# Patient Record
Sex: FEMALE | Race: BLACK OR AFRICAN AMERICAN | NOT HISPANIC OR LATINO
[De-identification: names, ages, dates, MRNs, and addresses within clinical notes are randomized per-mention and may not be internally consistent; named-entity substitution may affect disease eponyms.]

---

## 2017-01-20 ENCOUNTER — APPOINTMENT (OUTPATIENT)
Dept: UROLOGY | Facility: CLINIC | Age: 62
End: 2017-01-20

## 2017-01-23 ENCOUNTER — APPOINTMENT (OUTPATIENT)
Dept: UROLOGY | Facility: CLINIC | Age: 62
End: 2017-01-23

## 2017-01-23 ENCOUNTER — APPOINTMENT (OUTPATIENT)
Dept: ULTRASOUND IMAGING | Facility: IMAGING CENTER | Age: 62
End: 2017-01-23

## 2017-02-15 ENCOUNTER — APPOINTMENT (OUTPATIENT)
Dept: BARIATRICS | Facility: CLINIC | Age: 62
End: 2017-02-15

## 2017-02-15 VITALS
WEIGHT: 293 LBS | HEIGHT: 64 IN | BODY MASS INDEX: 50.02 KG/M2 | SYSTOLIC BLOOD PRESSURE: 130 MMHG | DIASTOLIC BLOOD PRESSURE: 92 MMHG

## 2017-03-06 ENCOUNTER — OUTPATIENT (OUTPATIENT)
Dept: OUTPATIENT SERVICES | Facility: HOSPITAL | Age: 62
LOS: 1 days | End: 2017-03-06
Payer: COMMERCIAL

## 2017-03-06 VITALS
RESPIRATION RATE: 18 BRPM | HEIGHT: 61.5 IN | WEIGHT: 293 LBS | HEART RATE: 73 BPM | SYSTOLIC BLOOD PRESSURE: 167 MMHG | OXYGEN SATURATION: 97 % | DIASTOLIC BLOOD PRESSURE: 90 MMHG | TEMPERATURE: 98 F

## 2017-03-06 DIAGNOSIS — Z98.89 OTHER SPECIFIED POSTPROCEDURAL STATES: Chronic | ICD-10-CM

## 2017-03-06 DIAGNOSIS — Z01.818 ENCOUNTER FOR OTHER PREPROCEDURAL EXAMINATION: ICD-10-CM

## 2017-03-06 DIAGNOSIS — E11.9 TYPE 2 DIABETES MELLITUS WITHOUT COMPLICATIONS: ICD-10-CM

## 2017-03-06 DIAGNOSIS — Z98.84 BARIATRIC SURGERY STATUS: ICD-10-CM

## 2017-03-06 DIAGNOSIS — E66.01 MORBID (SEVERE) OBESITY DUE TO EXCESS CALORIES: ICD-10-CM

## 2017-03-06 DIAGNOSIS — Z87.442 PERSONAL HISTORY OF URINARY CALCULI: Chronic | ICD-10-CM

## 2017-03-06 DIAGNOSIS — Z90.710 ACQUIRED ABSENCE OF BOTH CERVIX AND UTERUS: Chronic | ICD-10-CM

## 2017-03-06 LAB
ALBUMIN SERPL ELPH-MCNC: 4 G/DL — SIGNIFICANT CHANGE UP (ref 3.3–5)
ALP SERPL-CCNC: 100 U/L — SIGNIFICANT CHANGE UP (ref 30–120)
ALT FLD-CCNC: 24 U/L DA — SIGNIFICANT CHANGE UP (ref 10–60)
ANION GAP SERPL CALC-SCNC: 10 MMOL/L — SIGNIFICANT CHANGE UP (ref 5–17)
AST SERPL-CCNC: 23 U/L — SIGNIFICANT CHANGE UP (ref 10–40)
BILIRUB SERPL-MCNC: 0.4 MG/DL — SIGNIFICANT CHANGE UP (ref 0.2–1.2)
BLD GP AB SCN SERPL QL: SIGNIFICANT CHANGE UP
BUN SERPL-MCNC: 14 MG/DL — SIGNIFICANT CHANGE UP (ref 7–23)
CALCIUM SERPL-MCNC: 9.8 MG/DL — SIGNIFICANT CHANGE UP (ref 8.4–10.5)
CHLORIDE SERPL-SCNC: 105 MMOL/L — SIGNIFICANT CHANGE UP (ref 96–108)
CO2 SERPL-SCNC: 28 MMOL/L — SIGNIFICANT CHANGE UP (ref 22–31)
CREAT SERPL-MCNC: 0.9 MG/DL — SIGNIFICANT CHANGE UP (ref 0.5–1.3)
GLUCOSE SERPL-MCNC: 100 MG/DL — HIGH (ref 70–99)
HCT VFR BLD CALC: 41.4 % — SIGNIFICANT CHANGE UP (ref 34.5–45)
HGB BLD-MCNC: 12.8 G/DL — SIGNIFICANT CHANGE UP (ref 11.5–15.5)
MCHC RBC-ENTMCNC: 26.5 PG — LOW (ref 27–34)
MCHC RBC-ENTMCNC: 31 GM/DL — LOW (ref 32–36)
MCV RBC AUTO: 85.6 FL — SIGNIFICANT CHANGE UP (ref 80–100)
PLATELET # BLD AUTO: 334 K/UL — SIGNIFICANT CHANGE UP (ref 150–400)
POTASSIUM SERPL-MCNC: 4.3 MMOL/L — SIGNIFICANT CHANGE UP (ref 3.5–5.3)
POTASSIUM SERPL-SCNC: 4.3 MMOL/L — SIGNIFICANT CHANGE UP (ref 3.5–5.3)
PROT SERPL-MCNC: 8.3 G/DL — SIGNIFICANT CHANGE UP (ref 6–8.3)
RBC # BLD: 4.84 M/UL — SIGNIFICANT CHANGE UP (ref 3.8–5.2)
RBC # FLD: 15.4 % — HIGH (ref 10.3–14.5)
SODIUM SERPL-SCNC: 143 MMOL/L — SIGNIFICANT CHANGE UP (ref 135–145)
WBC # BLD: 9.3 K/UL — SIGNIFICANT CHANGE UP (ref 3.8–10.5)
WBC # FLD AUTO: 9.3 K/UL — SIGNIFICANT CHANGE UP (ref 3.8–10.5)

## 2017-03-06 PROCEDURE — 93010 ELECTROCARDIOGRAM REPORT: CPT

## 2017-03-06 PROCEDURE — 86850 RBC ANTIBODY SCREEN: CPT

## 2017-03-06 PROCEDURE — 83036 HEMOGLOBIN GLYCOSYLATED A1C: CPT

## 2017-03-06 PROCEDURE — 36415 COLL VENOUS BLD VENIPUNCTURE: CPT

## 2017-03-06 PROCEDURE — 85027 COMPLETE CBC AUTOMATED: CPT

## 2017-03-06 PROCEDURE — 86901 BLOOD TYPING SEROLOGIC RH(D): CPT

## 2017-03-06 PROCEDURE — 80053 COMPREHEN METABOLIC PANEL: CPT

## 2017-03-06 PROCEDURE — G0463: CPT

## 2017-03-06 PROCEDURE — 86900 BLOOD TYPING SEROLOGIC ABO: CPT

## 2017-03-06 PROCEDURE — 93005 ELECTROCARDIOGRAM TRACING: CPT

## 2017-03-06 NOTE — H&P PST ADULT - FAMILY HISTORY
Father  Still living? No  Family history of diabetes mellitus, Age at diagnosis: Age Unknown     Mother  Still living? No  Family history of heart disease, Age at diagnosis: Age Unknown

## 2017-03-06 NOTE — H&P PST ADULT - HISTORY OF PRESENT ILLNESS
61 y/o morbidly obese female with h/o gout, HTN, cholecystitis, diverticulitis, renal colic, s/p lap band- 2006, cholecystectomy 2011, s/p Percutaneous stone extraction - 2011, overactive bladder, lap band in past, presents to PST for pre surgical evaluation and scheduled for  laparoscopic removal of lap band and sleeve gastrectomy

## 2017-03-06 NOTE — H&P PST ADULT - PSH
History of Cholecystectomy- 2011/April    History of kidney stones  s/p percutaneous stone extractiion 2011  Lap-Band Surgery - 2006    S/P D&C (status post dilation and curettage)  2010 and 2014  S/P hysterectomy  2014

## 2017-03-06 NOTE — H&P PST ADULT - NSANTHOSAYNRD_GEN_A_CORE
No. WHIT screening performed.  STOP BANG Legend: 0-2 = LOW Risk; 3-4 = INTERMEDIATE Risk; 5-8 = HIGH Risk

## 2017-03-06 NOTE — H&P PST ADULT - PMH
Cholecystitis    Diverticulitis    Ex-Cigarette Smoker    GERD (Gastroesophageal Reflux Disease)    Gout    H/O renal calculi    HTN - Hypertension    Morbid Obesity    Osteoarthritis    Overactive Bladder    Pancreatitis Chronic    Renal Colic    Sleep Apnea- mild  per pt. no treatment  Type 2 diabetes mellitus

## 2017-03-07 LAB — HBA1C BLD-MCNC: 5.9 % — HIGH (ref 4–5.6)

## 2017-03-16 ENCOUNTER — APPOINTMENT (OUTPATIENT)
Dept: BARIATRICS | Facility: CLINIC | Age: 62
End: 2017-03-16

## 2017-03-16 VITALS
BODY MASS INDEX: 50.02 KG/M2 | SYSTOLIC BLOOD PRESSURE: 171 MMHG | HEIGHT: 64 IN | WEIGHT: 293 LBS | DIASTOLIC BLOOD PRESSURE: 94 MMHG

## 2017-03-16 RX ORDER — CEFDINIR 300 MG/1
300 CAPSULE ORAL
Qty: 20 | Refills: 0 | Status: DISCONTINUED | COMMUNITY
Start: 2017-01-10 | End: 2017-03-16

## 2017-03-16 RX ORDER — AZELASTINE HYDROCHLORIDE 137 UG/1
0.1 SPRAY, METERED NASAL
Qty: 30 | Refills: 0 | Status: DISCONTINUED | COMMUNITY
Start: 2017-01-04 | End: 2017-03-16

## 2017-03-22 ENCOUNTER — RESULT REVIEW (OUTPATIENT)
Age: 62
End: 2017-03-22

## 2017-03-23 ENCOUNTER — OUTPATIENT (OUTPATIENT)
Dept: OUTPATIENT SERVICES | Facility: HOSPITAL | Age: 62
LOS: 1 days | End: 2017-03-23
Payer: COMMERCIAL

## 2017-03-23 ENCOUNTER — TRANSCRIPTION ENCOUNTER (OUTPATIENT)
Age: 62
End: 2017-03-23

## 2017-03-23 ENCOUNTER — APPOINTMENT (OUTPATIENT)
Dept: BARIATRICS | Facility: HOSPITAL | Age: 62
End: 2017-03-23

## 2017-03-23 DIAGNOSIS — Z98.89 OTHER SPECIFIED POSTPROCEDURAL STATES: Chronic | ICD-10-CM

## 2017-03-23 DIAGNOSIS — Z90.710 ACQUIRED ABSENCE OF BOTH CERVIX AND UTERUS: Chronic | ICD-10-CM

## 2017-03-23 DIAGNOSIS — Z87.442 PERSONAL HISTORY OF URINARY CALCULI: Chronic | ICD-10-CM

## 2017-03-23 PROCEDURE — 88300 SURGICAL PATH GROSS: CPT | Mod: 26,59

## 2017-03-23 PROCEDURE — 88302 TISSUE EXAM BY PATHOLOGIST: CPT | Mod: 26

## 2017-03-23 PROCEDURE — 43774 LAP RMVL GASTR ADJ ALL PARTS: CPT | Mod: AS

## 2017-03-27 DIAGNOSIS — E66.01 MORBID (SEVERE) OBESITY DUE TO EXCESS CALORIES: ICD-10-CM

## 2017-03-27 DIAGNOSIS — E11.9 TYPE 2 DIABETES MELLITUS WITHOUT COMPLICATIONS: ICD-10-CM

## 2017-03-29 ENCOUNTER — CHART COPY (OUTPATIENT)
Age: 62
End: 2017-03-29

## 2017-03-29 PROCEDURE — 88300 SURGICAL PATH GROSS: CPT

## 2017-03-29 PROCEDURE — 43235 EGD DIAGNOSTIC BRUSH WASH: CPT | Mod: XP

## 2017-03-29 PROCEDURE — 88302 TISSUE EXAM BY PATHOLOGIST: CPT

## 2017-03-29 PROCEDURE — 43774 LAP RMVL GASTR ADJ ALL PARTS: CPT

## 2017-03-30 ENCOUNTER — APPOINTMENT (OUTPATIENT)
Dept: BARIATRICS | Facility: CLINIC | Age: 62
End: 2017-03-30

## 2017-03-30 VITALS
HEIGHT: 64 IN | DIASTOLIC BLOOD PRESSURE: 68 MMHG | BODY MASS INDEX: 50.02 KG/M2 | SYSTOLIC BLOOD PRESSURE: 122 MMHG | WEIGHT: 293 LBS

## 2017-03-30 DIAGNOSIS — Z98.84 BARIATRIC SURGERY STATUS: ICD-10-CM

## 2017-04-26 ENCOUNTER — APPOINTMENT (OUTPATIENT)
Dept: BARIATRICS | Facility: CLINIC | Age: 62
End: 2017-04-26

## 2017-04-26 VITALS
BODY MASS INDEX: 50.02 KG/M2 | WEIGHT: 293 LBS | SYSTOLIC BLOOD PRESSURE: 128 MMHG | HEIGHT: 64 IN | DIASTOLIC BLOOD PRESSURE: 86 MMHG

## 2017-04-26 RX ORDER — PREDNISONE 20 MG/1
20 TABLET ORAL
Qty: 10 | Refills: 0 | Status: COMPLETED | COMMUNITY
Start: 2017-04-06

## 2017-04-26 RX ORDER — INDOMETHACIN 25 MG/1
25 CAPSULE ORAL
Qty: 30 | Refills: 0 | Status: COMPLETED | COMMUNITY
Start: 2017-04-10

## 2017-07-12 ENCOUNTER — APPOINTMENT (OUTPATIENT)
Dept: BARIATRICS | Facility: CLINIC | Age: 62
End: 2017-07-12

## 2017-07-12 VITALS
SYSTOLIC BLOOD PRESSURE: 154 MMHG | HEIGHT: 64 IN | DIASTOLIC BLOOD PRESSURE: 97 MMHG | BODY MASS INDEX: 50.02 KG/M2 | WEIGHT: 293 LBS | HEART RATE: 69 BPM

## 2017-07-12 RX ORDER — RAMIPRIL 2.5 MG/1
2.5 CAPSULE ORAL
Qty: 90 | Refills: 0 | Status: COMPLETED | COMMUNITY
Start: 2017-02-14 | End: 2017-07-12

## 2017-07-13 ENCOUNTER — APPOINTMENT (OUTPATIENT)
Dept: UROLOGY | Facility: CLINIC | Age: 62
End: 2017-07-13

## 2017-07-13 RX ORDER — IBUPROFEN 800 MG/1
800 TABLET, FILM COATED ORAL
Qty: 30 | Refills: 0 | Status: DISCONTINUED | COMMUNITY
Start: 2016-10-25 | End: 2017-07-13

## 2017-07-13 RX ORDER — OMEGA-3/DHA/EPA/FISH OIL 910-1400MG
CAPSULE ORAL
Refills: 0 | Status: ACTIVE | COMMUNITY

## 2017-07-13 RX ORDER — BENZONATATE 200 MG/1
200 CAPSULE ORAL
Qty: 20 | Refills: 0 | Status: COMPLETED | COMMUNITY
Start: 2017-06-20 | End: 2017-07-13

## 2017-07-13 RX ORDER — CLARITHROMYCIN 500 MG/1
500 TABLET, FILM COATED ORAL
Qty: 20 | Refills: 0 | Status: COMPLETED | COMMUNITY
Start: 2017-06-13 | End: 2017-07-13

## 2017-07-13 RX ORDER — AMOXICILLIN 875 MG/1
875 TABLET, FILM COATED ORAL
Qty: 20 | Refills: 0 | Status: COMPLETED | COMMUNITY
Start: 2017-03-13 | End: 2017-07-13

## 2017-07-13 RX ORDER — PROMETHAZINE HYDROCHLORIDE AND DEXTROMETHORPHAN HYDROBROMIDE ORAL SOLUTION 15; 6.25 MG/5ML; MG/5ML
6.25-15 SOLUTION ORAL
Qty: 200 | Refills: 0 | Status: COMPLETED | COMMUNITY
Start: 2017-01-10 | End: 2017-07-13

## 2017-07-13 RX ORDER — BACILLUS COAGULANS/INULIN 1B-250 MG
CAPSULE ORAL
Refills: 0 | Status: ACTIVE | COMMUNITY

## 2017-07-13 RX ORDER — RAMIPRIL 2.5 MG/1
2.5 CAPSULE ORAL
Refills: 0 | Status: DISCONTINUED | COMMUNITY
End: 2017-07-13

## 2017-07-13 RX ORDER — FLUTICASONE PROPIONATE 50 UG/1
50 SPRAY, METERED NASAL
Qty: 16 | Refills: 0 | Status: COMPLETED | COMMUNITY
Start: 2017-03-13 | End: 2017-07-13

## 2017-07-28 ENCOUNTER — APPOINTMENT (OUTPATIENT)
Dept: UROLOGY | Facility: CLINIC | Age: 62
End: 2017-07-28
Payer: MEDICARE

## 2017-07-28 ENCOUNTER — OUTPATIENT (OUTPATIENT)
Dept: OUTPATIENT SERVICES | Facility: HOSPITAL | Age: 62
LOS: 1 days | End: 2017-07-28
Payer: COMMERCIAL

## 2017-07-28 DIAGNOSIS — Z98.89 OTHER SPECIFIED POSTPROCEDURAL STATES: Chronic | ICD-10-CM

## 2017-07-28 DIAGNOSIS — Z90.710 ACQUIRED ABSENCE OF BOTH CERVIX AND UTERUS: Chronic | ICD-10-CM

## 2017-07-28 DIAGNOSIS — R35.0 FREQUENCY OF MICTURITION: ICD-10-CM

## 2017-07-28 DIAGNOSIS — Z87.442 PERSONAL HISTORY OF URINARY CALCULI: Chronic | ICD-10-CM

## 2017-07-28 PROCEDURE — 76775 US EXAM ABDO BACK WALL LIM: CPT | Mod: 26

## 2017-07-28 PROCEDURE — 76775 US EXAM ABDO BACK WALL LIM: CPT

## 2017-07-28 PROCEDURE — 99213 OFFICE O/P EST LOW 20 MIN: CPT | Mod: 25

## 2017-08-04 DIAGNOSIS — N20.0 CALCULUS OF KIDNEY: ICD-10-CM

## 2017-09-06 ENCOUNTER — APPOINTMENT (OUTPATIENT)
Dept: BARIATRICS | Facility: CLINIC | Age: 62
End: 2017-09-06
Payer: MEDICARE

## 2017-09-06 VITALS
BODY MASS INDEX: 49.17 KG/M2 | WEIGHT: 288 LBS | SYSTOLIC BLOOD PRESSURE: 139 MMHG | HEART RATE: 69 BPM | DIASTOLIC BLOOD PRESSURE: 87 MMHG | HEIGHT: 64 IN

## 2017-09-06 PROCEDURE — 99214 OFFICE O/P EST MOD 30 MIN: CPT

## 2017-09-13 ENCOUNTER — OUTPATIENT (OUTPATIENT)
Dept: OUTPATIENT SERVICES | Facility: HOSPITAL | Age: 62
LOS: 1 days | End: 2017-09-13
Payer: COMMERCIAL

## 2017-09-13 VITALS
WEIGHT: 285.06 LBS | TEMPERATURE: 99 F | DIASTOLIC BLOOD PRESSURE: 84 MMHG | HEART RATE: 66 BPM | OXYGEN SATURATION: 97 % | SYSTOLIC BLOOD PRESSURE: 127 MMHG | RESPIRATION RATE: 16 BRPM | HEIGHT: 62 IN

## 2017-09-13 DIAGNOSIS — Z01.818 ENCOUNTER FOR OTHER PREPROCEDURAL EXAMINATION: ICD-10-CM

## 2017-09-13 DIAGNOSIS — N32.81 OVERACTIVE BLADDER: ICD-10-CM

## 2017-09-13 DIAGNOSIS — Z87.442 PERSONAL HISTORY OF URINARY CALCULI: Chronic | ICD-10-CM

## 2017-09-13 DIAGNOSIS — E11.9 TYPE 2 DIABETES MELLITUS WITHOUT COMPLICATIONS: ICD-10-CM

## 2017-09-13 DIAGNOSIS — I10 ESSENTIAL (PRIMARY) HYPERTENSION: ICD-10-CM

## 2017-09-13 DIAGNOSIS — Z98.89 OTHER SPECIFIED POSTPROCEDURAL STATES: Chronic | ICD-10-CM

## 2017-09-13 DIAGNOSIS — N32.81 OVERACTIVE BLADDER: Chronic | ICD-10-CM

## 2017-09-13 DIAGNOSIS — Z90.710 ACQUIRED ABSENCE OF BOTH CERVIX AND UTERUS: Chronic | ICD-10-CM

## 2017-09-13 LAB
ANION GAP SERPL CALC-SCNC: 15 MMOL/L — SIGNIFICANT CHANGE UP (ref 5–17)
BUN SERPL-MCNC: 16 MG/DL — SIGNIFICANT CHANGE UP (ref 7–23)
CALCIUM SERPL-MCNC: 10 MG/DL — SIGNIFICANT CHANGE UP (ref 8.4–10.5)
CHLORIDE SERPL-SCNC: 105 MMOL/L — SIGNIFICANT CHANGE UP (ref 96–108)
CO2 SERPL-SCNC: 21 MMOL/L — LOW (ref 22–31)
CREAT SERPL-MCNC: 0.89 MG/DL — SIGNIFICANT CHANGE UP (ref 0.5–1.3)
GLUCOSE SERPL-MCNC: 89 MG/DL — SIGNIFICANT CHANGE UP (ref 70–99)
HBA1C BLD-MCNC: 5.8 % — HIGH (ref 4–5.6)
HCT VFR BLD CALC: 41.1 % — SIGNIFICANT CHANGE UP (ref 34.5–45)
HGB BLD-MCNC: 13.2 G/DL — SIGNIFICANT CHANGE UP (ref 11.5–15.5)
MCHC RBC-ENTMCNC: 28.1 PG — SIGNIFICANT CHANGE UP (ref 27–34)
MCHC RBC-ENTMCNC: 32.1 GM/DL — SIGNIFICANT CHANGE UP (ref 32–36)
MCV RBC AUTO: 87.6 FL — SIGNIFICANT CHANGE UP (ref 80–100)
PLATELET # BLD AUTO: 298 K/UL — SIGNIFICANT CHANGE UP (ref 150–400)
POTASSIUM SERPL-MCNC: 4.3 MMOL/L — SIGNIFICANT CHANGE UP (ref 3.5–5.3)
POTASSIUM SERPL-SCNC: 4.3 MMOL/L — SIGNIFICANT CHANGE UP (ref 3.5–5.3)
RBC # BLD: 4.69 M/UL — SIGNIFICANT CHANGE UP (ref 3.8–5.2)
RBC # FLD: 15.5 % — HIGH (ref 10.3–14.5)
SODIUM SERPL-SCNC: 141 MMOL/L — SIGNIFICANT CHANGE UP (ref 135–145)
WBC # BLD: 8.45 K/UL — SIGNIFICANT CHANGE UP (ref 3.8–10.5)
WBC # FLD AUTO: 8.45 K/UL — SIGNIFICANT CHANGE UP (ref 3.8–10.5)

## 2017-09-13 PROCEDURE — G0463: CPT

## 2017-09-13 PROCEDURE — 83036 HEMOGLOBIN GLYCOSYLATED A1C: CPT

## 2017-09-13 PROCEDURE — 85027 COMPLETE CBC AUTOMATED: CPT

## 2017-09-13 PROCEDURE — 87086 URINE CULTURE/COLONY COUNT: CPT

## 2017-09-13 PROCEDURE — 80048 BASIC METABOLIC PNL TOTAL CA: CPT

## 2017-09-13 RX ORDER — RAMIPRIL 5 MG
0 CAPSULE ORAL
Qty: 0 | Refills: 0 | COMMUNITY

## 2017-09-13 RX ORDER — OMEGA-3 ACID ETHYL ESTERS 1 G
0 CAPSULE ORAL
Qty: 0 | Refills: 0 | COMMUNITY

## 2017-09-13 NOTE — H&P PST ADULT - ANESTHESIA, PREVIOUS REACTION, PROFILE
rash patient doesn't know what type of anesthetic drug,h/o nausea in 2011 and last surgery no problem

## 2017-09-13 NOTE — H&P PST ADULT - PMH
Cholecystitis    Diverticulitis    Ex-Cigarette Smoker    GERD (Gastroesophageal Reflux Disease)    Gout    H/O renal calculi    HTN - Hypertension    Morbid Obesity    Osteoarthritis    Overactive Bladder    Pancreatitis Chronic    Renal Colic    Sleep Apnea- mild  per pt. no treatment Diverticulitis    Essential hypertension    Ex-Cigarette Smoker    GERD (Gastroesophageal Reflux Disease)    Gout    Irritable bowel syndrome, unspecified type    Morbid Obesity  BMI 52  Osteoarthritis    Overactive Bladder    Pancreatitis Chronic    Sleep Apnea- mild  per pt. no treatment  Type 2 diabetes mellitus

## 2017-09-13 NOTE — H&P PST ADULT - HISTORY OF PRESENT ILLNESS
63 y/o morbidly obese female with h/o gout, HTN, cholecystitis, diverticulitis, renal colic,DM2 s/p lap band- 2006& removal in 03/2017, cholecystectomy 2011, s/p Percutaneous stone extraction - 2011, overactive bladder s/p Interstim insertion in 04/2016. Pt c/o stabbing pain to left buttock x 6 months. Had f/ou urology consult- s/p X-ray- revealed lead migration- scheduled for Interstim revision on 09/20/2017

## 2017-09-13 NOTE — H&P PST ADULT - PSH
History of Cholecystectomy- 2011/April    History of kidney stones  s/p percutaneous stone extraction 2011  Lap-Band Surgery - 2006  Removal of Lap band- 03/2017  OAB (overactive bladder)  s/p Interstim insertion-04/2016  S/P D&C (status post dilation and curettage)  2010 and 2014  S/P hysterectomy  2014

## 2017-09-14 LAB
CULTURE RESULTS: SIGNIFICANT CHANGE UP
SPECIMEN SOURCE: SIGNIFICANT CHANGE UP

## 2017-09-27 ENCOUNTER — INPATIENT (INPATIENT)
Facility: HOSPITAL | Age: 62
LOS: 4 days | Discharge: HOME HEALTH | DRG: 392 | End: 2017-10-02
Attending: HOSPITALIST | Admitting: HOSPITALIST
Payer: COMMERCIAL

## 2017-09-27 VITALS
HEART RATE: 81 BPM | OXYGEN SATURATION: 100 % | SYSTOLIC BLOOD PRESSURE: 134 MMHG | TEMPERATURE: 98 F | HEIGHT: 70 IN | WEIGHT: 289.91 LBS | RESPIRATION RATE: 20 BRPM | DIASTOLIC BLOOD PRESSURE: 86 MMHG

## 2017-09-27 DIAGNOSIS — Z87.442 PERSONAL HISTORY OF URINARY CALCULI: Chronic | ICD-10-CM

## 2017-09-27 DIAGNOSIS — Z98.89 OTHER SPECIFIED POSTPROCEDURAL STATES: Chronic | ICD-10-CM

## 2017-09-27 DIAGNOSIS — K57.80 DIVERTICULITIS OF INTESTINE, PART UNSPECIFIED, WITH PERFORATION AND ABSCESS WITHOUT BLEEDING: ICD-10-CM

## 2017-09-27 DIAGNOSIS — Z90.710 ACQUIRED ABSENCE OF BOTH CERVIX AND UTERUS: Chronic | ICD-10-CM

## 2017-09-27 DIAGNOSIS — N32.81 OVERACTIVE BLADDER: Chronic | ICD-10-CM

## 2017-09-27 LAB
ALBUMIN SERPL ELPH-MCNC: 3.8 G/DL — SIGNIFICANT CHANGE UP (ref 3.3–5)
ALP SERPL-CCNC: 90 U/L — SIGNIFICANT CHANGE UP (ref 30–120)
ALT FLD-CCNC: 27 U/L DA — SIGNIFICANT CHANGE UP (ref 10–60)
AMYLASE P1 CFR SERPL: 76 U/L — SIGNIFICANT CHANGE UP (ref 25–125)
ANION GAP SERPL CALC-SCNC: 8 MMOL/L — SIGNIFICANT CHANGE UP (ref 5–17)
APPEARANCE UR: CLEAR — SIGNIFICANT CHANGE UP
AST SERPL-CCNC: 33 U/L — SIGNIFICANT CHANGE UP (ref 10–40)
BILIRUB SERPL-MCNC: 0.6 MG/DL — SIGNIFICANT CHANGE UP (ref 0.2–1.2)
BILIRUB UR-MCNC: NEGATIVE — SIGNIFICANT CHANGE UP
BUN SERPL-MCNC: 7 MG/DL — SIGNIFICANT CHANGE UP (ref 7–23)
CALCIUM SERPL-MCNC: 9.7 MG/DL — SIGNIFICANT CHANGE UP (ref 8.4–10.5)
CHLORIDE SERPL-SCNC: 104 MMOL/L — SIGNIFICANT CHANGE UP (ref 96–108)
CO2 SERPL-SCNC: 26 MMOL/L — SIGNIFICANT CHANGE UP (ref 22–31)
COLOR SPEC: YELLOW — SIGNIFICANT CHANGE UP
CREAT SERPL-MCNC: 0.83 MG/DL — SIGNIFICANT CHANGE UP (ref 0.5–1.3)
DIFF PNL FLD: NEGATIVE — SIGNIFICANT CHANGE UP
EOSINOPHIL NFR BLD AUTO: 2 % — SIGNIFICANT CHANGE UP (ref 0–6)
GLUCOSE SERPL-MCNC: 96 MG/DL — SIGNIFICANT CHANGE UP (ref 70–99)
GLUCOSE UR QL: NEGATIVE MG/DL — SIGNIFICANT CHANGE UP
HCT VFR BLD CALC: 47.1 % — HIGH (ref 34.5–45)
HGB BLD-MCNC: 14.8 G/DL — SIGNIFICANT CHANGE UP (ref 11.5–15.5)
KETONES UR-MCNC: NEGATIVE — SIGNIFICANT CHANGE UP
LEUKOCYTE ESTERASE UR-ACNC: NEGATIVE — SIGNIFICANT CHANGE UP
LIDOCAIN IGE QN: 121 U/L — SIGNIFICANT CHANGE UP (ref 73–393)
LYMPHOCYTES # BLD AUTO: 15 % — SIGNIFICANT CHANGE UP (ref 13–44)
MCHC RBC-ENTMCNC: 28.1 PG — SIGNIFICANT CHANGE UP (ref 27–34)
MCHC RBC-ENTMCNC: 31.4 GM/DL — LOW (ref 32–36)
MCV RBC AUTO: 89.7 FL — SIGNIFICANT CHANGE UP (ref 80–100)
MONOCYTES NFR BLD AUTO: 3 % — SIGNIFICANT CHANGE UP (ref 2–14)
NEUTROPHILS NFR BLD AUTO: 80 % — HIGH (ref 43–77)
NITRITE UR-MCNC: NEGATIVE — SIGNIFICANT CHANGE UP
PH UR: 6 — SIGNIFICANT CHANGE UP (ref 5–8)
PLATELET # BLD AUTO: 205 K/UL — SIGNIFICANT CHANGE UP (ref 150–400)
POTASSIUM SERPL-MCNC: 4.5 MMOL/L — SIGNIFICANT CHANGE UP (ref 3.5–5.3)
POTASSIUM SERPL-SCNC: 4.5 MMOL/L — SIGNIFICANT CHANGE UP (ref 3.5–5.3)
PROT SERPL-MCNC: 8.7 G/DL — HIGH (ref 6–8.3)
PROT UR-MCNC: NEGATIVE MG/DL — SIGNIFICANT CHANGE UP
RBC # BLD: 5.25 M/UL — HIGH (ref 3.8–5.2)
RBC # FLD: 14.8 % — HIGH (ref 10.3–14.5)
SODIUM SERPL-SCNC: 138 MMOL/L — SIGNIFICANT CHANGE UP (ref 135–145)
SP GR SPEC: 1.01 — SIGNIFICANT CHANGE UP (ref 1.01–1.02)
UROBILINOGEN FLD QL: NEGATIVE MG/DL — SIGNIFICANT CHANGE UP
WBC # BLD: 5 K/UL — SIGNIFICANT CHANGE UP (ref 3.8–10.5)
WBC # FLD AUTO: 5 K/UL — SIGNIFICANT CHANGE UP (ref 3.8–10.5)

## 2017-09-27 PROCEDURE — 99223 1ST HOSP IP/OBS HIGH 75: CPT | Mod: AI

## 2017-09-27 PROCEDURE — 74177 CT ABD & PELVIS W/CONTRAST: CPT | Mod: 26

## 2017-09-27 PROCEDURE — 99285 EMERGENCY DEPT VISIT HI MDM: CPT

## 2017-09-27 RX ORDER — SODIUM CHLORIDE 9 MG/ML
1000 INJECTION, SOLUTION INTRAVENOUS
Qty: 0 | Refills: 0 | Status: DISCONTINUED | OUTPATIENT
Start: 2017-09-27 | End: 2017-10-02

## 2017-09-27 RX ORDER — SODIUM CHLORIDE 9 MG/ML
1000 INJECTION INTRAMUSCULAR; INTRAVENOUS; SUBCUTANEOUS ONCE
Qty: 0 | Refills: 0 | Status: COMPLETED | OUTPATIENT
Start: 2017-09-27 | End: 2017-09-27

## 2017-09-27 RX ORDER — MORPHINE SULFATE 50 MG/1
2 CAPSULE, EXTENDED RELEASE ORAL ONCE
Qty: 0 | Refills: 0 | Status: DISCONTINUED | OUTPATIENT
Start: 2017-09-27 | End: 2017-09-27

## 2017-09-27 RX ORDER — ALLOPURINOL 300 MG
300 TABLET ORAL DAILY
Qty: 0 | Refills: 0 | Status: DISCONTINUED | OUTPATIENT
Start: 2017-09-27 | End: 2017-10-02

## 2017-09-27 RX ORDER — DEXTROSE 50 % IN WATER 50 %
1 SYRINGE (ML) INTRAVENOUS ONCE
Qty: 0 | Refills: 0 | Status: DISCONTINUED | OUTPATIENT
Start: 2017-09-27 | End: 2017-10-02

## 2017-09-27 RX ORDER — METRONIDAZOLE 500 MG
500 TABLET ORAL ONCE
Qty: 0 | Refills: 0 | Status: COMPLETED | OUTPATIENT
Start: 2017-09-27 | End: 2017-09-27

## 2017-09-27 RX ORDER — CIPROFLOXACIN LACTATE 400MG/40ML
400 VIAL (ML) INTRAVENOUS EVERY 12 HOURS
Qty: 0 | Refills: 0 | Status: DISCONTINUED | OUTPATIENT
Start: 2017-09-27 | End: 2017-10-01

## 2017-09-27 RX ORDER — SODIUM CHLORIDE 9 MG/ML
1000 INJECTION, SOLUTION INTRAVENOUS
Qty: 0 | Refills: 0 | Status: COMPLETED | OUTPATIENT
Start: 2017-09-27 | End: 2017-09-27

## 2017-09-27 RX ORDER — INSULIN LISPRO 100/ML
VIAL (ML) SUBCUTANEOUS
Qty: 0 | Refills: 0 | Status: DISCONTINUED | OUTPATIENT
Start: 2017-09-27 | End: 2017-09-29

## 2017-09-27 RX ORDER — SODIUM CHLORIDE 9 MG/ML
1000 INJECTION INTRAMUSCULAR; INTRAVENOUS; SUBCUTANEOUS
Qty: 0 | Refills: 0 | Status: DISCONTINUED | OUTPATIENT
Start: 2017-09-27 | End: 2017-09-28

## 2017-09-27 RX ORDER — COLCHICINE 0.6 MG
0.6 TABLET ORAL DAILY
Qty: 0 | Refills: 0 | Status: DISCONTINUED | OUTPATIENT
Start: 2017-09-27 | End: 2017-09-29

## 2017-09-27 RX ORDER — DEXTROSE 50 % IN WATER 50 %
25 SYRINGE (ML) INTRAVENOUS ONCE
Qty: 0 | Refills: 0 | Status: DISCONTINUED | OUTPATIENT
Start: 2017-09-27 | End: 2017-10-02

## 2017-09-27 RX ORDER — PANTOPRAZOLE SODIUM 20 MG/1
40 TABLET, DELAYED RELEASE ORAL
Qty: 0 | Refills: 0 | Status: DISCONTINUED | OUTPATIENT
Start: 2017-09-27 | End: 2017-10-02

## 2017-09-27 RX ORDER — ALBUTEROL 90 UG/1
2 AEROSOL, METERED ORAL EVERY 6 HOURS
Qty: 0 | Refills: 0 | Status: DISCONTINUED | OUTPATIENT
Start: 2017-09-27 | End: 2017-10-02

## 2017-09-27 RX ORDER — INFLUENZA VIRUS VACCINE 15; 15; 15; 15 UG/.5ML; UG/.5ML; UG/.5ML; UG/.5ML
0.5 SUSPENSION INTRAMUSCULAR ONCE
Qty: 0 | Refills: 0 | Status: COMPLETED | OUTPATIENT
Start: 2017-09-27 | End: 2017-09-27

## 2017-09-27 RX ORDER — METRONIDAZOLE 500 MG
500 TABLET ORAL EVERY 8 HOURS
Qty: 0 | Refills: 0 | Status: DISCONTINUED | OUTPATIENT
Start: 2017-09-27 | End: 2017-10-01

## 2017-09-27 RX ORDER — ATENOLOL 25 MG/1
100 TABLET ORAL DAILY
Qty: 0 | Refills: 0 | Status: DISCONTINUED | OUTPATIENT
Start: 2017-09-27 | End: 2017-10-02

## 2017-09-27 RX ORDER — CIPROFLOXACIN LACTATE 400MG/40ML
400 VIAL (ML) INTRAVENOUS ONCE
Qty: 0 | Refills: 0 | Status: COMPLETED | OUTPATIENT
Start: 2017-09-27 | End: 2017-09-27

## 2017-09-27 RX ORDER — GLUCAGON INJECTION, SOLUTION 0.5 MG/.1ML
1 INJECTION, SOLUTION SUBCUTANEOUS ONCE
Qty: 0 | Refills: 0 | Status: DISCONTINUED | OUTPATIENT
Start: 2017-09-27 | End: 2017-10-02

## 2017-09-27 RX ORDER — DEXTROSE 50 % IN WATER 50 %
12.5 SYRINGE (ML) INTRAVENOUS ONCE
Qty: 0 | Refills: 0 | Status: DISCONTINUED | OUTPATIENT
Start: 2017-09-27 | End: 2017-10-02

## 2017-09-27 RX ORDER — IOHEXOL 300 MG/ML
30 INJECTION, SOLUTION INTRAVENOUS ONCE
Qty: 0 | Refills: 0 | Status: COMPLETED | OUTPATIENT
Start: 2017-09-27 | End: 2017-09-27

## 2017-09-27 RX ORDER — GABAPENTIN 400 MG/1
300 CAPSULE ORAL
Qty: 0 | Refills: 0 | Status: DISCONTINUED | OUTPATIENT
Start: 2017-09-27 | End: 2017-10-02

## 2017-09-27 RX ORDER — AMLODIPINE BESYLATE 2.5 MG/1
5 TABLET ORAL DAILY
Qty: 0 | Refills: 0 | Status: DISCONTINUED | OUTPATIENT
Start: 2017-09-27 | End: 2017-10-02

## 2017-09-27 RX ORDER — ONDANSETRON 8 MG/1
4 TABLET, FILM COATED ORAL ONCE
Qty: 0 | Refills: 0 | Status: COMPLETED | OUTPATIENT
Start: 2017-09-27 | End: 2017-09-27

## 2017-09-27 RX ADMIN — Medication 100 MILLIGRAM(S): at 16:45

## 2017-09-27 RX ADMIN — Medication 200 MILLIGRAM(S): at 16:45

## 2017-09-27 RX ADMIN — SODIUM CHLORIDE 1000 MILLILITER(S): 9 INJECTION INTRAMUSCULAR; INTRAVENOUS; SUBCUTANEOUS at 16:44

## 2017-09-27 RX ADMIN — ONDANSETRON 4 MILLIGRAM(S): 8 TABLET, FILM COATED ORAL at 13:18

## 2017-09-27 RX ADMIN — MORPHINE SULFATE 2 MILLIGRAM(S): 50 CAPSULE, EXTENDED RELEASE ORAL at 21:07

## 2017-09-27 RX ADMIN — SODIUM CHLORIDE 50 MILLILITER(S): 9 INJECTION, SOLUTION INTRAVENOUS at 23:14

## 2017-09-27 RX ADMIN — MORPHINE SULFATE 2 MILLIGRAM(S): 50 CAPSULE, EXTENDED RELEASE ORAL at 21:41

## 2017-09-27 RX ADMIN — Medication 100 MILLIGRAM(S): at 22:14

## 2017-09-27 RX ADMIN — IOHEXOL 30 MILLILITER(S): 300 INJECTION, SOLUTION INTRAVENOUS at 13:16

## 2017-09-27 NOTE — ED ADULT NURSE NOTE - OBJECTIVE STATEMENT
62 year old female presents to ED with chief complaint of frequent episodes of diarrhea since Friday.  Pt states that whatever she eats or drinks it goes right through her and results in diarrhea.   Pt denies pain or discomfort.  She believes she is dehydrated . Pt appears in no acute distress . skin warm and dry  vital signs within normal limit

## 2017-09-27 NOTE — H&P ADULT - HISTORY OF PRESENT ILLNESS
62f morbid obesity, gout, GERD, diabetes with neuropathy, HTN p/w diarrhea for 5 days found to have sigmoid diverticulitis on CT abdomen done in ER.  Given cipro/flagyl/IVF in ER.  Does not feel well to go home and take oral antibiotics.  Some diffuse abdominal soreness on left side, no pain.  Unable to keep food in her GI system for long; 'goes right through'.  feels weak.  no fever or chills.  no prior h/o constipation or diverticulitis.  h/o gastric lap band in 2007.

## 2017-09-27 NOTE — H&P ADULT - ASSESSMENT
62 female with    1. sigmoid diverticulitis/diarrhea: cipro/flagyl/IVF. quantify diarrhea. Notified patient's GI Dr. Laws about admission. No recent antibiotic use reported.  full liquid diet.    2. type 2 diabetes with neuropathy: SSI coverage.    3. HTN: Blood pressure meds with hold parameters     4. morbid obesity/gastric lap band history: no h/o WHIT.    5. GERD: ppi    6. gout: home meds    7. lovenox for dvt ppx    8. dispo: home in 2-3 days.  plan of care d/w daughter at bedside.

## 2017-09-27 NOTE — ED PROVIDER NOTE - PROGRESS NOTE DETAILS
Occupational Therapy Daily Treatment      Visit Count: 13   Initial Evaluation Date: 3/27/2017  Referred by: MARYA ShanksC  Medical Diagnosis (from order): Z09 Follow-up examination following surgery   MD Follow Up:  5/23/17  Primary Insurance: [employer]  Secondary Insurance: N/A  Requirements:      [employer]/GROUP MEDICARE ADV [id number]  ID#: [id number]  Eff Date: 1/1/17 (calendar year policy)      Visit limit: None, based on medical necessity  Pre auth/cert required: Yes  - When its required: After 30th visit  - How to obtain: Auth Specialist to submit auth request online.      Copay: $25  Pays at 100%  OOP: $2400 - $316.37 met  Secondary Insurance? NONE     Date of Surgery: 2/18/17 and 3/14/17; surgery performed: ORIF L olecranon fracture, 30 degree tricep advancement; rehabilitation guidelines: yes  Diagnosis Precautions: PROM only, splint on at all times other than motion  Relevant co-morbidities and medications: none  Relevant Tests: Relevant diagnostic tests: plain film radiograph      Medical/surgical history, medications and relevant tests have been reviewed.    SUBJECTIVE   Pt states MD appointment went well, will be referred for EMG test.    Current Pain: 1/10  Continued tingling in hand in ulnar distribution, scar at elbow is sensitive; occasional sharp pain through the RF/SF into the volar wrist  Functional Change: none noted by pt    OBJECTIVE   Posture/Observation:  -pt has inability to move the left arm in normal patterns at this time  -has numbness and tingling in the left RF and SF and into the palm distal to wrist  -increased edema in the hand and FA  -shiny appearance to the skin and palm is sweaty  - rash-like appearance to volar hand  - warmer temperature of affected hand vs. Non-affected  -able to make an active loose fist non-painful; \"pulling a lot\" with passive motion  -no noted nail growth or hair growth changes     Range of  Motion (degrees): Digit Active Range of Motion  [] All motions within functional/normal limits except those noted.   [x] Only those motions that were assessed are noted.   [] Uninvolved motion within functional/normal limits.   Left Right Involved Left    Date Initial Initial  5/22/17    Index Finger       MCP  Ext/flex 0/66 0/92  0/76   PIP  Ext/Flex 0/58 0/94  0/90   DIP  Ext/Flex 0/32 0/70  0/64   DPC distance        Middle Finger       MCP  Ext/Flex 0/50 0/90  0/72   PIP Ext/Flex 0/52 0/100  0/88   DIP  Ext/Flex 0/22 0/70  0/54   DPC distance        Ring Finger       MCP  Ext/Flex 0/20 0/90  0/80   PIP  Ext/Flex 0/54 0/92  0/90   DIP  Ext/Flex 0/36 0/74  0/62   DPC distance        Small Finger       MCP  Ext/Flex 0/14 0/90  0/76   PIP  Ext/Flex 0/70 0/92  0/90   DIP Ext/Flex 0/50 0/72  0/74   DPC distance        Thumb       MCP Ext/Flex 0/56 0/64  0/56   IP Ext/Flex 0/42 0/88  0/58   Radial ABDuction 0/45 0/55  0/55   Palmar ABDuction 0/49 0/55  0/57   Tip Opposition full full  full   Opposition to   Base of 5th digit 4cm from full full  full   [standard testing positions unless otherwise noted; Ext=extension, Flex=flexion, SF=small finger, MCP=metacarpophalangeal joint, PIP=proximal interphalangeal joint, DIP=distal interphalangeal joints, DPC=distal palmar crease, IP=interphalangeal joint]  Comments: *denotes pain    Range of Motion (degrees): Active Range of Motion  [] All motions within functional/normal limits except those noted.   [x] Only those motions that were assessed are noted.   [] Uninvolved motion within functional/normal limits.   Norm Left Right Involved   Date  Initial Initial    Wrist Flexion 80°  0/20 0/65 0/60   Wrist Extension 70° 0/40 0/70 0/57   Radial Deviation 20° 0/20 0/20 0/20   Ulnar Deviation 45° 0/25 0/40 0/20   Forearm Supination 90° 0/40 0/90 0/70   Forearm Pronation 90° 0/90 0/90 0/90   [standard testing positions unless otherwise noted]  Comments: denotes pain     Elbow  Circumference: (cm)    Left  3/27/17 Right  3/27/17 Left  4/27/17 Left  5/22/17   Joint line  26.5 23.9 24.4 23.2   Comments:     Wrist/Hand Circumferential: (cm)   Left Right Left 5/22/17   Date: 4/27/17      Proximal palmar crease 19 18.5 18.4   Metacarpophalangeal Row 19.5 18.4 19.2   Wrist at Styloids 17 14.8 16.2     Outcome Measures:   Disabilities of the Arm, Shoulder and Hand (DASH): 90 (scored 0-100; a higher score indicates greater disability)     Range of Motion (degrees) Norm  Norm Left  Left Right   Elbow         Date  Initial Initial   Flexion 140-150  80°  72 WNL   Extension 0-10  70°  20 WNL   standard testing positions unless otherwise noted; Key: ranges are reported in active range of motion unless noted as AA=active assistive or P=passive range of motion, * denotes pain   Comments:        Treatment   Therapeutic Exercises:  AROM of elbow, fingers, wrist    Manual Therapy  Grade 2 joint mobs elbow    Modalities:  Patient has been made aware of potential contraindications and possible risks associated with the use of the following modalities and has agreed.  Moist Heat (97579):  Location: L elbow; Position: sitting; Duration: 10 minutes Temperature: 160° F   Modality treatment resulted in decreased pain and improved range of motion.  Patient reports no adverse reaction to treatment.    Current Home Program (not performed this date except as noted above):   PROM of elbow  Wrist and finger motion  Hand edema exercises  Fitted pt with elastomer for left elbow scar 5/17/17     ASSESSMENT   Pt tolerated session okay, pain with joint mobilizations but subsides shortly after. Pt was fitted with static progressive splint by [employer] representative. Pt educated on wear and care of splint.    Pain after treatment: 2/10.    Compliant with therapy visits and home exercise program: Yes  Progress toward discharge/long term goals: good progress    Patient would continue to benefit from skilled therapy to  increase strength/stability, increase range of motion, decrease pain, increase scar tissue mobility to address remaining functional limitation(s) in ADLs and IADLs.     Result of above outlined education: Verbalizes understanding and Demonstrates understanding        Goals:          To be obtained by end of this plan of care:  Continue progressing toward goals.  1. Patient independent with modified and progressed home exercise program. (MET)  2. Patient will decrease involved elbow pain to 1/10 to aid in normalization of upper extremity movements to aid activities of independent daily living, tolerating repetitive overhead/upper extremity activity. (Progressing)  3. Patient will increase involved elbow active range of motion to WFL ° to aid in normalization of upper extremity movements to aid activities of independent daily living. (Progressing)   4. Patient will increase involved elbow strength to within functional limits to aid in normalization of upper extremity movements to aid activities of independent daily living. (Progressing)  5. Patient will be able to sleep 6 hours without disruption from pain. (Progressing)  6. Patient will increase involved wrist active range of motion to 50° for flexion and extension to aid in completion of self care tasks. (MET)  7. DASH: Patient will complete form to reflect an improved score from initial score of 90 to less than or equal to 45 (scored 0-100; a higher score indicates greater disability) to indicate pt reported improvement in function/disability/impairment (minimal detectable change: 15 points).    PLAN   Modalities for pain and increase ROM; AA/PROM of wrist and FA and fingers; scar management  Kinesiotaping for hand/wrist/forearm edema  PROGRESS NOTE NEXT VISIT  THERAPY DAILY BILLING   Primary Insurance: [employer]  Secondary Insurance: N/A    Evaluation Procedures:  No evaluation codes were used on this date of service    Timed  Procedures:  Manual Therapy, 15 minutes  Therapeutic Exercise, 15 minutes    Untimed Procedures:  Hot/Cold Pack Therapy    Total Treatment Time: 40 minutes    Physician Signature on file.    spoke with Dr Madison, case discussed, will admit patient

## 2017-09-27 NOTE — ED ADULT NURSE REASSESSMENT NOTE - NS ED NURSE REASSESS COMMENT FT1
telephone report given to IVANIA Hightower 1E. pt reported some relief of pain from previous PRN dose of morphine. pt transported to 1E

## 2017-09-27 NOTE — H&P ADULT - NSHPLABSRESULTS_GEN_ALL_CORE
Hematocrit: 47.1 % <H> ( @ 12:48)  Hemoglobin: 14.8 g/dL ( @ 12:48)  RBC Count: 5.25 M/uL <H> ( @ 12:48)  Platelet Count - Automated: 205 K/uL ( @ 12:48)  WBC Count: 5.0 K/uL ( @ 12:48)          138  |  104  |  7   ----------------------------<  96  4.5   |  26  |  0.83    Ca    9.7      27 Sep 2017 12:48    TPro  8.7<H>  /  Alb  3.8  /  TBili  0.6  /  DBili  x   /  AST  33  /  ALT  27  /  AlkPhos  90                      Urinalysis Basic - ( 27 Sep 2017 14:27 )    Color: Yellow / Appearance: Clear / S.010 / pH: x  Gluc: x / Ketone: Negative  / Bili: Negative / Urobili: Negative mg/dL   Blood: x / Protein: Negative mg/dL / Nitrite: Negative   Leuk Esterase: Negative / RBC: x / WBC x   Sq Epi: x / Non Sq Epi: x / Bacteria: x        Bilirubin Total, Serum: 0.6 mg/dL ( @ 12:48)  Alanine Aminotransferase (ALT/SGPT): 27 U/L DA ( @ 12:48)  Albumin, Serum: 3.8 g/dL ( @ 12:48)  Alkaline Phosphatase, Serum: 90 U/L ( @ 12:48)  Aspartate Aminotransferase (AST/SGOT): 33 U/L ( @ 12:48)            CT abdomen: sigmoid diverticulitis

## 2017-09-27 NOTE — H&P ADULT - FAMILY HISTORY
Mother  Still living? Unknown  Family history of diabetes mellitus, Age at diagnosis: Age Unknown     Father  Still living? Unknown  Family history of heart disease, Age at diagnosis: Age Unknown

## 2017-09-27 NOTE — ED PROVIDER NOTE - OBJECTIVE STATEMENT
61 yo female presents with diarrhea,  left lower quadrant cramping, mild nausea x 5 days, denies fever.  denies vomiting, states has hx of diverticulitis.  no recent antibiotic use.  states she called her GI Dr Laws yesterday, he prescribed a medication for the diarrhea, but it did not help.  hx of ibs.  PMD Dr Sobeida Hernandez, Surgeon Dr Valdes  patient had her lap band removed many years ago

## 2017-09-27 NOTE — PROVIDER CONTACT NOTE (OTHER) - SITUATION
Patient had a blood glucose of 59. Orange Juice Given.  called. IV fluids changed. Rechecked 15 minutes later and it was 82. Doctor aware. Continue to monitor

## 2017-09-27 NOTE — H&P ADULT - NSHPPHYSICALEXAM_GEN_ALL_CORE
PHYSICAL EXAM:  GENERAL: No apparent distress, well-groomed, well-developed  HEAD:  Atraumatic, Normocephalic  EYES: conjunctiva and sclera clear  ENMT: Moist mucous membranes  NECK: Supple, No Jugular venous distension  NERVOUS SYSTEM:  Alert & Oriented X3, Good concentration; Bilateral lower extremity mobile, sensation to light touch intact  CHEST/LUNG: Clear to auscultation bilaterally; No rales, rhonchi, wheezing, or rubs  HEART: Regular rate and rhythm; No murmurs, rubs, or gallops  ABDOMEN: Soft, Nontender, Nondistended; Bowel sounds present, obese, no focal ttp  EXTREMITIES:  2+ Peripheral Pulses, No clubbing or cyanosis  LYMPH: No lymphadenopathy noted  SKIN: No rashes or lesions

## 2017-09-27 NOTE — ED ADULT NURSE NOTE - PMH
Diverticulitis    Essential hypertension    Ex-Cigarette Smoker    GERD (Gastroesophageal Reflux Disease)    Gout    Irritable bowel syndrome, unspecified type    Morbid Obesity  BMI 52  Osteoarthritis    Overactive Bladder    Pancreatitis Chronic    Sleep Apnea- mild  per pt. no treatment  Type 2 diabetes mellitus

## 2017-09-27 NOTE — ED PROVIDER NOTE - CARE PLAN
Principal Discharge DX:	Abdominal pain  Secondary Diagnosis:	Diarrhea, unspecified type Principal Discharge DX:	Diverticulitis  Secondary Diagnosis:	Diarrhea, unspecified type

## 2017-09-28 LAB
ALBUMIN SERPL ELPH-MCNC: 3.3 G/DL — SIGNIFICANT CHANGE UP (ref 3.3–5)
ALP SERPL-CCNC: 74 U/L — SIGNIFICANT CHANGE UP (ref 30–120)
ALT FLD-CCNC: 24 U/L DA — SIGNIFICANT CHANGE UP (ref 10–60)
ANION GAP SERPL CALC-SCNC: 8 MMOL/L — SIGNIFICANT CHANGE UP (ref 5–17)
AST SERPL-CCNC: 23 U/L — SIGNIFICANT CHANGE UP (ref 10–40)
BILIRUB SERPL-MCNC: 0.5 MG/DL — SIGNIFICANT CHANGE UP (ref 0.2–1.2)
BUN SERPL-MCNC: 5 MG/DL — LOW (ref 7–23)
C DIFF BY PCR RESULT: SIGNIFICANT CHANGE UP
C DIFF TOX GENS STL QL NAA+PROBE: SIGNIFICANT CHANGE UP
CALCIUM SERPL-MCNC: 9.2 MG/DL — SIGNIFICANT CHANGE UP (ref 8.4–10.5)
CHLORIDE SERPL-SCNC: 106 MMOL/L — SIGNIFICANT CHANGE UP (ref 96–108)
CO2 SERPL-SCNC: 26 MMOL/L — SIGNIFICANT CHANGE UP (ref 22–31)
CREAT SERPL-MCNC: 0.79 MG/DL — SIGNIFICANT CHANGE UP (ref 0.5–1.3)
CULTURE RESULTS: SIGNIFICANT CHANGE UP
GLUCOSE SERPL-MCNC: 104 MG/DL — HIGH (ref 70–99)
HCT VFR BLD CALC: 40.1 % — SIGNIFICANT CHANGE UP (ref 34.5–45)
HGB BLD-MCNC: 12.5 G/DL — SIGNIFICANT CHANGE UP (ref 11.5–15.5)
MCHC RBC-ENTMCNC: 28.5 PG — SIGNIFICANT CHANGE UP (ref 27–34)
MCHC RBC-ENTMCNC: 31.3 GM/DL — LOW (ref 32–36)
MCV RBC AUTO: 91 FL — SIGNIFICANT CHANGE UP (ref 80–100)
PLATELET # BLD AUTO: 257 K/UL — SIGNIFICANT CHANGE UP (ref 150–400)
POTASSIUM SERPL-MCNC: 3.5 MMOL/L — SIGNIFICANT CHANGE UP (ref 3.5–5.3)
POTASSIUM SERPL-SCNC: 3.5 MMOL/L — SIGNIFICANT CHANGE UP (ref 3.5–5.3)
PROT SERPL-MCNC: 7.4 G/DL — SIGNIFICANT CHANGE UP (ref 6–8.3)
RBC # BLD: 4.4 M/UL — SIGNIFICANT CHANGE UP (ref 3.8–5.2)
RBC # FLD: 15.8 % — HIGH (ref 10.3–14.5)
SODIUM SERPL-SCNC: 140 MMOL/L — SIGNIFICANT CHANGE UP (ref 135–145)
SPECIMEN SOURCE: SIGNIFICANT CHANGE UP
WBC # BLD: 7.7 K/UL — SIGNIFICANT CHANGE UP (ref 3.8–10.5)
WBC # FLD AUTO: 7.7 K/UL — SIGNIFICANT CHANGE UP (ref 3.8–10.5)

## 2017-09-28 PROCEDURE — 99233 SBSQ HOSP IP/OBS HIGH 50: CPT

## 2017-09-28 RX ORDER — SODIUM CHLORIDE 9 MG/ML
1000 INJECTION, SOLUTION INTRAVENOUS
Qty: 0 | Refills: 0 | Status: DISCONTINUED | OUTPATIENT
Start: 2017-09-28 | End: 2017-09-28

## 2017-09-28 RX ORDER — SODIUM CHLORIDE 9 MG/ML
1000 INJECTION, SOLUTION INTRAVENOUS
Qty: 0 | Refills: 0 | Status: DISCONTINUED | OUTPATIENT
Start: 2017-09-28 | End: 2017-10-01

## 2017-09-28 RX ORDER — DEXTROSE 50 % IN WATER 50 %
1 SYRINGE (ML) INTRAVENOUS ONCE
Qty: 0 | Refills: 0 | Status: DISCONTINUED | OUTPATIENT
Start: 2017-09-28 | End: 2017-10-02

## 2017-09-28 RX ORDER — ENOXAPARIN SODIUM 100 MG/ML
40 INJECTION SUBCUTANEOUS DAILY
Qty: 0 | Refills: 0 | Status: DISCONTINUED | OUTPATIENT
Start: 2017-09-28 | End: 2017-10-02

## 2017-09-28 RX ORDER — MORPHINE SULFATE 50 MG/1
4 CAPSULE, EXTENDED RELEASE ORAL EVERY 4 HOURS
Qty: 0 | Refills: 0 | Status: DISCONTINUED | OUTPATIENT
Start: 2017-09-28 | End: 2017-10-02

## 2017-09-28 RX ORDER — DEXTROSE 50 % IN WATER 50 %
1 SYRINGE (ML) INTRAVENOUS ONCE
Qty: 0 | Refills: 0 | Status: COMPLETED | OUTPATIENT
Start: 2017-09-28 | End: 2017-09-28

## 2017-09-28 RX ORDER — MORPHINE SULFATE 50 MG/1
2 CAPSULE, EXTENDED RELEASE ORAL EVERY 4 HOURS
Qty: 0 | Refills: 0 | Status: DISCONTINUED | OUTPATIENT
Start: 2017-09-28 | End: 2017-10-02

## 2017-09-28 RX ADMIN — PANTOPRAZOLE SODIUM 40 MILLIGRAM(S): 20 TABLET, DELAYED RELEASE ORAL at 07:58

## 2017-09-28 RX ADMIN — ATENOLOL 100 MILLIGRAM(S): 25 TABLET ORAL at 05:08

## 2017-09-28 RX ADMIN — Medication 200 MILLIGRAM(S): at 18:22

## 2017-09-28 RX ADMIN — Medication 0.6 MILLIGRAM(S): at 11:36

## 2017-09-28 RX ADMIN — SODIUM CHLORIDE 75 MILLILITER(S): 9 INJECTION, SOLUTION INTRAVENOUS at 21:42

## 2017-09-28 RX ADMIN — MORPHINE SULFATE 4 MILLIGRAM(S): 50 CAPSULE, EXTENDED RELEASE ORAL at 14:05

## 2017-09-28 RX ADMIN — Medication 1 DOSE(S): at 22:15

## 2017-09-28 RX ADMIN — Medication 100 MILLIGRAM(S): at 14:04

## 2017-09-28 RX ADMIN — GABAPENTIN 300 MILLIGRAM(S): 400 CAPSULE ORAL at 18:22

## 2017-09-28 RX ADMIN — SODIUM CHLORIDE 75 MILLILITER(S): 9 INJECTION, SOLUTION INTRAVENOUS at 14:04

## 2017-09-28 RX ADMIN — ENOXAPARIN SODIUM 40 MILLIGRAM(S): 100 INJECTION SUBCUTANEOUS at 14:04

## 2017-09-28 RX ADMIN — Medication 100 MILLIGRAM(S): at 21:02

## 2017-09-28 RX ADMIN — Medication 300 MILLIGRAM(S): at 11:36

## 2017-09-28 RX ADMIN — SODIUM CHLORIDE 75 MILLILITER(S): 9 INJECTION, SOLUTION INTRAVENOUS at 16:51

## 2017-09-28 RX ADMIN — Medication 200 MILLIGRAM(S): at 05:07

## 2017-09-28 RX ADMIN — MORPHINE SULFATE 4 MILLIGRAM(S): 50 CAPSULE, EXTENDED RELEASE ORAL at 14:34

## 2017-09-28 RX ADMIN — GABAPENTIN 300 MILLIGRAM(S): 400 CAPSULE ORAL at 05:08

## 2017-09-28 RX ADMIN — AMLODIPINE BESYLATE 5 MILLIGRAM(S): 2.5 TABLET ORAL at 05:08

## 2017-09-28 RX ADMIN — Medication 100 MILLIGRAM(S): at 06:01

## 2017-09-29 LAB
ALBUMIN SERPL ELPH-MCNC: 3.4 G/DL — SIGNIFICANT CHANGE UP (ref 3.3–5)
ALP SERPL-CCNC: 79 U/L — SIGNIFICANT CHANGE UP (ref 30–120)
ALT FLD-CCNC: 28 U/L DA — SIGNIFICANT CHANGE UP (ref 10–60)
ANION GAP SERPL CALC-SCNC: 12 MMOL/L — SIGNIFICANT CHANGE UP (ref 5–17)
AST SERPL-CCNC: 35 U/L — SIGNIFICANT CHANGE UP (ref 10–40)
BILIRUB SERPL-MCNC: 0.5 MG/DL — SIGNIFICANT CHANGE UP (ref 0.2–1.2)
BUN SERPL-MCNC: 4 MG/DL — LOW (ref 7–23)
CALCIUM SERPL-MCNC: 9.1 MG/DL — SIGNIFICANT CHANGE UP (ref 8.4–10.5)
CHLORIDE SERPL-SCNC: 106 MMOL/L — SIGNIFICANT CHANGE UP (ref 96–108)
CO2 SERPL-SCNC: 23 MMOL/L — SIGNIFICANT CHANGE UP (ref 22–31)
CREAT SERPL-MCNC: 0.73 MG/DL — SIGNIFICANT CHANGE UP (ref 0.5–1.3)
GLUCOSE SERPL-MCNC: 117 MG/DL — HIGH (ref 70–99)
HCT VFR BLD CALC: 41.2 % — SIGNIFICANT CHANGE UP (ref 34.5–45)
HGB BLD-MCNC: 13.1 G/DL — SIGNIFICANT CHANGE UP (ref 11.5–15.5)
MCHC RBC-ENTMCNC: 28.6 PG — SIGNIFICANT CHANGE UP (ref 27–34)
MCHC RBC-ENTMCNC: 31.9 GM/DL — LOW (ref 32–36)
MCV RBC AUTO: 89.6 FL — SIGNIFICANT CHANGE UP (ref 80–100)
PLATELET # BLD AUTO: 247 K/UL — SIGNIFICANT CHANGE UP (ref 150–400)
POTASSIUM SERPL-MCNC: 4.3 MMOL/L — SIGNIFICANT CHANGE UP (ref 3.5–5.3)
POTASSIUM SERPL-SCNC: 4.3 MMOL/L — SIGNIFICANT CHANGE UP (ref 3.5–5.3)
PROT SERPL-MCNC: 7.1 G/DL — SIGNIFICANT CHANGE UP (ref 6–8.3)
RBC # BLD: 4.6 M/UL — SIGNIFICANT CHANGE UP (ref 3.8–5.2)
RBC # FLD: 14.9 % — HIGH (ref 10.3–14.5)
SODIUM SERPL-SCNC: 141 MMOL/L — SIGNIFICANT CHANGE UP (ref 135–145)
WBC # BLD: 9.7 K/UL — SIGNIFICANT CHANGE UP (ref 3.8–10.5)
WBC # FLD AUTO: 9.7 K/UL — SIGNIFICANT CHANGE UP (ref 3.8–10.5)

## 2017-09-29 PROCEDURE — 99233 SBSQ HOSP IP/OBS HIGH 50: CPT

## 2017-09-29 RX ORDER — LACTOBACILLUS ACIDOPHILUS 100MM CELL
2 CAPSULE ORAL DAILY
Qty: 0 | Refills: 0 | Status: DISCONTINUED | OUTPATIENT
Start: 2017-09-29 | End: 2017-10-02

## 2017-09-29 RX ADMIN — Medication 100 MILLIGRAM(S): at 22:02

## 2017-09-29 RX ADMIN — Medication 2 TABLET(S): at 12:32

## 2017-09-29 RX ADMIN — ENOXAPARIN SODIUM 40 MILLIGRAM(S): 100 INJECTION SUBCUTANEOUS at 12:32

## 2017-09-29 RX ADMIN — Medication 200 MILLIGRAM(S): at 05:17

## 2017-09-29 RX ADMIN — AMLODIPINE BESYLATE 5 MILLIGRAM(S): 2.5 TABLET ORAL at 05:17

## 2017-09-29 RX ADMIN — Medication 100 MILLIGRAM(S): at 05:17

## 2017-09-29 RX ADMIN — PANTOPRAZOLE SODIUM 40 MILLIGRAM(S): 20 TABLET, DELAYED RELEASE ORAL at 05:17

## 2017-09-29 RX ADMIN — MORPHINE SULFATE 2 MILLIGRAM(S): 50 CAPSULE, EXTENDED RELEASE ORAL at 22:54

## 2017-09-29 RX ADMIN — GABAPENTIN 300 MILLIGRAM(S): 400 CAPSULE ORAL at 17:16

## 2017-09-29 RX ADMIN — Medication 200 MILLIGRAM(S): at 17:16

## 2017-09-29 RX ADMIN — ATENOLOL 100 MILLIGRAM(S): 25 TABLET ORAL at 05:17

## 2017-09-29 RX ADMIN — MORPHINE SULFATE 2 MILLIGRAM(S): 50 CAPSULE, EXTENDED RELEASE ORAL at 22:13

## 2017-09-29 RX ADMIN — Medication 300 MILLIGRAM(S): at 12:32

## 2017-09-29 RX ADMIN — GABAPENTIN 300 MILLIGRAM(S): 400 CAPSULE ORAL at 05:17

## 2017-09-29 RX ADMIN — Medication 100 MILLIGRAM(S): at 14:12

## 2017-09-29 RX ADMIN — Medication 0.6 MILLIGRAM(S): at 12:32

## 2017-09-29 NOTE — DIETITIAN INITIAL EVALUATION ADULT. - OTHER INFO
62F adm with diarrhea x5 days. States everything she consumes at present time is going right through her. Per discussion c MD, pt advanced to BRAT diet; was previously on Full liquids, lactose free and tolerating well c continued loose stool. Per ct of abd, diverticulosis noted. Discussed progression to low fiber diet, lactose free. Pt started on probiotic today, which pt states she was also taking pta. States she has hx of IBS; reports consuming salads xfew days prior to the onset of diarrhea. On 10d abx course. States she sees a GI MD and has f/u appt. Pt also noted c hx lap band (2007), but had it removed this past March; dumping syndrome unlikely at present time. Pt c hx DM, A1c 5.8%; hypoglycemia noted t/o adm, on D5 at present. Skin intact.

## 2017-09-30 DIAGNOSIS — E16.2 HYPOGLYCEMIA, UNSPECIFIED: ICD-10-CM

## 2017-09-30 LAB
ALBUMIN SERPL ELPH-MCNC: 3.5 G/DL — SIGNIFICANT CHANGE UP (ref 3.3–5)
ALP SERPL-CCNC: 80 U/L — SIGNIFICANT CHANGE UP (ref 30–120)
ALT FLD-CCNC: 29 U/L DA — SIGNIFICANT CHANGE UP (ref 10–60)
ANION GAP SERPL CALC-SCNC: 10 MMOL/L — SIGNIFICANT CHANGE UP (ref 5–17)
AST SERPL-CCNC: 29 U/L — SIGNIFICANT CHANGE UP (ref 10–40)
BILIRUB SERPL-MCNC: 0.4 MG/DL — SIGNIFICANT CHANGE UP (ref 0.2–1.2)
BUN SERPL-MCNC: 4 MG/DL — LOW (ref 7–23)
CALCIUM SERPL-MCNC: 9.5 MG/DL — SIGNIFICANT CHANGE UP (ref 8.4–10.5)
CHLORIDE SERPL-SCNC: 105 MMOL/L — SIGNIFICANT CHANGE UP (ref 96–108)
CO2 SERPL-SCNC: 25 MMOL/L — SIGNIFICANT CHANGE UP (ref 22–31)
CREAT SERPL-MCNC: 0.78 MG/DL — SIGNIFICANT CHANGE UP (ref 0.5–1.3)
CULTURE RESULTS: SIGNIFICANT CHANGE UP
GLUCOSE SERPL-MCNC: 112 MG/DL — HIGH (ref 70–99)
HCT VFR BLD CALC: 43 % — SIGNIFICANT CHANGE UP (ref 34.5–45)
HGB BLD-MCNC: 13.7 G/DL — SIGNIFICANT CHANGE UP (ref 11.5–15.5)
MCHC RBC-ENTMCNC: 28.8 PG — SIGNIFICANT CHANGE UP (ref 27–34)
MCHC RBC-ENTMCNC: 31.9 GM/DL — LOW (ref 32–36)
MCV RBC AUTO: 90.2 FL — SIGNIFICANT CHANGE UP (ref 80–100)
PLATELET # BLD AUTO: 260 K/UL — SIGNIFICANT CHANGE UP (ref 150–400)
POTASSIUM SERPL-MCNC: 3.5 MMOL/L — SIGNIFICANT CHANGE UP (ref 3.5–5.3)
POTASSIUM SERPL-SCNC: 3.5 MMOL/L — SIGNIFICANT CHANGE UP (ref 3.5–5.3)
PROT SERPL-MCNC: 7.9 G/DL — SIGNIFICANT CHANGE UP (ref 6–8.3)
RBC # BLD: 4.77 M/UL — SIGNIFICANT CHANGE UP (ref 3.8–5.2)
RBC # FLD: 15.2 % — HIGH (ref 10.3–14.5)
SODIUM SERPL-SCNC: 140 MMOL/L — SIGNIFICANT CHANGE UP (ref 135–145)
SPECIMEN SOURCE: SIGNIFICANT CHANGE UP
WBC # BLD: 8 K/UL — SIGNIFICANT CHANGE UP (ref 3.8–10.5)
WBC # FLD AUTO: 8 K/UL — SIGNIFICANT CHANGE UP (ref 3.8–10.5)

## 2017-09-30 PROCEDURE — 99232 SBSQ HOSP IP/OBS MODERATE 35: CPT

## 2017-09-30 RX ADMIN — GABAPENTIN 300 MILLIGRAM(S): 400 CAPSULE ORAL at 18:01

## 2017-09-30 RX ADMIN — AMLODIPINE BESYLATE 5 MILLIGRAM(S): 2.5 TABLET ORAL at 06:11

## 2017-09-30 RX ADMIN — SODIUM CHLORIDE 75 MILLILITER(S): 9 INJECTION, SOLUTION INTRAVENOUS at 21:19

## 2017-09-30 RX ADMIN — ENOXAPARIN SODIUM 40 MILLIGRAM(S): 100 INJECTION SUBCUTANEOUS at 11:46

## 2017-09-30 RX ADMIN — Medication 200 MILLIGRAM(S): at 06:09

## 2017-09-30 RX ADMIN — SODIUM CHLORIDE 75 MILLILITER(S): 9 INJECTION, SOLUTION INTRAVENOUS at 06:09

## 2017-09-30 RX ADMIN — Medication 200 MILLIGRAM(S): at 18:01

## 2017-09-30 RX ADMIN — Medication 300 MILLIGRAM(S): at 11:46

## 2017-09-30 RX ADMIN — GABAPENTIN 300 MILLIGRAM(S): 400 CAPSULE ORAL at 06:09

## 2017-09-30 RX ADMIN — Medication 100 MILLIGRAM(S): at 21:17

## 2017-09-30 RX ADMIN — Medication 100 MILLIGRAM(S): at 13:09

## 2017-09-30 RX ADMIN — Medication 100 MILLIGRAM(S): at 06:09

## 2017-09-30 RX ADMIN — Medication 2 TABLET(S): at 11:46

## 2017-09-30 RX ADMIN — PANTOPRAZOLE SODIUM 40 MILLIGRAM(S): 20 TABLET, DELAYED RELEASE ORAL at 06:09

## 2017-09-30 RX ADMIN — ATENOLOL 100 MILLIGRAM(S): 25 TABLET ORAL at 06:09

## 2017-09-30 NOTE — CONSULT NOTE ADULT - SUBJECTIVE AND OBJECTIVE BOX
62 year old female presents with non bloody diarrhea ct scan suggest thickened sigmoid wall ? diverticulitis vs colitis    rec   stool for culture, c.dif, wbc's guaiac  empiric cipro and flagyl  lactose free diet full liquids  d5 ns at 50 cc pr hr  probiotic
Patient is a 62y old  Female who presents with a chief complaint of diarrhea/abdominal pain (27 Sep 2017 21:58)      Reason For Consult:     HPI:  62f morbid obesity, gout, GERD, diabetes with neuropathy, HTN p/w diarrhea for 5 days found to have sigmoid diverticulitis on CT abdomen done in ER.  Given cipro/flagyl/IVF in ER.  Does not feel well to go home and take oral antibiotics.  Some diffuse abdominal soreness on left side, no pain.  Unable to keep food in her GI system for long; 'goes right through'.  feels weak.  no fever or chills.  no prior h/o constipation or diverticulitis.  h/o gastric lap band in 2007. (27 Sep 2017 17:45)      PAST MEDICAL & SURGICAL HISTORY:  Essential hypertension  Irritable bowel syndrome, unspecified type  Type 2 diabetes mellitus  GERD (Gastroesophageal Reflux Disease)  Osteoarthritis  Sleep Apnea- mild: per pt. no treatment  Overactive Bladder  Ex-Cigarette Smoker  Gout  Pancreatitis Chronic  Diverticulitis  Morbid Obesity: BMI 52  OAB (overactive bladder): s/p Interstim insertion-04/2016  S/P D&C (status post dilation and curettage): 2010 and 2014  S/P hysterectomy: 2014  History of kidney stones: s/p percutaneous stone extraction 2011  History of Cholecystectomy- 2011/April  Lap-Band Surgery - 2006: Removal of Lap band- 03/2017      FAMILY HISTORY:  Family history of heart disease (Father)  Family history of diabetes mellitus (Mother)        Social History:    MEDICATIONS  (STANDING):  allopurinol 300 milliGRAM(s) Oral daily  ATENolol  Tablet 100 milliGRAM(s) Oral daily  amLODIPine   Tablet 5 milliGRAM(s) Oral daily  pantoprazole    Tablet 40 milliGRAM(s) Oral before breakfast  ciprofloxacin   IVPB 400 milliGRAM(s) IV Intermittent every 12 hours  metroNIDAZOLE  IVPB 500 milliGRAM(s) IV Intermittent every 8 hours  dextrose 5%. 1000 milliLiter(s) (50 mL/Hr) IV Continuous <Continuous>  dextrose 50% Injectable 12.5 Gram(s) IV Push once  dextrose 50% Injectable 25 Gram(s) IV Push once  dextrose 50% Injectable 25 Gram(s) IV Push once  gabapentin 300 milliGRAM(s) Oral two times a day  enoxaparin Injectable 40 milliGRAM(s) SubCutaneous daily  dextrose 5% + sodium chloride 0.45%. 1000 milliLiter(s) (75 mL/Hr) IV Continuous <Continuous>  lactobacillus acidophilus 2 Tablet(s) Oral daily    MEDICATIONS  (PRN):  dextrose Gel 1 Dose(s) Oral once PRN Blood Glucose LESS THAN 70 milliGRAM(s)/deciliter  glucagon  Injectable 1 milliGRAM(s) IntraMuscular once PRN Glucose LESS THAN 70 milligrams/deciliter  ALBUTerol    90 MICROgram(s) HFA Inhaler 2 Puff(s) Inhalation every 6 hours PRN Wheezing  morphine  - Injectable 2 milliGRAM(s) IV Push every 4 hours PRN Moderate Pain (4 - 6)  morphine  - Injectable 4 milliGRAM(s) IV Push every 4 hours PRN Severe Pain (7 - 10)  dextrose Gel 1 Dose(s) Oral once PRN Blood Glucose LESS THAN 70 milliGRAM(s)/deciliter        T(C): 36.7 (09-30-17 @ 08:52), Max: 36.7 (09-29-17 @ 16:54)  HR: 67 (09-30-17 @ 08:52) (67 - 76)  BP: 120/69 (09-30-17 @ 08:52) (109/72 - 155/93)  RR: 18 (09-30-17 @ 08:52) (16 - 18)  SpO2: 95% (09-30-17 @ 08:52) (95% - 99%)  Wt(kg): --    PHYSICAL EXAM:  GENERAL: NAD, well-groomed, well-developed  HEAD:  Atraumatic, Normocephalic  NECK: Supple, No JVD, Normal thyroid  CHEST/LUNG: Clear to percussion bilaterally; No rales, rhonchi, wheezing, or rubs  HEART: Regular rate and rhythm; No murmurs, rubs, or gallops  ABDOMEN: Soft, Nontender, Nondistended; Bowel sounds present  EXTREMITIES:  2+ Peripheral Pulses, No clubbing, cyanosis, or edema  SKIN: No rashes or lesions    CAPILLARY BLOOD GLUCOSE  124 (30 Sep 2017 07:55)  96 (29 Sep 2017 21:18)  74 (29 Sep 2017 16:54)  96 (29 Sep 2017 11:43)                                13.7   8.0   )-----------( 260      ( 30 Sep 2017 06:36 )             43.0       CMP:  09-30 @ 06:36  SGPT 29  Albumin 3.5   Alk Phos 80   Anion Gap 10   SGOT 29   Total Bili 0.4   BUN 4   Calcium Total 9.5   CO2 25   Chloride 105   Creatinine 0.78   eGFR if AA 94   eGFR if non AA 81   Glucose 112   Potassium 3.5   Protein 7.9   Sodium 140      Thyroid Function Tests:      Diabetes Tests:       Radiology:

## 2017-10-01 LAB
ANION GAP SERPL CALC-SCNC: 10 MMOL/L — SIGNIFICANT CHANGE UP (ref 5–17)
BUN SERPL-MCNC: 10 MG/DL — SIGNIFICANT CHANGE UP (ref 7–23)
CALCIUM SERPL-MCNC: 9.1 MG/DL — SIGNIFICANT CHANGE UP (ref 8.4–10.5)
CHLORIDE SERPL-SCNC: 105 MMOL/L — SIGNIFICANT CHANGE UP (ref 96–108)
CO2 SERPL-SCNC: 25 MMOL/L — SIGNIFICANT CHANGE UP (ref 22–31)
CREAT SERPL-MCNC: 0.89 MG/DL — SIGNIFICANT CHANGE UP (ref 0.5–1.3)
GLUCOSE SERPL-MCNC: 103 MG/DL — HIGH (ref 70–99)
HCT VFR BLD CALC: 42 % — SIGNIFICANT CHANGE UP (ref 34.5–45)
HGB BLD-MCNC: 13 G/DL — SIGNIFICANT CHANGE UP (ref 11.5–15.5)
MCHC RBC-ENTMCNC: 28.1 PG — SIGNIFICANT CHANGE UP (ref 27–34)
MCHC RBC-ENTMCNC: 31.1 GM/DL — LOW (ref 32–36)
MCV RBC AUTO: 90.2 FL — SIGNIFICANT CHANGE UP (ref 80–100)
PLATELET # BLD AUTO: 266 K/UL — SIGNIFICANT CHANGE UP (ref 150–400)
POTASSIUM SERPL-MCNC: 3.8 MMOL/L — SIGNIFICANT CHANGE UP (ref 3.5–5.3)
POTASSIUM SERPL-SCNC: 3.8 MMOL/L — SIGNIFICANT CHANGE UP (ref 3.5–5.3)
RBC # BLD: 4.65 M/UL — SIGNIFICANT CHANGE UP (ref 3.8–5.2)
RBC # FLD: 15 % — HIGH (ref 10.3–14.5)
SODIUM SERPL-SCNC: 140 MMOL/L — SIGNIFICANT CHANGE UP (ref 135–145)
WBC # BLD: 8.4 K/UL — SIGNIFICANT CHANGE UP (ref 3.8–10.5)
WBC # FLD AUTO: 8.4 K/UL — SIGNIFICANT CHANGE UP (ref 3.8–10.5)

## 2017-10-01 PROCEDURE — 99232 SBSQ HOSP IP/OBS MODERATE 35: CPT

## 2017-10-01 RX ORDER — CIPROFLOXACIN LACTATE 400MG/40ML
500 VIAL (ML) INTRAVENOUS EVERY 12 HOURS
Qty: 0 | Refills: 0 | Status: DISCONTINUED | OUTPATIENT
Start: 2017-10-01 | End: 2017-10-02

## 2017-10-01 RX ORDER — METRONIDAZOLE 500 MG
500 TABLET ORAL EVERY 8 HOURS
Qty: 0 | Refills: 0 | Status: DISCONTINUED | OUTPATIENT
Start: 2017-10-01 | End: 2017-10-02

## 2017-10-01 RX ADMIN — Medication 500 MILLIGRAM(S): at 21:11

## 2017-10-01 RX ADMIN — Medication 2 TABLET(S): at 11:46

## 2017-10-01 RX ADMIN — PANTOPRAZOLE SODIUM 40 MILLIGRAM(S): 20 TABLET, DELAYED RELEASE ORAL at 05:33

## 2017-10-01 RX ADMIN — ENOXAPARIN SODIUM 40 MILLIGRAM(S): 100 INJECTION SUBCUTANEOUS at 11:46

## 2017-10-01 RX ADMIN — ATENOLOL 100 MILLIGRAM(S): 25 TABLET ORAL at 05:32

## 2017-10-01 RX ADMIN — Medication 500 MILLIGRAM(S): at 17:25

## 2017-10-01 RX ADMIN — Medication 100 MILLIGRAM(S): at 05:33

## 2017-10-01 RX ADMIN — Medication 200 MILLIGRAM(S): at 06:31

## 2017-10-01 RX ADMIN — AMLODIPINE BESYLATE 5 MILLIGRAM(S): 2.5 TABLET ORAL at 05:33

## 2017-10-01 RX ADMIN — Medication 300 MILLIGRAM(S): at 11:46

## 2017-10-01 RX ADMIN — GABAPENTIN 300 MILLIGRAM(S): 400 CAPSULE ORAL at 17:25

## 2017-10-01 RX ADMIN — GABAPENTIN 300 MILLIGRAM(S): 400 CAPSULE ORAL at 05:33

## 2017-10-01 RX ADMIN — Medication 500 MILLIGRAM(S): at 13:40

## 2017-10-02 ENCOUNTER — TRANSCRIPTION ENCOUNTER (OUTPATIENT)
Age: 62
End: 2017-10-02

## 2017-10-02 VITALS
RESPIRATION RATE: 18 BRPM | OXYGEN SATURATION: 97 % | DIASTOLIC BLOOD PRESSURE: 73 MMHG | HEART RATE: 78 BPM | SYSTOLIC BLOOD PRESSURE: 140 MMHG | TEMPERATURE: 98 F

## 2017-10-02 PROCEDURE — 99239 HOSP IP/OBS DSCHRG MGMT >30: CPT

## 2017-10-02 RX ORDER — L.ACIDOPH/B.ANIMALIS/B.LONGUM 15B CELL
2 CAPSULE ORAL
Qty: 0 | Refills: 0 | COMMUNITY

## 2017-10-02 RX ORDER — METRONIDAZOLE 500 MG
1 TABLET ORAL
Qty: 21 | Refills: 0 | OUTPATIENT
Start: 2017-10-02 | End: 2017-10-09

## 2017-10-02 RX ORDER — LACTOBACILLUS ACIDOPHILUS 100MM CELL
1 CAPSULE ORAL
Qty: 21 | Refills: 0 | OUTPATIENT
Start: 2017-10-02 | End: 2017-10-09

## 2017-10-02 RX ORDER — CHOLECALCIFEROL (VITAMIN D3) 125 MCG
2 CAPSULE ORAL
Qty: 0 | Refills: 0 | COMMUNITY

## 2017-10-02 RX ORDER — CIPROFLOXACIN LACTATE 400MG/40ML
1 VIAL (ML) INTRAVENOUS
Qty: 14 | Refills: 0 | OUTPATIENT
Start: 2017-10-02 | End: 2017-10-09

## 2017-10-02 RX ORDER — METFORMIN HYDROCHLORIDE 850 MG/1
1 TABLET ORAL
Qty: 0 | Refills: 0 | COMMUNITY

## 2017-10-02 RX ADMIN — Medication 300 MILLIGRAM(S): at 14:29

## 2017-10-02 RX ADMIN — GABAPENTIN 300 MILLIGRAM(S): 400 CAPSULE ORAL at 17:58

## 2017-10-02 RX ADMIN — PANTOPRAZOLE SODIUM 40 MILLIGRAM(S): 20 TABLET, DELAYED RELEASE ORAL at 06:39

## 2017-10-02 RX ADMIN — GABAPENTIN 300 MILLIGRAM(S): 400 CAPSULE ORAL at 06:39

## 2017-10-02 RX ADMIN — Medication 500 MILLIGRAM(S): at 06:39

## 2017-10-02 RX ADMIN — ENOXAPARIN SODIUM 40 MILLIGRAM(S): 100 INJECTION SUBCUTANEOUS at 12:53

## 2017-10-02 RX ADMIN — Medication 500 MILLIGRAM(S): at 17:58

## 2017-10-02 RX ADMIN — Medication 2 TABLET(S): at 12:49

## 2017-10-02 RX ADMIN — Medication 500 MILLIGRAM(S): at 14:37

## 2017-10-02 NOTE — DISCHARGE NOTE ADULT - CARE PROVIDER_API CALL
Sobeida Hernandez (DO), Family Practice  [address] [place] [address]  Phone: [phone]  Fax: [phone]    Marco Antonio Garza (MD), Gastroenterology  [address] [facility] [address]  [place] [address]  Phone: [phone]  Fax: [phone]    Perlman, Craig D (DO), Medicine  [address] [place] [address]  [address]  [place] [address]  Phone: [phone]  Fax: [phone]

## 2017-10-02 NOTE — PROGRESS NOTE ADULT - SUBJECTIVE AND OBJECTIVE BOX
Chief Complaint:        INTERVAL HPI/OVERNIGHT EVENTS: no significant events overnight reported  feeling better.   pt awake and alert.         MEDICATIONS  (STANDING):  allopurinol 300 milliGRAM(s) Oral daily  ATENolol  Tablet 100 milliGRAM(s) Oral daily  amLODIPine   Tablet 5 milliGRAM(s) Oral daily  pantoprazole    Tablet 40 milliGRAM(s) Oral before breakfast  dextrose 5%. 1000 milliLiter(s) (50 mL/Hr) IV Continuous <Continuous>  dextrose 50% Injectable 12.5 Gram(s) IV Push once  dextrose 50% Injectable 25 Gram(s) IV Push once  dextrose 50% Injectable 25 Gram(s) IV Push once  gabapentin 300 milliGRAM(s) Oral two times a day  enoxaparin Injectable 40 milliGRAM(s) SubCutaneous daily  lactobacillus acidophilus 2 Tablet(s) Oral daily  ciprofloxacin     Tablet 500 milliGRAM(s) Oral every 12 hours  metroNIDAZOLE    Tablet 500 milliGRAM(s) Oral every 8 hours    MEDICATIONS  (PRN):  dextrose Gel 1 Dose(s) Oral once PRN Blood Glucose LESS THAN 70 milliGRAM(s)/deciliter  glucagon  Injectable 1 milliGRAM(s) IntraMuscular once PRN Glucose LESS THAN 70 milligrams/deciliter  ALBUTerol    90 MICROgram(s) HFA Inhaler 2 Puff(s) Inhalation every 6 hours PRN Wheezing  morphine  - Injectable 2 milliGRAM(s) IV Push every 4 hours PRN Moderate Pain (4 - 6)  morphine  - Injectable 4 milliGRAM(s) IV Push every 4 hours PRN Severe Pain (7 - 10)  dextrose Gel 1 Dose(s) Oral once PRN Blood Glucose LESS THAN 70 milliGRAM(s)/deciliter            Vital Signs Last 24 Hrs  T(C): 36.9 (02 Oct 2017 09:01), Max: 36.9 (02 Oct 2017 09:01)  T(F): 98.4 (02 Oct 2017 09:01), Max: 98.4 (02 Oct 2017 09:01)  HR: 74 (02 Oct 2017 09:01) (69 - 76)  BP: 107/69 (02 Oct 2017 09:01) (103/72 - 126/77)  BP(mean): --  RR: 18 (02 Oct 2017 09:01) (16 - 18)  SpO2: 96% (02 Oct 2017 09:01) (96% - 99%)    Physical exam:    abd soft, non tender. bs  cv s1s2  chest air entry b/l  ext no pitting edema        LABS:                        13.0   8.4   )-----------( 266      ( 01 Oct 2017 07:42 )             42.0     10-01    140  |  105  |  10  ----------------------------<  103<H>  3.8   |  25  |  0.89    Ca    9.1      01 Oct 2017 07:42            RADIOLOGY & ADDITIONAL TESTS:
Feels better.  Tolerating po intake well.  denies any N/V/D, abdm pain, fever, or chills      Constitutional: No fever, fatigue or weight loss.  Skin: No rash.  Eyes: No recent vision problems or eye pain.  ENT: No congestion, ear pain, or sore throat.  Endocrine: No thyroid problems.  Cardiovascular: No chest pain or palpation.  Respiratory: No cough, shortness of breath, congestion, or wheezing.  Gastrointestinal: No abdominal pain, nausea, vomiting, or diarrhea.  Genitourinary: No dysuria.  Musculoskeletal: No joint swelling.  Neurologic: No headache.      MEDICATIONS  (STANDING):  allopurinol 300 milliGRAM(s) Oral daily  ATENolol  Tablet 100 milliGRAM(s) Oral daily  amLODIPine   Tablet 5 milliGRAM(s) Oral daily  pantoprazole    Tablet 40 milliGRAM(s) Oral before breakfast  ciprofloxacin   IVPB 400 milliGRAM(s) IV Intermittent every 12 hours  metroNIDAZOLE  IVPB 500 milliGRAM(s) IV Intermittent every 8 hours  dextrose 5%. 1000 milliLiter(s) (50 mL/Hr) IV Continuous <Continuous>  dextrose 50% Injectable 12.5 Gram(s) IV Push once  dextrose 50% Injectable 25 Gram(s) IV Push once  dextrose 50% Injectable 25 Gram(s) IV Push once  gabapentin 300 milliGRAM(s) Oral two times a day  enoxaparin Injectable 40 milliGRAM(s) SubCutaneous daily  dextrose 5% + sodium chloride 0.45%. 1000 milliLiter(s) (75 mL/Hr) IV Continuous <Continuous>  lactobacillus acidophilus 2 Tablet(s) Oral daily    MEDICATIONS  (PRN):  dextrose Gel 1 Dose(s) Oral once PRN Blood Glucose LESS THAN 70 milliGRAM(s)/deciliter  glucagon  Injectable 1 milliGRAM(s) IntraMuscular once PRN Glucose LESS THAN 70 milligrams/deciliter  ALBUTerol    90 MICROgram(s) HFA Inhaler 2 Puff(s) Inhalation every 6 hours PRN Wheezing  morphine  - Injectable 2 milliGRAM(s) IV Push every 4 hours PRN Moderate Pain (4 - 6)  morphine  - Injectable 4 milliGRAM(s) IV Push every 4 hours PRN Severe Pain (7 - 10)  dextrose Gel 1 Dose(s) Oral once PRN Blood Glucose LESS THAN 70 milliGRAM(s)/deciliter      Vital Signs Last 24 Hrs  T(C): 36.7 (30 Sep 2017 12:06), Max: 36.7 (29 Sep 2017 16:54)  T(F): 98 (30 Sep 2017 12:06), Max: 98.1 (29 Sep 2017 21:18)  HR: 66 (30 Sep 2017 12:06) (66 - 76)  BP: 111/68 (30 Sep 2017 12:06) (109/72 - 155/93)  BP(mean): --  RR: 17 (30 Sep 2017 12:06) (16 - 18)  SpO2: 97% (30 Sep 2017 12:06) (95% - 99%)      PHYSICAL EXAM-  GENERAL: NAD, well-groomed, well-developed  HEAD:  Atraumatic, Normocephalic  EYES: EOMI, PERRLA, conjunctiva and sclera clear  NECK: Supple, No JVD, Normal thyroid  NERVOUS SYSTEM:  Alert & Oriented X3, Motor Strength 5/5 B/L upper and lower extremities; DTRs 2+ intact and symmetric  CHEST/LUNG: Clear to percussion bilaterally; No rales, rhonchi, wheezing, or rubs  HEART: Regular rate and rhythm; No murmurs, rubs, or gallops  ABDOMEN: Soft, Nontender, Nondistended; Bowel sounds present  EXTREMITIES:  2+ Peripheral Pulses, No clubbing, cyanosis, or edema  SKIN: No rashes or lesions                              13.7   8.0   )-----------( 260      ( 30 Sep 2017 06:36 )             43.0     09-30    140  |  105  |  4<L>  ----------------------------<  112<H>  3.5   |  25  |  0.78    Ca    9.5      30 Sep 2017 06:36    TPro  7.9  /  Alb  3.5  /  TBili  0.4  /  DBili  x   /  AST  29  /  ALT  29  /  AlkPhos  80  09-30
Patient feels better.  Tolerating PO well.  afebrile.  Doing well.  Offers no other complaints      Constitutional: No fever, fatigue or weight loss.  Skin: No rash.  Eyes: No recent vision problems or eye pain.  ENT: No congestion, ear pain, or sore throat.  Endocrine: No thyroid problems.  Cardiovascular: No chest pain or palpation.  Respiratory: No cough, shortness of breath, congestion, or wheezing.  Gastrointestinal: No abdominal pain, nausea, vomiting, or diarrhea.  Genitourinary: No dysuria.  Musculoskeletal: No joint swelling.  Neurologic: No headache.      MEDICATIONS  (STANDING):  allopurinol 300 milliGRAM(s) Oral daily  ATENolol  Tablet 100 milliGRAM(s) Oral daily  amLODIPine   Tablet 5 milliGRAM(s) Oral daily  pantoprazole    Tablet 40 milliGRAM(s) Oral before breakfast  dextrose 5%. 1000 milliLiter(s) (50 mL/Hr) IV Continuous <Continuous>  dextrose 50% Injectable 12.5 Gram(s) IV Push once  dextrose 50% Injectable 25 Gram(s) IV Push once  dextrose 50% Injectable 25 Gram(s) IV Push once  gabapentin 300 milliGRAM(s) Oral two times a day  enoxaparin Injectable 40 milliGRAM(s) SubCutaneous daily  lactobacillus acidophilus 2 Tablet(s) Oral daily  ciprofloxacin     Tablet 500 milliGRAM(s) Oral every 12 hours  metroNIDAZOLE    Tablet 500 milliGRAM(s) Oral every 8 hours    MEDICATIONS  (PRN):  dextrose Gel 1 Dose(s) Oral once PRN Blood Glucose LESS THAN 70 milliGRAM(s)/deciliter  glucagon  Injectable 1 milliGRAM(s) IntraMuscular once PRN Glucose LESS THAN 70 milligrams/deciliter  ALBUTerol    90 MICROgram(s) HFA Inhaler 2 Puff(s) Inhalation every 6 hours PRN Wheezing  morphine  - Injectable 2 milliGRAM(s) IV Push every 4 hours PRN Moderate Pain (4 - 6)  morphine  - Injectable 4 milliGRAM(s) IV Push every 4 hours PRN Severe Pain (7 - 10)  dextrose Gel 1 Dose(s) Oral once PRN Blood Glucose LESS THAN 70 milliGRAM(s)/deciliter        Vital Signs Last 24 Hrs  T(C): 36.7 (02 Oct 2017 05:56), Max: 36.8 (01 Oct 2017 21:15)  T(F): 98 (02 Oct 2017 05:56), Max: 98.2 (01 Oct 2017 21:15)  HR: 75 (02 Oct 2017 05:56) (69 - 76)  BP: 105/68 (02 Oct 2017 05:56) (103/72 - 126/77)  BP(mean): --  RR: 18 (02 Oct 2017 00:36) (16 - 18)  SpO2: 98% (02 Oct 2017 00:36) (97% - 99%)      PHYSICAL EXAM-  GENERAL: NAD, well-groomed, well-developed  HEAD:  Atraumatic, Normocephalic  EYES: EOMI, PERRLA, conjunctiva and sclera clear  NECK: Supple, No JVD, Normal thyroid  NERVOUS SYSTEM:  Alert & Oriented X3, Motor Strength 5/5 B/L upper and lower extremities; DTRs 2+ intact and symmetric  CHEST/LUNG: Clear to percussion bilaterally; No rales, rhonchi, wheezing, or rubs  HEART: Regular rate and rhythm; No murmurs, rubs, or gallops  ABDOMEN: Soft, Nontender, Nondistended; Bowel sounds present  SKIN: No rashes or lesions                              13.0   8.4   )-----------( 266      ( 01 Oct 2017 07:42 )             42.0     10-01    140  |  105  |  10  ----------------------------<  103<H>  3.8   |  25  |  0.89    Ca    9.1      01 Oct 2017 07:42
CAPILLARY BLOOD GLUCOSE  114 (01 Oct 2017 21:35)  101 (01 Oct 2017 16:32)  84 (01 Oct 2017 11:57)  120 (01 Oct 2017 07:53)          Vital Signs Last 24 Hrs  T(C): 36.7 (02 Oct 2017 05:56), Max: 36.8 (01 Oct 2017 21:15)  T(F): 98 (02 Oct 2017 05:56), Max: 98.2 (01 Oct 2017 21:15)  HR: 75 (02 Oct 2017 05:56) (65 - 76)  BP: 105/68 (02 Oct 2017 05:56) (103/72 - 126/77)  BP(mean): --  RR: 18 (02 Oct 2017 00:36) (16 - 18)  SpO2: 98% (02 Oct 2017 00:36) (96% - 99%)    General: WN/WD NAD  Respiratory: CTA B/L  CV: RRR, S1S2, no murmurs, rubs or gallops  Abdominal: Soft, NT, ND +BS, Last BM  Extremities: No edema, + peripheral pulses     10-01    140  |  105  |  10  ----------------------------<  103<H>  3.8   |  25  |  0.89    Ca    9.1      01 Oct 2017 07:42        allopurinol 300 milliGRAM(s) Oral daily  dextrose Gel 1 Dose(s) Oral once PRN  dextrose 50% Injectable 12.5 Gram(s) IV Push once  dextrose 50% Injectable 25 Gram(s) IV Push once  dextrose 50% Injectable 25 Gram(s) IV Push once  glucagon  Injectable 1 milliGRAM(s) IntraMuscular once PRN  dextrose Gel 1 Dose(s) Oral once PRN
Chief Complaint:        INTERVAL HPI/OVERNIGHT EVENTS:  c/o urge to have bm after eating  stools soft          MEDICATIONS  (STANDING):  allopurinol 300 milliGRAM(s) Oral daily  ATENolol  Tablet 100 milliGRAM(s) Oral daily  amLODIPine   Tablet 5 milliGRAM(s) Oral daily  pantoprazole    Tablet 40 milliGRAM(s) Oral before breakfast  ciprofloxacin   IVPB 400 milliGRAM(s) IV Intermittent every 12 hours  metroNIDAZOLE  IVPB 500 milliGRAM(s) IV Intermittent every 8 hours  dextrose 5%. 1000 milliLiter(s) (50 mL/Hr) IV Continuous <Continuous>  dextrose 50% Injectable 12.5 Gram(s) IV Push once  dextrose 50% Injectable 25 Gram(s) IV Push once  dextrose 50% Injectable 25 Gram(s) IV Push once  gabapentin 300 milliGRAM(s) Oral two times a day  colchicine 0.6 milliGRAM(s) Oral daily  enoxaparin Injectable 40 milliGRAM(s) SubCutaneous daily  dextrose 5% + sodium chloride 0.45%. 1000 milliLiter(s) (75 mL/Hr) IV Continuous <Continuous>  lactobacillus acidophilus 2 Tablet(s) Oral daily    MEDICATIONS  (PRN):  dextrose Gel 1 Dose(s) Oral once PRN Blood Glucose LESS THAN 70 milliGRAM(s)/deciliter  glucagon  Injectable 1 milliGRAM(s) IntraMuscular once PRN Glucose LESS THAN 70 milligrams/deciliter  ALBUTerol    90 MICROgram(s) HFA Inhaler 2 Puff(s) Inhalation every 6 hours PRN Wheezing  morphine  - Injectable 2 milliGRAM(s) IV Push every 4 hours PRN Moderate Pain (4 - 6)  morphine  - Injectable 4 milliGRAM(s) IV Push every 4 hours PRN Severe Pain (7 - 10)  dextrose Gel 1 Dose(s) Oral once PRN Blood Glucose LESS THAN 70 milliGRAM(s)/deciliter            Vital Signs Last 24 Hrs  T(C): 36.4 (29 Sep 2017 09:02), Max: 36.8 (28 Sep 2017 14:14)  T(F): 97.5 (29 Sep 2017 09:02), Max: 98.2 (28 Sep 2017 14:14)  HR: 64 (29 Sep 2017 09:02) (64 - 74)  BP: 121/78 (29 Sep 2017 09:02) (110/71 - 144/80)  BP(mean): --  RR: 16 (29 Sep 2017 09:02) (16 - 18)  SpO2: 100% (29 Sep 2017 09:02) (96% - 100%)    Physical exam:  abd soft, obese, mildly tender l abdomen w/o rebound  cv s1s2  chest air entry          LABS:                        13.1   9.7   )-----------( 247      ( 29 Sep 2017 07:22 )             41.2     09-29    141  |  106  |  4<L>  ----------------------------<  117<H>  4.3   |  23  |  0.73    Ca    9.1      29 Sep 2017 07:22    TPro  7.1  /  Alb  3.4  /  TBili  0.5  /  DBili  x   /  AST  35  /  ALT  28  /  AlkPhos  79  09-29          RADIOLOGY & ADDITIONAL TESTS:
Chief Complaint:        INTERVAL HPI/OVERNIGHT EVENTS:feeling better   less diarrhea, less pain        MEDICATIONS  (STANDING):  allopurinol 300 milliGRAM(s) Oral daily  ATENolol  Tablet 100 milliGRAM(s) Oral daily  amLODIPine   Tablet 5 milliGRAM(s) Oral daily  pantoprazole    Tablet 40 milliGRAM(s) Oral before breakfast  ciprofloxacin   IVPB 400 milliGRAM(s) IV Intermittent every 12 hours  metroNIDAZOLE  IVPB 500 milliGRAM(s) IV Intermittent every 8 hours  dextrose 5%. 1000 milliLiter(s) (50 mL/Hr) IV Continuous <Continuous>  dextrose 50% Injectable 12.5 Gram(s) IV Push once  dextrose 50% Injectable 25 Gram(s) IV Push once  dextrose 50% Injectable 25 Gram(s) IV Push once  gabapentin 300 milliGRAM(s) Oral two times a day  enoxaparin Injectable 40 milliGRAM(s) SubCutaneous daily  dextrose 5% + sodium chloride 0.45%. 1000 milliLiter(s) (75 mL/Hr) IV Continuous <Continuous>  lactobacillus acidophilus 2 Tablet(s) Oral daily    MEDICATIONS  (PRN):  dextrose Gel 1 Dose(s) Oral once PRN Blood Glucose LESS THAN 70 milliGRAM(s)/deciliter  glucagon  Injectable 1 milliGRAM(s) IntraMuscular once PRN Glucose LESS THAN 70 milligrams/deciliter  ALBUTerol    90 MICROgram(s) HFA Inhaler 2 Puff(s) Inhalation every 6 hours PRN Wheezing  morphine  - Injectable 2 milliGRAM(s) IV Push every 4 hours PRN Moderate Pain (4 - 6)  morphine  - Injectable 4 milliGRAM(s) IV Push every 4 hours PRN Severe Pain (7 - 10)  dextrose Gel 1 Dose(s) Oral once PRN Blood Glucose LESS THAN 70 milliGRAM(s)/deciliter            Vital Signs Last 24 Hrs  T(C): 36.7 (30 Sep 2017 12:06), Max: 36.7 (29 Sep 2017 16:54)  T(F): 98 (30 Sep 2017 12:06), Max: 98.1 (29 Sep 2017 21:18)  HR: 66 (30 Sep 2017 12:06) (66 - 74)  BP: 111/68 (30 Sep 2017 12:06) (111/68 - 155/93)  BP(mean): --  RR: 17 (30 Sep 2017 12:06) (17 - 18)  SpO2: 97% (30 Sep 2017 12:06) (95% - 99%)    Physical exam:    abd soft, obese, non tender  cv s1s2  chest air entry        LABS:                        13.7   8.0   )-----------( 260      ( 30 Sep 2017 06:36 )             43.0     09-30    140  |  105  |  4<L>  ----------------------------<  112<H>  3.5   |  25  |  0.78    Ca    9.5      30 Sep 2017 06:36    TPro  7.9  /  Alb  3.5  /  TBili  0.4  /  DBili  x   /  AST  29  /  ALT  29  /  AlkPhos  80  09-30          RADIOLOGY & ADDITIONAL TESTS:
Feels better,  Tolerating po intake well.  BG better.  Offers no other complaints    Constitutional: No fever, fatigue or weight loss.  Skin: No rash.  Eyes: No recent vision problems or eye pain.  ENT: No congestion, ear pain, or sore throat.  Endocrine: No thyroid problems.  Cardiovascular: No chest pain or palpation.  Respiratory: No cough, shortness of breath, congestion, or wheezing.  Gastrointestinal: No abdominal pain, nausea, vomiting, or diarrhea.  Genitourinary: No dysuria.  Musculoskeletal: No joint swelling.  Neurologic: No headache.      MEDICATIONS  (STANDING):  allopurinol 300 milliGRAM(s) Oral daily  ATENolol  Tablet 100 milliGRAM(s) Oral daily  amLODIPine   Tablet 5 milliGRAM(s) Oral daily  pantoprazole    Tablet 40 milliGRAM(s) Oral before breakfast  dextrose 5%. 1000 milliLiter(s) (50 mL/Hr) IV Continuous <Continuous>  dextrose 50% Injectable 12.5 Gram(s) IV Push once  dextrose 50% Injectable 25 Gram(s) IV Push once  dextrose 50% Injectable 25 Gram(s) IV Push once  gabapentin 300 milliGRAM(s) Oral two times a day  enoxaparin Injectable 40 milliGRAM(s) SubCutaneous daily  lactobacillus acidophilus 2 Tablet(s) Oral daily  ciprofloxacin     Tablet 500 milliGRAM(s) Oral every 12 hours  metroNIDAZOLE    Tablet 500 milliGRAM(s) Oral every 8 hours    MEDICATIONS  (PRN):  dextrose Gel 1 Dose(s) Oral once PRN Blood Glucose LESS THAN 70 milliGRAM(s)/deciliter  glucagon  Injectable 1 milliGRAM(s) IntraMuscular once PRN Glucose LESS THAN 70 milligrams/deciliter  ALBUTerol    90 MICROgram(s) HFA Inhaler 2 Puff(s) Inhalation every 6 hours PRN Wheezing  morphine  - Injectable 2 milliGRAM(s) IV Push every 4 hours PRN Moderate Pain (4 - 6)  morphine  - Injectable 4 milliGRAM(s) IV Push every 4 hours PRN Severe Pain (7 - 10)  dextrose Gel 1 Dose(s) Oral once PRN Blood Glucose LESS THAN 70 milliGRAM(s)/deciliter      Vital Signs Last 24 Hrs  T(C): 36.4 (01 Oct 2017 09:30), Max: 37.2 (30 Sep 2017 21:13)  T(F): 97.6 (01 Oct 2017 09:30), Max: 98.9 (30 Sep 2017 21:13)  HR: 65 (01 Oct 2017 09:30) (65 - 90)  BP: 106/73 (01 Oct 2017 09:30) (106/73 - 127/88)  BP(mean): --  RR: 18 (01 Oct 2017 09:30) (16 - 18)  SpO2: 96% (01 Oct 2017 09:30) (96% - 100%)      PHYSICAL EXAM-  GENERAL: NAD, well-groomed, well-developed  HEAD:  Atraumatic, Normocephalic  EYES: EOMI, PERRLA, conjunctiva and sclera clear  NECK: Supple, No JVD, Normal thyroid  NERVOUS SYSTEM:  Alert & Oriented X3 moving all extremities  CHEST/LUNG: Clear to percussion bilaterally; No rales, rhonchi, wheezing, or rubs  HEART: Regular rate and rhythm; No murmurs, rubs, or gallops  ABDOMEN: Soft, Nontender, Nondistended; Bowel sounds present  EXTREMITIES:  2+ Peripheral Pulses, No clubbing, cyanosis, or edema  SKIN: No rashes or lesions                              13.0   8.4   )-----------( 266      ( 01 Oct 2017 07:42 )             42.0     10-01    140  |  105  |  10  ----------------------------<  103<H>  3.8   |  25  |  0.89    Ca    9.1      01 Oct 2017 07:42    TPro  7.9  /  Alb  3.5  /  TBili  0.4  /  DBili  x   /  AST  29  /  ALT  29  /  AlkPhos  80  09-30
Patient is a 62y old  Female who presents with a chief complaint of diarrhea/abdominal pain (27 Sep 2017 21:58)      INTERVAL History of Present Illness/OVERNIGHT EVENTS: diarrhea mildly improved.  reports that food passes right thru'  denies abdominal discomfort.  low glucose required D5 in her IVF.    MEDICATIONS  (STANDING):  allopurinol 300 milliGRAM(s) Oral daily  ATENolol  Tablet 100 milliGRAM(s) Oral daily  amLODIPine   Tablet 5 milliGRAM(s) Oral daily  pantoprazole    Tablet 40 milliGRAM(s) Oral before breakfast  ciprofloxacin   IVPB 400 milliGRAM(s) IV Intermittent every 12 hours  metroNIDAZOLE  IVPB 500 milliGRAM(s) IV Intermittent every 8 hours  dextrose 5%. 1000 milliLiter(s) (50 mL/Hr) IV Continuous <Continuous>  dextrose 50% Injectable 12.5 Gram(s) IV Push once  dextrose 50% Injectable 25 Gram(s) IV Push once  dextrose 50% Injectable 25 Gram(s) IV Push once  gabapentin 300 milliGRAM(s) Oral two times a day  colchicine 0.6 milliGRAM(s) Oral daily  enoxaparin Injectable 40 milliGRAM(s) SubCutaneous daily  dextrose 5% + sodium chloride 0.45%. 1000 milliLiter(s) (75 mL/Hr) IV Continuous <Continuous>    MEDICATIONS  (PRN):  dextrose Gel 1 Dose(s) Oral once PRN Blood Glucose LESS THAN 70 milliGRAM(s)/deciliter  glucagon  Injectable 1 milliGRAM(s) IntraMuscular once PRN Glucose LESS THAN 70 milligrams/deciliter  ALBUTerol    90 MICROgram(s) HFA Inhaler 2 Puff(s) Inhalation every 6 hours PRN Wheezing  morphine  - Injectable 2 milliGRAM(s) IV Push every 4 hours PRN Moderate Pain (4 - 6)  morphine  - Injectable 4 milliGRAM(s) IV Push every 4 hours PRN Severe Pain (7 - 10)  dextrose Gel 1 Dose(s) Oral once PRN Blood Glucose LESS THAN 70 milliGRAM(s)/deciliter      Allergies    Celebrex (Hives)  unknown anesthesia type- patient doesn&#x27;t know (Rash)    Intolerances        REVIEW OF SYSTEMS:  Negative unless otherwise specified above.    Vital Signs Last 24 Hrs  T(C): 36.4 (29 Sep 2017 05:42), Max: 36.8 (28 Sep 2017 14:14)  T(F): 97.6 (29 Sep 2017 05:42), Max: 98.2 (28 Sep 2017 14:14)  HR: 68 (29 Sep 2017 05:42) (68 - 74)  BP: 128/71 (29 Sep 2017 05:42) (110/71 - 144/80)  BP(mean): --  RR: 18 (29 Sep 2017 05:42) (18 - 18)  SpO2: 98% (29 Sep 2017 05:42) (96% - 98%)        PHYSICAL EXAM:  GENERAL: No apparent distress  HEAD:  Atraumatic, Normocephalic  EYES: conjunctiva and sclera clear  ENMT: Moist mucous membranes  NECK: Supple, No Jugular venous distension  CHEST/LUNG: Clear to auscultation bilaterally; No rales, rhonchi, wheezing, or rubs  HEART: Regular rate and rhythm; No murmurs, rubs, or gallops  ABDOMEN: Soft, Nontender, Nondistended; Bowel sounds present, obese  EXTREMITIES:  2+ Peripheral Pulses, No clubbing or cyanosis  LYMPH: No lymphadenopathy noted  SKIN: No rashes or lesions  NERVOUS SYSTEM:  Alert & Oriented X3, Good concentration; Bilateral Lower extremity mobile, sensation to light touch intact      LABS:                        13.1   9.7   )-----------( 247      ( 29 Sep 2017 07:22 )             41.2     29 Sep 2017 07:22    141    |  106    |  4      ----------------------------<  117    4.3     |  23     |  0.73     Ca    9.1        29 Sep 2017 07:22    TPro  7.1    /  Alb  3.4    /  TBili  0.5    /  DBili  x      /  AST  35     /  ALT  28     /  AlkPhos  79     29 Sep 2017 07:22      Urinalysis Basic - ( 27 Sep 2017 14:27 )    Color: Yellow / Appearance: Clear / S.010 / pH: x  Gluc: x / Ketone: Negative  / Bili: Negative / Urobili: Negative mg/dL   Blood: x / Protein: Negative mg/dL / Nitrite: Negative   Leuk Esterase: Negative / RBC: x / WBC x   Sq Epi: x / Non Sq Epi: x / Bacteria: x      CAPILLARY BLOOD GLUCOSE  101 (29 Sep 2017 06:29)  89 (29 Sep 2017 06:14)  100 (28 Sep 2017 22:15)  72 (28 Sep 2017 22:00)  85 (28 Sep 2017 21:43)  81 (28 Sep 2017 21:11)  69 (28 Sep 2017 21:09)  103 (28 Sep 2017 18:20)  82 (28 Sep 2017 18:00)  85 (28 Sep 2017 17:40)  90 (28 Sep 2017 17:20)  65 (28 Sep 2017 16:50)  82 (28 Sep 2017 12:03)          RADIOLOGY & ADDITIONAL TESTS:    Images reviewed personally    Consultant Notes Reviewed and Care Discussed with relevant Consultants/Other Providers.
Patient is a 62y old  Female who presents with a chief complaint of diarrhea/abdominal pain (27 Sep 2017 21:58)      INTERVAL History of Present Illness/OVERNIGHT EVENTS: still has diarrhea.  3 times in previous 2 hours.  ate full breakfast - full liquid.    MEDICATIONS  (STANDING):  allopurinol 300 milliGRAM(s) Oral daily  ATENolol  Tablet 100 milliGRAM(s) Oral daily  amLODIPine   Tablet 5 milliGRAM(s) Oral daily  pantoprazole    Tablet 40 milliGRAM(s) Oral before breakfast  ciprofloxacin   IVPB 400 milliGRAM(s) IV Intermittent every 12 hours  metroNIDAZOLE  IVPB 500 milliGRAM(s) IV Intermittent every 8 hours  sodium chloride 0.9%. 1000 milliLiter(s) (100 mL/Hr) IV Continuous <Continuous>  insulin lispro (HumaLOG) corrective regimen sliding scale   SubCutaneous three times a day before meals  dextrose 5%. 1000 milliLiter(s) (50 mL/Hr) IV Continuous <Continuous>  dextrose 50% Injectable 12.5 Gram(s) IV Push once  dextrose 50% Injectable 25 Gram(s) IV Push once  dextrose 50% Injectable 25 Gram(s) IV Push once  gabapentin 300 milliGRAM(s) Oral two times a day  colchicine 0.6 milliGRAM(s) Oral daily    MEDICATIONS  (PRN):  dextrose Gel 1 Dose(s) Oral once PRN Blood Glucose LESS THAN 70 milliGRAM(s)/deciliter  glucagon  Injectable 1 milliGRAM(s) IntraMuscular once PRN Glucose LESS THAN 70 milligrams/deciliter  ALBUTerol    90 MICROgram(s) HFA Inhaler 2 Puff(s) Inhalation every 6 hours PRN Wheezing      Allergies    Celebrex (Hives)  unknown anesthesia type- patient doesn&#x27;t know (Rash)    Intolerances        REVIEW OF SYSTEMS:  Negative unless otherwise specified above.    Vital Signs Last 24 Hrs  T(C): 36.8 (28 Sep 2017 09:00), Max: 37.2 (27 Sep 2017 13:07)  T(F): 98.3 (28 Sep 2017 09:00), Max: 99 (27 Sep 2017 13:07)  HR: 71 (28 Sep 2017 09:00) (71 - 94)  BP: 118/75 (28 Sep 2017 09:00) (103/64 - 143/82)  BP(mean): --  RR: 18 (28 Sep 2017 09:00) (16 - 18)  SpO2: 99% (28 Sep 2017 09:00) (94% - 100%)    Height (cm): 177.8 ( @ 11:42)  Weight (kg): 131.5 ( @ 11:42)  BMI (kg/m2): 41.6 ( @ 11:42)  BSA (m2): 2.44 ( @ 11:42)    PHYSICAL EXAM:  GENERAL: No apparent distress  HEAD:  Atraumatic, Normocephalic  EYES: conjunctiva and sclera clear  ENMT: Moist mucous membranes  NECK: Supple, No Jugular venous distension  CHEST/LUNG: Clear to auscultation bilaterally; No rales, rhonchi, wheezing, or rubs  HEART: Regular rate and rhythm; No murmurs, rubs, or gallops  ABDOMEN: Soft, Nontender, Nondistended; Bowel sounds present, obese  EXTREMITIES:  2+ Peripheral Pulses, No clubbing or cyanosis  LYMPH: No lymphadenopathy noted  SKIN: No rashes or lesions  NERVOUS SYSTEM:  Alert & Oriented X3, Good concentration; Bilateral Lower extremity mobile, sensation to light touch intact      LABS:                        12.5   7.7   )-----------( 257      ( 28 Sep 2017 07:21 )             40.1     28 Sep 2017 07:21    140    |  106    |  5      ----------------------------<  104    3.5     |  26     |  0.79     Ca    9.2        28 Sep 2017 07:21    TPro  7.4    /  Alb  3.3    /  TBili  0.5    /  DBili  x      /  AST  23     /  ALT  24     /  AlkPhos  74     28 Sep 2017 07:21      Urinalysis Basic - ( 27 Sep 2017 14:27 )    Color: Yellow / Appearance: Clear / S.010 / pH: x  Gluc: x / Ketone: Negative  / Bili: Negative / Urobili: Negative mg/dL   Blood: x / Protein: Negative mg/dL / Nitrite: Negative   Leuk Esterase: Negative / RBC: x / WBC x   Sq Epi: x / Non Sq Epi: x / Bacteria: x      CAPILLARY BLOOD GLUCOSE  110 (28 Sep 2017 05:41)  82 (27 Sep 2017 22:54)  59 (27 Sep 2017 22:08)          RADIOLOGY & ADDITIONAL TESTS:    Images reviewed personally    Consultant Notes Reviewed and Care Discussed with relevant Consultants/Other Providers.

## 2017-10-02 NOTE — PROGRESS NOTE ADULT - PROVIDER SPECIALTY LIST ADULT
Endocrinology
Gastroenterology
Gastroenterology
Hospitalist
Gastroenterology
Hospitalist
Hospitalist

## 2017-10-02 NOTE — DISCHARGE NOTE ADULT - HOME CARE AGENCY
[facility] - ([phone])  Nurse to visit the day after hospital discharge. Please contact the home care agency at the above phone number if you have not heard from them by 12 noon on the day after your hospital discharge.

## 2017-10-02 NOTE — DISCHARGE NOTE ADULT - HOSPITAL COURSE
62 female with    1. sigmoid diverticulitis/diarrhea:  Stool culture, and C-diff are negative.  Patient was started on IV cipro, and Flagyl and later was switched to po cipro/flagyl.  Diarrhea resolved.  outpatient follow up for possible colonoscopy  Tolerating diet well.  afebrile  .    2. type 2 diabetes with neuropathy: with hypoglycemia. Hold metformin.  Outpatient follow up with endocrinology    3. HTN: Blood pressure meds with hold parameters     4. morbid obesity/gastric lap band history: no h/o WHIT.    5. GERD: ppi    6. gout: home meds    Hospital course and discharge planning were discussed with patient in great details.  All questions were answered.  Seems to understand, and in agreement  Left a message for PMD 62 female with    1. sigmoid diverticulitis/diarrhea:  Stool culture, and C-diff are negative.  Patient was started on IV cipro, and Flagyl and later was switched to po cipro/flagyl.  Diarrhea resolved.  outpatient follow up for possible colonoscopy  Tolerating diet well.  afebrile  .    2. type 2 diabetes with neuropathy: with hypoglycemia. Hold metformin.  Outpatient follow up with endocrinology    3. HTN: Blood pressure meds with hold parameters     4. morbid obesity/gastric lap band history: no h/o WHIT.    5. GERD: ppi    6. gout: home meds    Hospital course and discharge planning were discussed with patient in great details.  All questions were answered.  Seems to understand, and in agreement  PMD notified

## 2017-10-02 NOTE — DISCHARGE NOTE ADULT - MEDICATION SUMMARY - MEDICATIONS TO TAKE
I will START or STAY ON the medications listed below when I get home from the hospital:    metroNIDAZOLE 500 mg oral tablet  -- 1 tab(s) by mouth every 8 hours  -- Indication: For Diverticulitis    Qsymia 11.25 mg-69 mg oral capsule, extended release  -- 1 cap(s) by mouth once a day (in the morning)  -- Indication: For obesity    gabapentin 300 mg oral capsule  --  by mouth 2 times a day  -- Indication: For neuropathy    atropine-diphenoxylate  -- Indication: For ibs    allopurinol 300 mg oral tablet  -- 1 tab(s) by mouth once a day  -- Indication: For gout    atenolol 100 mg oral tablet  -- 1 tab(s) by mouth once a day  -- Indication: For Htn    amLODIPine 5 mg oral tablet  -- 1 tab(s) by mouth once a day  -- Indication: For Htn    ferrous sulfate 325 mg (65 mg elemental iron) oral tablet  -- 2  by mouth once a day  -- Indication: For anemia    lactobacillus acidophilus oral capsule  -- 1 tab(s) by mouth 3 times a day   -- Indication: For probiotic    omeprazole 40 mg oral delayed release capsule  -- 1 cap(s) by mouth once a day  -- Indication: For gerd    ciprofloxacin 500 mg oral tablet  -- 1 tab(s) by mouth every 12 hours  -- Indication: For Diverticulitis

## 2017-10-02 NOTE — DISCHARGE NOTE ADULT - ADDITIONAL INSTRUCTIONS
Please call to schedule an appointment with your doctor in 1-2 weeks  Please call to schedule an appointment with gastroenterology in 2-3 weeks  please call to schedule an appointment with endocrinology in 1-2 weeks  please come back to the hospital if you develop any abdominal pain, Nausea, vomiting, diarrhea, fever, or any new symptoms or concerns

## 2017-10-02 NOTE — PROGRESS NOTE ADULT - ASSESSMENT
62 female with    1. sigmoid diverticulitis/diarrhea: COntinue with po cipro/flagyl.  Diarrhea resolved.  Tolerating diet well.  Outpatient follow up with GI  .    2. type 2 diabetes with neuropathy: with hypoglycemia. POC has been better off IVF, and Monitor POC  endocrinology to followup    3. HTN: Blood pressure meds with hold parameters     4. morbid obesity/gastric lap band history: no h/o WHIT.    5. GERD: ppi    6. gout: home meds    7. lovenox for dvt ppx    Ok per discharge as per GI, and endocroinology
62 female with    1. sigmoid diverticulitis/diarrhea: cipro/flagyl/IVF. quantify diarrhea. GI to follow up.  Advance diet as tolerated  .    2. type 2 diabetes with neuropathy: with hypoglycemia.  Hold diabetic medications.  continue with D5 1/2 NS, and current diet  Endocrinology to follow up     3. HTN: Blood pressure meds with hold parameters     4. morbid obesity/gastric lap band history: no h/o WHIT.    5. GERD: ppi    6. gout: home meds    7. lovenox for dvt ppx    8. dispo: home when clinically improved.  plan of care d/w RN/GI.
62 year old female presents with non bloody diarrhea ct scan suggest thickened sigmoid wall ? diverticulitis vs colitis  stool cx and c diff negative thus far.   -probiotic daily upon discharge.    - finish 10 day course of abx   - regular diet   -f/u as outpt 2-4wks after d/c   -imodium 2mg po prn for diarrhea
62 female with    1. sigmoid diverticulitis/diarrhea: Will switch to po cipro/flagyl.  Diarrhea resolving.  GI to follow up.  Tolerating diet well  .    2. type 2 diabetes with neuropathy: with hypoglycemia. POC has been better.  Hold IVF, and Monitor POC  endocrinology to followup    3. HTN: Blood pressure meds with hold parameters     4. morbid obesity/gastric lap band history: no h/o WHIT.    5. GERD: ppi    6. gout: home meds    7. lovenox for dvt ppx    8. dispo: home when clinically improved and POC is stable  plan of care d/w RN/GI.
62 female with    1. sigmoid diverticulitis/diarrhea: cipro/flagyl/IVF. quantify diarrhea. GI to follow up.  consider BRAT diet.    2. type 2 diabetes with neuropathy: SSI coverage. hypoglycemia protocol. Sent message to Dr. Perlman for consult.    3. HTN: Blood pressure meds with hold parameters     4. morbid obesity/gastric lap band history: no h/o WHIT.    5. GERD: ppi    6. gout: home meds    7. lovenox for dvt ppx    8. dispo: home when clinically improved.  plan of care d/w RN/GI.
62 female with    1. sigmoid diverticulitis/diarrhea: cipro/flagyl/IVF. quantify diarrhea. case d/w Dr. Fritz.  full liquid diet.    2. type 2 diabetes with neuropathy: SSI coverage. hypoglycemia protocol.    3. HTN: Blood pressure meds with hold parameters     4. morbid obesity/gastric lap band history: no h/o WHIT.    5. GERD: ppi    6. gout: home meds    7. lovenox for dvt ppx    8. dispo: home when clinically improved.  plan of care d/w RN.
62 year old female presents with non bloody diarrhea ct scan suggest thickened sigmoid wall ? diverticulitis vs colitis  stool cx and c diff negative thus far.   -adding probiotic   - finish 10 day course of abx   - regular diet   -f/u as outpt 2 wks after d/c   -imodium 2mg po daily if diarrhea worsens.
62 year old female presents with non bloody diarrhea ct scan suggest thickened sigmoid wall ? diverticulitis vs colitis  stool cx and c diff negative thus far.   -adding probiotic   - finish 10 day course of abx   - regular diet   -f/u as outpt 2 wks after d/c   -imodium 2mg po daily if diarrhea worsens.   ambulate

## 2017-10-02 NOTE — DISCHARGE NOTE ADULT - PLAN OF CARE
Continue with current antibiotic low salt diet continue with home medications Hold metformin.  Follow up with endocrinology

## 2017-10-02 NOTE — DISCHARGE NOTE ADULT - CARE PLAN
Principal Discharge DX:	Diverticulitis  Goal:	Continue with current antibiotic  Instructions for follow-up, activity and diet:	low salt diet  Secondary Diagnosis:	Essential hypertension  Goal:	continue with home medications  Secondary Diagnosis:	Type 2 diabetes mellitus  Goal:	Hold metformin.  Follow up with endocrinology

## 2017-10-02 NOTE — DISCHARGE NOTE ADULT - MEDICATION SUMMARY - MEDICATIONS TO STOP TAKING
I will STOP taking the medications listed below when I get home from the hospital:    naproxen 500 mg oral delayed release tablet  -- 1 tab(s) by mouth 2 times a day, As Needed    metFORMIN 500 mg oral tablet  -- 1 tab(s) by mouth once a day (at bedtime)

## 2017-10-25 ENCOUNTER — APPOINTMENT (OUTPATIENT)
Dept: UROLOGY | Facility: HOSPITAL | Age: 62
End: 2017-10-25

## 2017-11-04 ENCOUNTER — EMERGENCY (EMERGENCY)
Facility: HOSPITAL | Age: 62
LOS: 1 days | Discharge: HOME | End: 2017-11-04
Attending: EMERGENCY MEDICINE | Admitting: EMERGENCY MEDICINE
Payer: COMMERCIAL

## 2017-11-04 VITALS
HEART RATE: 78 BPM | TEMPERATURE: 98 F | OXYGEN SATURATION: 99 % | HEIGHT: 62 IN | SYSTOLIC BLOOD PRESSURE: 121 MMHG | WEIGHT: 283.07 LBS | RESPIRATION RATE: 18 BRPM | DIASTOLIC BLOOD PRESSURE: 78 MMHG

## 2017-11-04 VITALS
RESPIRATION RATE: 18 BRPM | OXYGEN SATURATION: 100 % | SYSTOLIC BLOOD PRESSURE: 124 MMHG | TEMPERATURE: 98 F | HEART RATE: 72 BPM | DIASTOLIC BLOOD PRESSURE: 70 MMHG

## 2017-11-04 DIAGNOSIS — Z90.710 ACQUIRED ABSENCE OF BOTH CERVIX AND UTERUS: Chronic | ICD-10-CM

## 2017-11-04 DIAGNOSIS — Z98.89 OTHER SPECIFIED POSTPROCEDURAL STATES: Chronic | ICD-10-CM

## 2017-11-04 DIAGNOSIS — N32.81 OVERACTIVE BLADDER: Chronic | ICD-10-CM

## 2017-11-04 DIAGNOSIS — Z87.442 PERSONAL HISTORY OF URINARY CALCULI: Chronic | ICD-10-CM

## 2017-11-04 LAB
ALBUMIN SERPL ELPH-MCNC: 3.8 G/DL — SIGNIFICANT CHANGE UP (ref 3.3–5)
ALP SERPL-CCNC: 98 U/L — SIGNIFICANT CHANGE UP (ref 30–120)
ALT FLD-CCNC: 31 U/L DA — SIGNIFICANT CHANGE UP (ref 10–60)
ANION GAP SERPL CALC-SCNC: 11 MMOL/L — SIGNIFICANT CHANGE UP (ref 5–17)
AST SERPL-CCNC: 32 U/L — SIGNIFICANT CHANGE UP (ref 10–40)
BASOPHILS # BLD AUTO: 0.1 K/UL — SIGNIFICANT CHANGE UP (ref 0–0.2)
BASOPHILS NFR BLD AUTO: 1 % — SIGNIFICANT CHANGE UP (ref 0–2)
BILIRUB SERPL-MCNC: 0.4 MG/DL — SIGNIFICANT CHANGE UP (ref 0.2–1.2)
BUN SERPL-MCNC: 9 MG/DL — SIGNIFICANT CHANGE UP (ref 7–23)
CALCIUM SERPL-MCNC: 9.6 MG/DL — SIGNIFICANT CHANGE UP (ref 8.4–10.5)
CHLORIDE SERPL-SCNC: 103 MMOL/L — SIGNIFICANT CHANGE UP (ref 96–108)
CO2 SERPL-SCNC: 25 MMOL/L — SIGNIFICANT CHANGE UP (ref 22–31)
CREAT SERPL-MCNC: 0.73 MG/DL — SIGNIFICANT CHANGE UP (ref 0.5–1.3)
EOSINOPHIL # BLD AUTO: 0.2 K/UL — SIGNIFICANT CHANGE UP (ref 0–0.5)
EOSINOPHIL NFR BLD AUTO: 1.6 % — SIGNIFICANT CHANGE UP (ref 0–6)
GLUCOSE SERPL-MCNC: 93 MG/DL — SIGNIFICANT CHANGE UP (ref 70–99)
HCT VFR BLD CALC: 43.8 % — SIGNIFICANT CHANGE UP (ref 34.5–45)
HGB BLD-MCNC: 13.9 G/DL — SIGNIFICANT CHANGE UP (ref 11.5–15.5)
LIDOCAIN IGE QN: 118 U/L — SIGNIFICANT CHANGE UP (ref 73–393)
LYMPHOCYTES # BLD AUTO: 3 K/UL — SIGNIFICANT CHANGE UP (ref 1–3.3)
LYMPHOCYTES # BLD AUTO: 30 % — SIGNIFICANT CHANGE UP (ref 13–44)
MCHC RBC-ENTMCNC: 28.7 PG — SIGNIFICANT CHANGE UP (ref 27–34)
MCHC RBC-ENTMCNC: 31.6 GM/DL — LOW (ref 32–36)
MCV RBC AUTO: 90.7 FL — SIGNIFICANT CHANGE UP (ref 80–100)
MONOCYTES # BLD AUTO: 0.7 K/UL — SIGNIFICANT CHANGE UP (ref 0–0.9)
MONOCYTES NFR BLD AUTO: 6.7 % — SIGNIFICANT CHANGE UP (ref 2–14)
NEUTROPHILS # BLD AUTO: 6.2 K/UL — SIGNIFICANT CHANGE UP (ref 1.8–7.4)
NEUTROPHILS NFR BLD AUTO: 60.8 % — SIGNIFICANT CHANGE UP (ref 43–77)
PLATELET # BLD AUTO: 278 K/UL — SIGNIFICANT CHANGE UP (ref 150–400)
POTASSIUM SERPL-MCNC: 3.8 MMOL/L — SIGNIFICANT CHANGE UP (ref 3.5–5.3)
POTASSIUM SERPL-SCNC: 3.8 MMOL/L — SIGNIFICANT CHANGE UP (ref 3.5–5.3)
PROT SERPL-MCNC: 8.6 G/DL — HIGH (ref 6–8.3)
RBC # BLD: 4.83 M/UL — SIGNIFICANT CHANGE UP (ref 3.8–5.2)
RBC # FLD: 15.3 % — HIGH (ref 10.3–14.5)
SODIUM SERPL-SCNC: 139 MMOL/L — SIGNIFICANT CHANGE UP (ref 135–145)
WBC # BLD: 10.2 K/UL — SIGNIFICANT CHANGE UP (ref 3.8–10.5)
WBC # FLD AUTO: 10.2 K/UL — SIGNIFICANT CHANGE UP (ref 3.8–10.5)

## 2017-11-04 PROCEDURE — 80053 COMPREHEN METABOLIC PANEL: CPT

## 2017-11-04 PROCEDURE — 96374 THER/PROPH/DIAG INJ IV PUSH: CPT

## 2017-11-04 PROCEDURE — 99284 EMERGENCY DEPT VISIT MOD MDM: CPT | Mod: 25

## 2017-11-04 PROCEDURE — 99284 EMERGENCY DEPT VISIT MOD MDM: CPT

## 2017-11-04 PROCEDURE — 96361 HYDRATE IV INFUSION ADD-ON: CPT

## 2017-11-04 PROCEDURE — 85027 COMPLETE CBC AUTOMATED: CPT

## 2017-11-04 PROCEDURE — 83690 ASSAY OF LIPASE: CPT

## 2017-11-04 RX ORDER — SODIUM CHLORIDE 9 MG/ML
1000 INJECTION INTRAMUSCULAR; INTRAVENOUS; SUBCUTANEOUS ONCE
Qty: 0 | Refills: 0 | Status: COMPLETED | OUTPATIENT
Start: 2017-11-04 | End: 2017-11-04

## 2017-11-04 RX ORDER — PHENTERMINE AND TOPIRAMATE 3.75; 23 MG/1; MG/1
1 CAPSULE, EXTENDED RELEASE ORAL
Qty: 0 | Refills: 0 | COMMUNITY

## 2017-11-04 RX ORDER — DIPHENOXYLATE HCL/ATROPINE 2.5-.025MG
0 TABLET ORAL
Qty: 0 | Refills: 0 | COMMUNITY

## 2017-11-04 RX ORDER — AZELASTINE 137 UG/1
2 SPRAY, METERED NASAL
Qty: 0 | Refills: 0 | COMMUNITY

## 2017-11-04 RX ORDER — SODIUM CHLORIDE 9 MG/ML
3 INJECTION INTRAMUSCULAR; INTRAVENOUS; SUBCUTANEOUS ONCE
Qty: 0 | Refills: 0 | Status: COMPLETED | OUTPATIENT
Start: 2017-11-04 | End: 2017-11-04

## 2017-11-04 RX ORDER — PANTOPRAZOLE SODIUM 20 MG/1
40 TABLET, DELAYED RELEASE ORAL ONCE
Qty: 0 | Refills: 0 | Status: COMPLETED | OUTPATIENT
Start: 2017-11-04 | End: 2017-11-04

## 2017-11-04 RX ADMIN — SODIUM CHLORIDE 500 MILLILITER(S): 9 INJECTION INTRAMUSCULAR; INTRAVENOUS; SUBCUTANEOUS at 17:00

## 2017-11-04 RX ADMIN — SODIUM CHLORIDE 3 MILLILITER(S): 9 INJECTION INTRAMUSCULAR; INTRAVENOUS; SUBCUTANEOUS at 17:11

## 2017-11-04 RX ADMIN — PANTOPRAZOLE SODIUM 40 MILLIGRAM(S): 20 TABLET, DELAYED RELEASE ORAL at 17:14

## 2017-11-04 NOTE — ED PROVIDER NOTE - OBJECTIVE STATEMENT
61 y/o F presents to the ED c/o 4 episodes of watery diarrhea since last night and mild epigastric pain. Notes similar episode 1 month ago, at which time she was admitted for diverticulitis for 5 days. Notes that she came early this time because she does not want to be admitted again. Feels that she is dehydrated and requesting fluids. Hx of borderline DM, HTN. PSHx: lap band surgery few years ago, removed last march for complication. Denies fever, N/V or any other complaints at this time.

## 2017-11-04 NOTE — ED ADULT NURSE NOTE - PLAN OF CARE
Fall precautions/Bedside visitors/Position of comfort/I and O/Side rails/Explanation of exam/test/Call bell

## 2017-11-04 NOTE — ED ADULT TRIAGE NOTE - CHIEF COMPLAINT QUOTE
""My diarrhea started back up last night." Patient was hospitalized 1 month ago with diverticulitis and finished the antibiotic 3 weeks ago. States urine is dark.

## 2017-11-04 NOTE — ED PROVIDER NOTE - CONSTITUTIONAL, MLM
normal... obese female, awake, alert, oriented to person, place, time/situation and in no apparent distress.

## 2017-12-07 ENCOUNTER — APPOINTMENT (OUTPATIENT)
Dept: BARIATRICS | Facility: CLINIC | Age: 62
End: 2017-12-07
Payer: MEDICARE

## 2017-12-07 VITALS
DIASTOLIC BLOOD PRESSURE: 82 MMHG | WEIGHT: 274 LBS | BODY MASS INDEX: 46.78 KG/M2 | HEIGHT: 64 IN | OXYGEN SATURATION: 97 % | HEART RATE: 82 BPM | SYSTOLIC BLOOD PRESSURE: 132 MMHG

## 2017-12-07 PROCEDURE — 99214 OFFICE O/P EST MOD 30 MIN: CPT

## 2017-12-19 PROCEDURE — 80048 BASIC METABOLIC PNL TOTAL CA: CPT

## 2017-12-19 PROCEDURE — 81003 URINALYSIS AUTO W/O SCOPE: CPT

## 2017-12-19 PROCEDURE — 80053 COMPREHEN METABOLIC PANEL: CPT

## 2017-12-19 PROCEDURE — 74177 CT ABD & PELVIS W/CONTRAST: CPT

## 2017-12-19 PROCEDURE — 87046 STOOL CULTR AEROBIC BACT EA: CPT

## 2017-12-19 PROCEDURE — 83690 ASSAY OF LIPASE: CPT

## 2017-12-19 PROCEDURE — 85027 COMPLETE CBC AUTOMATED: CPT

## 2017-12-19 PROCEDURE — 36415 COLL VENOUS BLD VENIPUNCTURE: CPT

## 2017-12-19 PROCEDURE — 82150 ASSAY OF AMYLASE: CPT

## 2017-12-19 PROCEDURE — 96375 TX/PRO/DX INJ NEW DRUG ADDON: CPT

## 2017-12-19 PROCEDURE — 87086 URINE CULTURE/COLONY COUNT: CPT

## 2017-12-19 PROCEDURE — 87493 C DIFF AMPLIFIED PROBE: CPT

## 2017-12-19 PROCEDURE — 87045 FECES CULTURE AEROBIC BACT: CPT

## 2017-12-19 PROCEDURE — 96374 THER/PROPH/DIAG INJ IV PUSH: CPT

## 2017-12-19 PROCEDURE — 99285 EMERGENCY DEPT VISIT HI MDM: CPT | Mod: 25

## 2018-01-03 ENCOUNTER — APPOINTMENT (OUTPATIENT)
Dept: BARIATRICS/WEIGHT MGMT | Facility: CLINIC | Age: 63
End: 2018-01-03
Payer: MEDICARE

## 2018-01-03 ENCOUNTER — APPOINTMENT (OUTPATIENT)
Dept: BARIATRICS | Facility: CLINIC | Age: 63
End: 2018-01-03
Payer: MEDICARE

## 2018-01-03 VITALS
DIASTOLIC BLOOD PRESSURE: 80 MMHG | SYSTOLIC BLOOD PRESSURE: 128 MMHG | HEART RATE: 74 BPM | HEIGHT: 64 IN | OXYGEN SATURATION: 100 % | WEIGHT: 279 LBS | BODY MASS INDEX: 47.63 KG/M2

## 2018-01-03 VITALS — WEIGHT: 279 LBS | BODY MASS INDEX: 47.63 KG/M2 | HEIGHT: 64 IN

## 2018-01-03 PROCEDURE — 99213 OFFICE O/P EST LOW 20 MIN: CPT

## 2018-01-03 PROCEDURE — 97803 MED NUTRITION INDIV SUBSEQ: CPT

## 2018-01-31 ENCOUNTER — OUTPATIENT (OUTPATIENT)
Dept: OUTPATIENT SERVICES | Facility: HOSPITAL | Age: 63
LOS: 1 days | End: 2018-01-31
Payer: COMMERCIAL

## 2018-01-31 ENCOUNTER — APPOINTMENT (OUTPATIENT)
Dept: UROLOGY | Facility: CLINIC | Age: 63
End: 2018-01-31
Payer: MEDICARE

## 2018-01-31 DIAGNOSIS — N32.81 OVERACTIVE BLADDER: Chronic | ICD-10-CM

## 2018-01-31 DIAGNOSIS — Z98.89 OTHER SPECIFIED POSTPROCEDURAL STATES: Chronic | ICD-10-CM

## 2018-01-31 DIAGNOSIS — R35.0 FREQUENCY OF MICTURITION: ICD-10-CM

## 2018-01-31 DIAGNOSIS — Z90.710 ACQUIRED ABSENCE OF BOTH CERVIX AND UTERUS: Chronic | ICD-10-CM

## 2018-01-31 DIAGNOSIS — Z87.442 PERSONAL HISTORY OF URINARY CALCULI: Chronic | ICD-10-CM

## 2018-01-31 PROCEDURE — 76775 US EXAM ABDO BACK WALL LIM: CPT | Mod: 26

## 2018-01-31 PROCEDURE — 99213 OFFICE O/P EST LOW 20 MIN: CPT | Mod: 25

## 2018-01-31 PROCEDURE — 76775 US EXAM ABDO BACK WALL LIM: CPT

## 2018-02-01 DIAGNOSIS — N20.0 CALCULUS OF KIDNEY: ICD-10-CM

## 2018-02-01 DIAGNOSIS — Z87.442 PERSONAL HISTORY OF URINARY CALCULI: ICD-10-CM

## 2018-02-06 ENCOUNTER — APPOINTMENT (OUTPATIENT)
Dept: BARIATRICS/WEIGHT MGMT | Facility: CLINIC | Age: 63
End: 2018-02-06
Payer: MEDICARE

## 2018-02-06 PROCEDURE — 97803 MED NUTRITION INDIV SUBSEQ: CPT

## 2018-02-08 VITALS — HEIGHT: 64 IN | BODY MASS INDEX: 47.12 KG/M2 | WEIGHT: 276 LBS

## 2018-03-08 ENCOUNTER — APPOINTMENT (OUTPATIENT)
Dept: BARIATRICS/WEIGHT MGMT | Facility: CLINIC | Age: 63
End: 2018-03-08

## 2018-03-08 ENCOUNTER — APPOINTMENT (OUTPATIENT)
Dept: BARIATRICS | Facility: CLINIC | Age: 63
End: 2018-03-08

## 2018-03-21 NOTE — DISCHARGE NOTE ADULT - NS AS ACTIVITY OBS
Do not make important decisions/No Heavy lifting/straining/Do not drive or operate machinery (0) understands/communicates without difficulty

## 2018-05-12 NOTE — DISCHARGE NOTE ADULT - PATIENT PORTAL LINK FT
“You can access the [facility] Patient Portal, offered by [facility], by registering with the following website: [email or web address]” upper

## 2018-06-28 ENCOUNTER — APPOINTMENT (OUTPATIENT)
Dept: BARIATRICS | Facility: CLINIC | Age: 63
End: 2018-06-28
Payer: MEDICARE

## 2018-06-28 ENCOUNTER — APPOINTMENT (OUTPATIENT)
Dept: BARIATRICS/WEIGHT MGMT | Facility: CLINIC | Age: 63
End: 2018-06-28
Payer: MEDICARE

## 2018-06-28 VITALS — WEIGHT: 275.57 LBS | HEIGHT: 64 IN | BODY MASS INDEX: 47.05 KG/M2

## 2018-06-28 VITALS
HEART RATE: 76 BPM | SYSTOLIC BLOOD PRESSURE: 122 MMHG | WEIGHT: 275 LBS | DIASTOLIC BLOOD PRESSURE: 86 MMHG | HEIGHT: 64 IN | BODY MASS INDEX: 46.95 KG/M2 | OXYGEN SATURATION: 97 %

## 2018-06-28 DIAGNOSIS — M16.10 UNILATERAL PRIMARY OSTEOARTHRITIS, UNSPECIFIED HIP: ICD-10-CM

## 2018-06-28 PROCEDURE — 99213 OFFICE O/P EST LOW 20 MIN: CPT

## 2018-06-28 PROCEDURE — 97803 MED NUTRITION INDIV SUBSEQ: CPT

## 2018-06-28 RX ORDER — BLOOD SUGAR DIAGNOSTIC
STRIP MISCELLANEOUS
Qty: 100 | Refills: 0 | Status: COMPLETED | COMMUNITY
Start: 2016-05-10 | End: 2018-06-28

## 2018-06-28 RX ORDER — ECONAZOLE NITRATE 10 MG/G
1 CREAM TOPICAL
Qty: 75 | Refills: 0 | Status: COMPLETED | COMMUNITY
Start: 2017-07-09 | End: 2018-06-28

## 2018-06-28 RX ORDER — CHOLECALCIFEROL (VITAMIN D3) 10(400)/ML
DROPS ORAL
Qty: 100 | Refills: 0 | Status: COMPLETED | COMMUNITY
Start: 2016-05-10 | End: 2018-06-28

## 2018-06-28 RX ORDER — AZELASTINE HYDROCHLORIDE 137 UG/1
0.1 SPRAY, METERED NASAL
Refills: 0 | Status: COMPLETED | COMMUNITY
End: 2018-06-28

## 2018-06-28 RX ORDER — MILK THISTLE 150 MG
500 CAPSULE ORAL
Refills: 0 | Status: COMPLETED | COMMUNITY
End: 2018-06-28

## 2018-06-28 RX ORDER — METFORMIN ER 500 MG 500 MG/1
500 TABLET ORAL
Qty: 90 | Refills: 0 | Status: COMPLETED | COMMUNITY
Start: 2016-08-23 | End: 2018-06-28

## 2018-07-12 ENCOUNTER — APPOINTMENT (OUTPATIENT)
Dept: UROLOGY | Facility: CLINIC | Age: 63
End: 2018-07-12
Payer: MEDICARE

## 2018-07-12 PROCEDURE — 99214 OFFICE O/P EST MOD 30 MIN: CPT

## 2018-07-26 PROBLEM — K58.9 IRRITABLE BOWEL SYNDROME WITHOUT DIARRHEA: Chronic | Status: ACTIVE | Noted: 2017-09-13

## 2018-07-26 PROBLEM — K58.9 IRRITABLE BOWEL SYNDROME, UNSPECIFIED: Chronic | Status: ACTIVE | Noted: 2017-09-13

## 2018-07-26 PROBLEM — I10 ESSENTIAL (PRIMARY) HYPERTENSION: Chronic | Status: ACTIVE | Noted: 2017-09-13

## 2018-07-30 ENCOUNTER — OUTPATIENT (OUTPATIENT)
Dept: OUTPATIENT SERVICES | Facility: HOSPITAL | Age: 63
LOS: 1 days | End: 2018-07-30
Payer: COMMERCIAL

## 2018-07-30 ENCOUNTER — APPOINTMENT (OUTPATIENT)
Dept: UROLOGY | Facility: CLINIC | Age: 63
End: 2018-07-30
Payer: MEDICARE

## 2018-07-30 DIAGNOSIS — Z90.710 ACQUIRED ABSENCE OF BOTH CERVIX AND UTERUS: Chronic | ICD-10-CM

## 2018-07-30 DIAGNOSIS — N32.81 OVERACTIVE BLADDER: Chronic | ICD-10-CM

## 2018-07-30 DIAGNOSIS — Z98.89 OTHER SPECIFIED POSTPROCEDURAL STATES: Chronic | ICD-10-CM

## 2018-07-30 DIAGNOSIS — Z87.442 PERSONAL HISTORY OF URINARY CALCULI: Chronic | ICD-10-CM

## 2018-07-30 DIAGNOSIS — R35.0 FREQUENCY OF MICTURITION: ICD-10-CM

## 2018-07-30 PROCEDURE — 99213 OFFICE O/P EST LOW 20 MIN: CPT | Mod: 25

## 2018-07-30 PROCEDURE — 76775 US EXAM ABDO BACK WALL LIM: CPT | Mod: 26

## 2018-07-30 PROCEDURE — 76775 US EXAM ABDO BACK WALL LIM: CPT

## 2018-08-01 DIAGNOSIS — N20.0 CALCULUS OF KIDNEY: ICD-10-CM

## 2018-08-13 ENCOUNTER — OUTPATIENT (OUTPATIENT)
Dept: OUTPATIENT SERVICES | Facility: HOSPITAL | Age: 63
LOS: 1 days | End: 2018-08-13
Payer: MEDICARE

## 2018-08-13 VITALS
RESPIRATION RATE: 16 BRPM | TEMPERATURE: 98 F | WEIGHT: 272.05 LBS | SYSTOLIC BLOOD PRESSURE: 120 MMHG | OXYGEN SATURATION: 98 % | DIASTOLIC BLOOD PRESSURE: 70 MMHG | HEART RATE: 70 BPM | HEIGHT: 60 IN

## 2018-08-13 DIAGNOSIS — Z90.710 ACQUIRED ABSENCE OF BOTH CERVIX AND UTERUS: Chronic | ICD-10-CM

## 2018-08-13 DIAGNOSIS — Z98.42 CATARACT EXTRACTION STATUS, LEFT EYE: Chronic | ICD-10-CM

## 2018-08-13 DIAGNOSIS — Z87.442 PERSONAL HISTORY OF URINARY CALCULI: Chronic | ICD-10-CM

## 2018-08-13 DIAGNOSIS — N39.46 MIXED INCONTINENCE: ICD-10-CM

## 2018-08-13 DIAGNOSIS — Z98.89 OTHER SPECIFIED POSTPROCEDURAL STATES: Chronic | ICD-10-CM

## 2018-08-13 DIAGNOSIS — I10 ESSENTIAL (PRIMARY) HYPERTENSION: ICD-10-CM

## 2018-08-13 DIAGNOSIS — N32.81 OVERACTIVE BLADDER: Chronic | ICD-10-CM

## 2018-08-13 DIAGNOSIS — G47.33 OBSTRUCTIVE SLEEP APNEA (ADULT) (PEDIATRIC): ICD-10-CM

## 2018-08-13 DIAGNOSIS — E66.01 MORBID (SEVERE) OBESITY DUE TO EXCESS CALORIES: ICD-10-CM

## 2018-08-13 DIAGNOSIS — E11.9 TYPE 2 DIABETES MELLITUS WITHOUT COMPLICATIONS: ICD-10-CM

## 2018-08-13 LAB
ALBUMIN SERPL ELPH-MCNC: 4.2 G/DL — SIGNIFICANT CHANGE UP (ref 3.3–5)
ALP SERPL-CCNC: 120 U/L — SIGNIFICANT CHANGE UP (ref 40–120)
ALT FLD-CCNC: 21 U/L — SIGNIFICANT CHANGE UP (ref 4–33)
AST SERPL-CCNC: 23 U/L — SIGNIFICANT CHANGE UP (ref 4–32)
BILIRUB SERPL-MCNC: 0.3 MG/DL — SIGNIFICANT CHANGE UP (ref 0.2–1.2)
BUN SERPL-MCNC: 16 MG/DL — SIGNIFICANT CHANGE UP (ref 7–23)
CALCIUM SERPL-MCNC: 9.7 MG/DL — SIGNIFICANT CHANGE UP (ref 8.4–10.5)
CHLORIDE SERPL-SCNC: 102 MMOL/L — SIGNIFICANT CHANGE UP (ref 98–107)
CO2 SERPL-SCNC: 24 MMOL/L — SIGNIFICANT CHANGE UP (ref 22–31)
CREAT SERPL-MCNC: 0.78 MG/DL — SIGNIFICANT CHANGE UP (ref 0.5–1.3)
GLUCOSE SERPL-MCNC: 84 MG/DL — SIGNIFICANT CHANGE UP (ref 70–99)
HBA1C BLD-MCNC: 5.6 % — SIGNIFICANT CHANGE UP (ref 4–5.6)
HCT VFR BLD CALC: 43.7 % — SIGNIFICANT CHANGE UP (ref 34.5–45)
HGB BLD-MCNC: 13.9 G/DL — SIGNIFICANT CHANGE UP (ref 11.5–15.5)
MCHC RBC-ENTMCNC: 28.7 PG — SIGNIFICANT CHANGE UP (ref 27–34)
MCHC RBC-ENTMCNC: 31.8 % — LOW (ref 32–36)
MCV RBC AUTO: 90.1 FL — SIGNIFICANT CHANGE UP (ref 80–100)
NRBC # FLD: 0 — SIGNIFICANT CHANGE UP
PLATELET # BLD AUTO: 329 K/UL — SIGNIFICANT CHANGE UP (ref 150–400)
PMV BLD: 10 FL — SIGNIFICANT CHANGE UP (ref 7–13)
POTASSIUM SERPL-MCNC: 4.1 MMOL/L — SIGNIFICANT CHANGE UP (ref 3.5–5.3)
POTASSIUM SERPL-SCNC: 4.1 MMOL/L — SIGNIFICANT CHANGE UP (ref 3.5–5.3)
PROT SERPL-MCNC: 8.2 G/DL — SIGNIFICANT CHANGE UP (ref 6–8.3)
RBC # BLD: 4.85 M/UL — SIGNIFICANT CHANGE UP (ref 3.8–5.2)
RBC # FLD: 13.9 % — SIGNIFICANT CHANGE UP (ref 10.3–14.5)
SODIUM SERPL-SCNC: 139 MMOL/L — SIGNIFICANT CHANGE UP (ref 135–145)
WBC # BLD: 9.68 K/UL — SIGNIFICANT CHANGE UP (ref 3.8–10.5)
WBC # FLD AUTO: 9.68 K/UL — SIGNIFICANT CHANGE UP (ref 3.8–10.5)

## 2018-08-13 PROCEDURE — 93010 ELECTROCARDIOGRAM REPORT: CPT

## 2018-08-13 RX ORDER — FERROUS SULFATE 325(65) MG
2 TABLET ORAL
Qty: 0 | Refills: 0 | COMMUNITY

## 2018-08-13 RX ORDER — CHOLESTYRAMINE 4 G/9G
0 POWDER, FOR SUSPENSION ORAL
Qty: 0 | Refills: 0 | COMMUNITY

## 2018-08-13 RX ORDER — AZELASTINE 137 UG/1
2 SPRAY, METERED NASAL
Qty: 0 | Refills: 0 | COMMUNITY

## 2018-08-13 RX ORDER — ALBUTEROL 90 UG/1
2 AEROSOL, METERED ORAL
Qty: 0 | Refills: 0 | COMMUNITY

## 2018-08-13 RX ORDER — PHENTERMINE AND TOPIRAMATE 3.75; 23 MG/1; MG/1
1 CAPSULE, EXTENDED RELEASE ORAL
Qty: 0 | Refills: 0 | COMMUNITY

## 2018-08-13 RX ORDER — METFORMIN HYDROCHLORIDE 850 MG/1
1 TABLET ORAL
Qty: 0 | Refills: 0 | COMMUNITY

## 2018-08-13 RX ORDER — ALLOPURINOL 300 MG
1 TABLET ORAL
Qty: 0 | Refills: 0 | COMMUNITY

## 2018-08-13 NOTE — H&P PST ADULT - PSH
History of cataract surgery, left    History of Cholecystectomy- 2011/April    History of kidney stones  s/p percutaneous stone extraction 2011  Lap-Band Surgery - 2006  Removal of Lap band- 03/2017  OAB (overactive bladder)  s/p Interstim insertion-04/2016  revision 2017  S/P D&C (status post dilation and curettage)  2010 and 2014  S/P hysterectomy  2014

## 2018-08-13 NOTE — H&P PST ADULT - OTHER CARE PROVIDERS
Dr. Isaias Jones([facility])- [phone];  Dr. Zelalem Botelol (pul)[phone]

## 2018-08-13 NOTE — H&P PST ADULT - NEGATIVE GASTROINTESTINAL SYMPTOMS
no nausea/no vomiting/no change in bowel habits/no melena/no diarrhea/no constipation/no abdominal pain

## 2018-08-13 NOTE — H&P PST ADULT - PMH
Diverticulitis    Essential hypertension    Ex-Cigarette Smoker    GERD (Gastroesophageal Reflux Disease)    Gout    Irritable bowel syndrome, unspecified type    Mixed incontinence    Morbid Obesity  BMI 52  Osteoarthritis    Overactive Bladder    Pancreatitis Chronic    Sleep Apnea- mild  per pt. no treatment  Type 2 diabetes mellitus

## 2018-08-13 NOTE — H&P PST ADULT - PROBLEM SELECTOR PLAN 1
Pt is scheduled for interstim revision for 8/21/18. Pre-op instructions provided. Pt will take own omeprazole for GI prophylaxis.     Pending medical evaluation due to Hx of morbid obesity, WHIT, HTN, DM type 2. Pt states she already was evaluated by PMD. Pending last echo and stress test.     Discussed case with Dr Charles anesthesiologist. Case to be moved to main OR. Notified [place] surgical coordinator.

## 2018-08-13 NOTE — H&P PST ADULT - NEGATIVE NEUROLOGICAL SYMPTOMS
no transient paralysis/no weakness/no generalized seizures/no tremors/no syncope/no paresthesias/no focal seizures

## 2018-08-13 NOTE — H&P PST ADULT - NEGATIVE OPHTHALMOLOGIC SYMPTOMS
no photophobia/no blurred vision L/no blurred vision R/no diplopia/no pain L/no loss of vision R/no pain R/no loss of vision L

## 2018-08-13 NOTE — H&P PST ADULT - PROBLEM SELECTOR PLAN 3
reports DM type 2 managed on diet since losing weight.  FS ordered on DOS.   [facility],  OR booking notified

## 2018-08-13 NOTE — H&P PST ADULT - RS GEN PE MLT RESP DETAILS PC
airway patent/good air movement/respirations non-labored/no rales/breath sounds equal/no rhonchi/no chest wall tenderness/no wheezes/clear to auscultation bilaterally

## 2018-08-13 NOTE — H&P PST ADULT - HISTORY OF PRESENT ILLNESS
62 y/o morbidly obese female with h/o gout, HTN, cholecystitis, diverticulitis, renal colic, DM2 s/p lap band- 2006& removal in 03/2017, cholecystectomy 2011, s/p Percutaneous stone extraction - 2011, overactive bladder s/p Interstim insertion in 04/2016, and revision in 2017. Pt reports persistent urinary incontinence due to lead migration. Pt is scheduled for interstim revision for 8/21/18.

## 2018-08-13 NOTE — H&P PST ADULT - NEGATIVE ENMT SYMPTOMS
no vertigo/no sinus symptoms/no throat pain/no ear pain/no tinnitus/no nose bleeds/no hearing difficulty/no dysphagia

## 2018-08-20 ENCOUNTER — TRANSCRIPTION ENCOUNTER (OUTPATIENT)
Age: 63
End: 2018-08-20

## 2018-08-20 RX ORDER — SODIUM CHLORIDE 9 MG/ML
1000 INJECTION, SOLUTION INTRAVENOUS
Qty: 0 | Refills: 0 | Status: DISCONTINUED | OUTPATIENT
Start: 2018-08-21 | End: 2018-09-05

## 2018-08-21 ENCOUNTER — APPOINTMENT (OUTPATIENT)
Dept: UROLOGY | Facility: AMBULATORY SURGERY CENTER | Age: 63
End: 2018-08-21

## 2018-08-21 ENCOUNTER — OUTPATIENT (OUTPATIENT)
Dept: OUTPATIENT SERVICES | Facility: HOSPITAL | Age: 63
LOS: 1 days | Discharge: HOME | End: 2018-08-21
Payer: MEDICARE

## 2018-08-21 VITALS
DIASTOLIC BLOOD PRESSURE: 75 MMHG | OXYGEN SATURATION: 97 % | TEMPERATURE: 98 F | HEART RATE: 64 BPM | RESPIRATION RATE: 16 BRPM | HEIGHT: 60 IN | WEIGHT: 272.05 LBS | SYSTOLIC BLOOD PRESSURE: 127 MMHG

## 2018-08-21 VITALS
SYSTOLIC BLOOD PRESSURE: 145 MMHG | OXYGEN SATURATION: 100 % | DIASTOLIC BLOOD PRESSURE: 84 MMHG | TEMPERATURE: 97 F | HEART RATE: 68 BPM | RESPIRATION RATE: 16 BRPM

## 2018-08-21 DIAGNOSIS — Z98.89 OTHER SPECIFIED POSTPROCEDURAL STATES: Chronic | ICD-10-CM

## 2018-08-21 DIAGNOSIS — N39.46 MIXED INCONTINENCE: ICD-10-CM

## 2018-08-21 DIAGNOSIS — Z90.710 ACQUIRED ABSENCE OF BOTH CERVIX AND UTERUS: Chronic | ICD-10-CM

## 2018-08-21 DIAGNOSIS — Z87.442 PERSONAL HISTORY OF URINARY CALCULI: Chronic | ICD-10-CM

## 2018-08-21 DIAGNOSIS — N32.81 OVERACTIVE BLADDER: Chronic | ICD-10-CM

## 2018-08-21 DIAGNOSIS — Z98.42 CATARACT EXTRACTION STATUS, LEFT EYE: Chronic | ICD-10-CM

## 2018-08-21 LAB — GLUCOSE BLDC GLUCOMTR-MCNC: 95 MG/DL — SIGNIFICANT CHANGE UP (ref 70–99)

## 2018-08-21 PROCEDURE — 64590 INS/RPL PRPH SAC/GSTR NPG/R: CPT

## 2018-08-21 PROCEDURE — 76000 FLUOROSCOPY <1 HR PHYS/QHP: CPT | Mod: 26

## 2018-08-21 PROCEDURE — 95972 ALYS CPLX SP/PN NPGT W/PRGRM: CPT | Mod: 59

## 2018-08-21 PROCEDURE — 64581 OPN IMPLTJ NEA SACRAL NERVE: CPT

## 2018-08-21 RX ORDER — CEPHALEXIN 500 MG
1 CAPSULE ORAL
Qty: 6 | Refills: 0 | OUTPATIENT
Start: 2018-08-21 | End: 2018-08-23

## 2018-08-21 RX ORDER — SODIUM CHLORIDE 9 MG/ML
1000 INJECTION, SOLUTION INTRAVENOUS
Qty: 0 | Refills: 0 | Status: DISCONTINUED | OUTPATIENT
Start: 2018-08-21 | End: 2018-09-05

## 2018-08-21 RX ADMIN — SODIUM CHLORIDE 30 MILLILITER(S): 9 INJECTION, SOLUTION INTRAVENOUS at 13:15

## 2018-08-21 NOTE — ASU DISCHARGE PLAN (ADULT/PEDIATRIC). - NOTIFY
Fever greater than 101/Persistent Nausea and Vomiting/Bleeding that does not stop/Pain not relieved by Medications/Unable to Urinate

## 2018-08-21 NOTE — BRIEF OPERATIVE NOTE - PROCEDURE
<<-----Click on this checkbox to enter Procedure Insertion, neuromodulation device, sacral, permanent or temporary  08/21/2018    Active  [id number]

## 2018-08-21 NOTE — ASU DISCHARGE PLAN (ADULT/PEDIATRIC). - MEDICATION SUMMARY - MEDICATIONS TO TAKE
I will START or STAY ON the medications listed below when I get home from the hospital:    diclofenac sodium 75 mg oral delayed release tablet  -- 1 tab(s) by mouth once a day  last dose on 8/13/18  -- Indication: For Pain    oxyCODONE-acetaminophen 5 mg-325 mg oral tablet  -- 1 tab(s) by mouth every 6 hours, As Needed MDD:6  -- Caution federal law prohibits the transfer of this drug to any person other  than the person for whom it was prescribed.  May cause drowsiness.  Alcohol may intensify this effect.  Use care when operating dangerous machinery.  This prescription cannot be refilled.  This product contains acetaminophen.  Do not use  with any other product containing acetaminophen to prevent possible liver damage.  Using more of this medication than prescribed may cause serious breathing problems.    -- Indication: For Pain    Qsymia 15 mg-92 mg oral capsule, extended release  -- 1 cap(s) by mouth once a day (in the morning)  -- Indication: For Home med    gabapentin 300 mg oral tablet  -- 1 tab(s) by mouth once a day  -- Indication: For Pain    diphenoxylate-atropine 2.5 mg-0.025 mg oral tablet  -- 1 tab(s) by mouth 4 times a day, As Needed  -- Indication: For Bowel regimen    allopurinol 300 mg oral tablet  -- 1 tab(s) by mouth once a day  -- Indication: For Gout    cholestyramine 4 g/5 g oral powder for reconstitution  -- Indication: For HLD    atenolol 50 mg oral tablet  -- 1 tab(s) by mouth once a day  -- Indication: For HTN    amLODIPine 5 mg oral tablet  -- 1 tab(s) by mouth once a day  -- Indication: For HTN    cephalexin 500 mg oral tablet  -- 1 tab(s) by mouth 2 times a day   -- Finish all this medication unless otherwise directed by prescriber.    -- Indication: For Antibiotic    omeprazole 40 mg oral delayed release capsule  -- 1 cap(s) by mouth once a day  -- Indication: For GERD

## 2018-09-04 ENCOUNTER — APPOINTMENT (OUTPATIENT)
Dept: UROLOGY | Facility: CLINIC | Age: 63
End: 2018-09-04
Payer: MEDICARE

## 2018-09-04 PROCEDURE — 99213 OFFICE O/P EST LOW 20 MIN: CPT

## 2018-09-20 ENCOUNTER — APPOINTMENT (OUTPATIENT)
Dept: BARIATRICS/WEIGHT MGMT | Facility: CLINIC | Age: 63
End: 2018-09-20
Payer: MEDICARE

## 2018-09-20 VITALS — WEIGHT: 279.1 LBS | HEIGHT: 64 IN | BODY MASS INDEX: 47.65 KG/M2

## 2018-09-20 PROCEDURE — 97803 MED NUTRITION INDIV SUBSEQ: CPT

## 2018-10-09 ENCOUNTER — APPOINTMENT (OUTPATIENT)
Dept: ORTHOPEDIC SURGERY | Facility: CLINIC | Age: 63
End: 2018-10-09
Payer: MEDICARE

## 2018-10-09 VITALS
HEIGHT: 64 IN | SYSTOLIC BLOOD PRESSURE: 145 MMHG | BODY MASS INDEX: 47.46 KG/M2 | WEIGHT: 278 LBS | HEART RATE: 106 BPM | DIASTOLIC BLOOD PRESSURE: 84 MMHG

## 2018-10-09 DIAGNOSIS — M16.11 UNILATERAL PRIMARY OSTEOARTHRITIS, RIGHT HIP: ICD-10-CM

## 2018-10-09 PROCEDURE — 99204 OFFICE O/P NEW MOD 45 MIN: CPT

## 2018-10-09 PROCEDURE — 73522 X-RAY EXAM HIPS BI 3-4 VIEWS: CPT

## 2018-10-09 RX ORDER — MAGNESIUM OXIDE/MAG AA CHELATE 300 MG
CAPSULE ORAL
Refills: 0 | Status: ACTIVE | COMMUNITY

## 2018-10-09 RX ORDER — CALCIUM CARBONATE/VITAMIN D3 500-10/5ML
400 LIQUID (ML) ORAL
Refills: 0 | Status: DISCONTINUED | COMMUNITY
End: 2018-10-09

## 2018-10-09 RX ORDER — IRON/IRON ASP GLY/FA/MV-MIN 38 125-25-1MG
TABLET ORAL
Refills: 0 | Status: ACTIVE | COMMUNITY

## 2018-11-19 ENCOUNTER — OUTPATIENT (OUTPATIENT)
Dept: OUTPATIENT SERVICES | Facility: HOSPITAL | Age: 63
LOS: 1 days | End: 2018-11-19
Payer: COMMERCIAL

## 2018-11-19 VITALS
TEMPERATURE: 98 F | SYSTOLIC BLOOD PRESSURE: 120 MMHG | DIASTOLIC BLOOD PRESSURE: 80 MMHG | RESPIRATION RATE: 14 BRPM | OXYGEN SATURATION: 99 % | HEIGHT: 61 IN | WEIGHT: 266.98 LBS | HEART RATE: 67 BPM

## 2018-11-19 DIAGNOSIS — Z98.42 CATARACT EXTRACTION STATUS, LEFT EYE: Chronic | ICD-10-CM

## 2018-11-19 DIAGNOSIS — M16.11 UNILATERAL PRIMARY OSTEOARTHRITIS, RIGHT HIP: ICD-10-CM

## 2018-11-19 DIAGNOSIS — Z87.442 PERSONAL HISTORY OF URINARY CALCULI: Chronic | ICD-10-CM

## 2018-11-19 DIAGNOSIS — M19.90 UNSPECIFIED OSTEOARTHRITIS, UNSPECIFIED SITE: ICD-10-CM

## 2018-11-19 DIAGNOSIS — Z98.89 OTHER SPECIFIED POSTPROCEDURAL STATES: Chronic | ICD-10-CM

## 2018-11-19 DIAGNOSIS — N32.81 OVERACTIVE BLADDER: Chronic | ICD-10-CM

## 2018-11-19 DIAGNOSIS — Z01.818 ENCOUNTER FOR OTHER PREPROCEDURAL EXAMINATION: ICD-10-CM

## 2018-11-19 DIAGNOSIS — Z90.710 ACQUIRED ABSENCE OF BOTH CERVIX AND UTERUS: Chronic | ICD-10-CM

## 2018-11-19 LAB
ALBUMIN SERPL ELPH-MCNC: 3.9 G/DL — SIGNIFICANT CHANGE UP (ref 3.3–5)
ALP SERPL-CCNC: 128 U/L — HIGH (ref 30–120)
ALT FLD-CCNC: 28 U/L DA — SIGNIFICANT CHANGE UP (ref 10–60)
ANION GAP SERPL CALC-SCNC: 9 MMOL/L — SIGNIFICANT CHANGE UP (ref 5–17)
APTT BLD: 35.2 SEC — SIGNIFICANT CHANGE UP (ref 27.5–36.3)
AST SERPL-CCNC: 23 U/L — SIGNIFICANT CHANGE UP (ref 10–40)
BILIRUB SERPL-MCNC: 0.3 MG/DL — SIGNIFICANT CHANGE UP (ref 0.2–1.2)
BLD GP AB SCN SERPL QL: SIGNIFICANT CHANGE UP
BUN SERPL-MCNC: 19 MG/DL — SIGNIFICANT CHANGE UP (ref 7–23)
CALCIUM SERPL-MCNC: 9.9 MG/DL — SIGNIFICANT CHANGE UP (ref 8.4–10.5)
CHLORIDE SERPL-SCNC: 106 MMOL/L — SIGNIFICANT CHANGE UP (ref 96–108)
CO2 SERPL-SCNC: 27 MMOL/L — SIGNIFICANT CHANGE UP (ref 22–31)
CREAT SERPL-MCNC: 0.9 MG/DL — SIGNIFICANT CHANGE UP (ref 0.5–1.3)
GLUCOSE SERPL-MCNC: 97 MG/DL — SIGNIFICANT CHANGE UP (ref 70–99)
HCT VFR BLD CALC: 44.7 % — SIGNIFICANT CHANGE UP (ref 34.5–45)
HGB BLD-MCNC: 14.4 G/DL — SIGNIFICANT CHANGE UP (ref 11.5–15.5)
INR BLD: 1.11 RATIO — SIGNIFICANT CHANGE UP (ref 0.88–1.16)
MCHC RBC-ENTMCNC: 28.9 PG — SIGNIFICANT CHANGE UP (ref 27–34)
MCHC RBC-ENTMCNC: 32.2 GM/DL — SIGNIFICANT CHANGE UP (ref 32–36)
MCV RBC AUTO: 89.6 FL — SIGNIFICANT CHANGE UP (ref 80–100)
NRBC # BLD: 0 /100 WBCS — SIGNIFICANT CHANGE UP (ref 0–0)
PLATELET # BLD AUTO: 304 K/UL — SIGNIFICANT CHANGE UP (ref 150–400)
POTASSIUM SERPL-MCNC: 4.3 MMOL/L — SIGNIFICANT CHANGE UP (ref 3.5–5.3)
POTASSIUM SERPL-SCNC: 4.3 MMOL/L — SIGNIFICANT CHANGE UP (ref 3.5–5.3)
PROT SERPL-MCNC: 8.7 G/DL — HIGH (ref 6–8.3)
PROTHROM AB SERPL-ACNC: 12.1 SEC — SIGNIFICANT CHANGE UP (ref 10–12.9)
RBC # BLD: 4.99 M/UL — SIGNIFICANT CHANGE UP (ref 3.8–5.2)
RBC # FLD: 13.5 % — SIGNIFICANT CHANGE UP (ref 10.3–14.5)
SODIUM SERPL-SCNC: 142 MMOL/L — SIGNIFICANT CHANGE UP (ref 135–145)
WBC # BLD: 10.52 K/UL — HIGH (ref 3.8–10.5)
WBC # FLD AUTO: 10.52 K/UL — HIGH (ref 3.8–10.5)

## 2018-11-19 PROCEDURE — 87641 MR-STAPH DNA AMP PROBE: CPT

## 2018-11-19 PROCEDURE — 36415 COLL VENOUS BLD VENIPUNCTURE: CPT

## 2018-11-19 PROCEDURE — 86850 RBC ANTIBODY SCREEN: CPT

## 2018-11-19 PROCEDURE — 85730 THROMBOPLASTIN TIME PARTIAL: CPT

## 2018-11-19 PROCEDURE — 87640 STAPH A DNA AMP PROBE: CPT

## 2018-11-19 PROCEDURE — 85610 PROTHROMBIN TIME: CPT

## 2018-11-19 PROCEDURE — G0463: CPT

## 2018-11-19 PROCEDURE — 86900 BLOOD TYPING SEROLOGIC ABO: CPT

## 2018-11-19 PROCEDURE — 86901 BLOOD TYPING SEROLOGIC RH(D): CPT

## 2018-11-19 PROCEDURE — 80053 COMPREHEN METABOLIC PANEL: CPT

## 2018-11-19 PROCEDURE — 85027 COMPLETE CBC AUTOMATED: CPT

## 2018-11-19 NOTE — H&P PST ADULT - RS GEN PE MLT RESP DETAILS PC
no rales/good air movement/clear to auscultation bilaterally/breath sounds equal/respirations non-labored/no rhonchi

## 2018-11-19 NOTE — PATIENT PROFILE ADULT. - SURGICAL SITE INCISION
CAD (coronary artery disease)  S/p stent x 4 (remote)  CAD (coronary artery disease)  s/p 4 stents  CHF (congestive heart failure)  hfref  CHF, chronic  EF 40-45%  CKD (chronic kidney disease), stage 3 (moderate)    Dementia    HTN (hypertension)    HTN (hypertension)    MI (myocardial infarction)    Osteoarthritis    Osteoarthritis    Paroxysmal atrial fibrillation    Paroxysmal atrial fibrillation no

## 2018-11-19 NOTE — H&P PST ADULT - HISTORY OF PRESENT ILLNESS
64y/o morbidly obese female with h/o gout, bilateral hip OA,HTN, diverticulitis, overactive bladder s/p Interstim insertion 2016 presents with longstanding history of worsening bilateral hip pain , difficulty walking , unable to perforn ADLS . Ambulates with walker . Reports constant pain and worse pain with walking and standing with pain scale 10/10 . scheduled for right total hip replacement stage 1

## 2018-11-19 NOTE — H&P PST ADULT - FAMILY HISTORY
Mother  Still living? Unknown  Family history of diabetes mellitus, Age at diagnosis: Age Unknown     Father  Still living? Unknown  Family history of heart disease, Age at diagnosis: Age Unknown     Child  Still living? Yes, Estimated age: 51-60  Family history of heart disease, Age at diagnosis: Age Unknown

## 2018-11-19 NOTE — H&P PST ADULT - PROBLEM SELECTOR PLAN 1
Right total hip replacement stage 1  Medical , cardiac and nephrologist clearance   pre op instructions   Pharmacy consult Right total hip replacement stage 1  Medical , cardiac clearance   Urologist clearance due to Interstim implant . will turn off during procedure   Pharmacy consult

## 2018-11-19 NOTE — H&P PST ADULT - PMH
Diverticulitis    Essential hypertension    GERD (Gastroesophageal Reflux Disease)    Gout    Irritable bowel syndrome, unspecified type    Morbid Obesity  BMI 52  Osteoarthritis    Overactive Bladder    Pancreatitis Chronic  stable since 2008

## 2018-11-19 NOTE — H&P PST ADULT - PSH
History of cataract surgery, left    History of Cholecystectomy- 2011/April    History of kidney stones  s/p percutaneous stone extraction 2011  Lap-Band Surgery - 2006  Removal of Lap band- 03/2017  OAB (overactive bladder)  s/p Interstim insertion-04/2016  revision 2017, Replacement interstim implant 8/2018  S/P D&C (status post dilation and curettage)  2010 and 2014  S/P hysterectomy  2014 History of cataract surgery, left    History of Cholecystectomy- 2011/April    History of kidney stones  s/p percutaneous stone extraction 2011  Lap-Band Surgery - 2006  Removal of Lap band- 03/2017  OAB (overactive bladder)  s/p Interstim insertion-04/2016  revision 2017, Replacement Interstim implant 8/2018  S/P D&C (status post dilation and curettage)  2010 and 2014  S/P hysterectomy  2014

## 2018-11-20 PROBLEM — E11.9 TYPE 2 DIABETES MELLITUS WITHOUT COMPLICATIONS: Chronic | Status: INACTIVE | Noted: 2017-03-06 | Resolved: 2018-11-19

## 2018-11-20 PROBLEM — N39.46 MIXED INCONTINENCE: Chronic | Status: INACTIVE | Noted: 2018-08-13 | Resolved: 2018-11-19

## 2018-11-20 LAB
MRSA PCR RESULT.: SIGNIFICANT CHANGE UP
S AUREUS DNA NOSE QL NAA+PROBE: SIGNIFICANT CHANGE UP

## 2018-11-20 NOTE — BRIEF OPERATIVE NOTE - BRIEF OP NOTE DRAINS
[FreeTextEntry8] : Patient comes for an acute visit. \par \par She is here for evaluation of cough x 1 month. She has right rib and right-sided chest pain from cough. She is fatigued. She had a sore throat for 2 weeks but resolved. She has nasal congestion. No fever or chills. She is taking Dayquil and Ibuprofen with some relief. No sick contacts or recent travel. 
None

## 2018-11-23 ENCOUNTER — APPOINTMENT (OUTPATIENT)
Dept: UROLOGY | Facility: CLINIC | Age: 63
End: 2018-11-23
Payer: MEDICARE

## 2018-11-23 PROCEDURE — 99213 OFFICE O/P EST LOW 20 MIN: CPT

## 2018-11-23 RX ORDER — CHOLESTYRAMINE 4 G/9G
POWDER, FOR SUSPENSION ORAL
Refills: 0 | Status: COMPLETED | COMMUNITY
End: 2018-11-23

## 2018-11-23 RX ORDER — ALBUTEROL SULFATE 90 UG/1
108 (90 BASE) AEROSOL, METERED RESPIRATORY (INHALATION)
Qty: 18 | Refills: 0 | Status: COMPLETED | COMMUNITY
Start: 2017-01-10 | End: 2018-11-23

## 2018-11-23 RX ORDER — OMEPRAZOLE 40 MG/1
40 CAPSULE, DELAYED RELEASE ORAL
Refills: 0 | Status: ACTIVE | COMMUNITY

## 2018-11-23 RX ORDER — ALLOPURINOL 300 MG/1
300 TABLET ORAL
Refills: 0 | Status: ACTIVE | COMMUNITY

## 2018-11-29 RX ORDER — SODIUM CHLORIDE 9 MG/ML
1000 INJECTION, SOLUTION INTRAVENOUS
Qty: 0 | Refills: 0 | Status: DISCONTINUED | OUTPATIENT
Start: 2018-12-05 | End: 2018-12-07

## 2018-12-04 ENCOUNTER — APPOINTMENT (OUTPATIENT)
Dept: UROLOGY | Facility: CLINIC | Age: 63
End: 2018-12-04

## 2018-12-04 RX ORDER — PANTOPRAZOLE SODIUM 20 MG/1
40 TABLET, DELAYED RELEASE ORAL
Qty: 0 | Refills: 0 | Status: DISCONTINUED | OUTPATIENT
Start: 2018-12-05 | End: 2018-12-11

## 2018-12-04 RX ORDER — SENNA PLUS 8.6 MG/1
2 TABLET ORAL AT BEDTIME
Qty: 0 | Refills: 0 | Status: DISCONTINUED | OUTPATIENT
Start: 2018-12-05 | End: 2018-12-11

## 2018-12-04 RX ORDER — MAGNESIUM HYDROXIDE 400 MG/1
30 TABLET, CHEWABLE ORAL DAILY
Qty: 0 | Refills: 0 | Status: DISCONTINUED | OUTPATIENT
Start: 2018-12-05 | End: 2018-12-11

## 2018-12-04 RX ORDER — SODIUM CHLORIDE 9 MG/ML
1000 INJECTION, SOLUTION INTRAVENOUS
Qty: 0 | Refills: 0 | Status: DISCONTINUED | OUTPATIENT
Start: 2018-12-05 | End: 2018-12-07

## 2018-12-04 RX ORDER — POLYETHYLENE GLYCOL 3350 17 G/17G
17 POWDER, FOR SOLUTION ORAL DAILY
Qty: 0 | Refills: 0 | Status: DISCONTINUED | OUTPATIENT
Start: 2018-12-05 | End: 2018-12-11

## 2018-12-04 RX ORDER — ONDANSETRON 8 MG/1
4 TABLET, FILM COATED ORAL EVERY 6 HOURS
Qty: 0 | Refills: 0 | Status: DISCONTINUED | OUTPATIENT
Start: 2018-12-05 | End: 2018-12-11

## 2018-12-04 RX ORDER — DOCUSATE SODIUM 100 MG
100 CAPSULE ORAL THREE TIMES A DAY
Qty: 0 | Refills: 0 | Status: DISCONTINUED | OUTPATIENT
Start: 2018-12-05 | End: 2018-12-11

## 2018-12-05 ENCOUNTER — APPOINTMENT (OUTPATIENT)
Dept: ORTHOPEDIC SURGERY | Facility: HOSPITAL | Age: 63
End: 2018-12-05

## 2018-12-05 ENCOUNTER — RESULT REVIEW (OUTPATIENT)
Age: 63
End: 2018-12-05

## 2018-12-05 ENCOUNTER — INPATIENT (INPATIENT)
Facility: HOSPITAL | Age: 63
LOS: 5 days | Discharge: NURSING, REHAB OR LONG-TERM CARE | DRG: 470 | End: 2018-12-11
Attending: ORTHOPAEDIC SURGERY | Admitting: ORTHOPAEDIC SURGERY
Payer: COMMERCIAL

## 2018-12-05 VITALS
DIASTOLIC BLOOD PRESSURE: 74 MMHG | OXYGEN SATURATION: 100 % | WEIGHT: 277.78 LBS | HEIGHT: 62 IN | RESPIRATION RATE: 14 BRPM | TEMPERATURE: 98 F | SYSTOLIC BLOOD PRESSURE: 115 MMHG | HEART RATE: 63 BPM

## 2018-12-05 DIAGNOSIS — Z01.818 ENCOUNTER FOR OTHER PREPROCEDURAL EXAMINATION: ICD-10-CM

## 2018-12-05 DIAGNOSIS — Z98.89 OTHER SPECIFIED POSTPROCEDURAL STATES: Chronic | ICD-10-CM

## 2018-12-05 DIAGNOSIS — M16.11 UNILATERAL PRIMARY OSTEOARTHRITIS, RIGHT HIP: ICD-10-CM

## 2018-12-05 DIAGNOSIS — Z98.42 CATARACT EXTRACTION STATUS, LEFT EYE: Chronic | ICD-10-CM

## 2018-12-05 DIAGNOSIS — Z87.442 PERSONAL HISTORY OF URINARY CALCULI: Chronic | ICD-10-CM

## 2018-12-05 DIAGNOSIS — N32.81 OVERACTIVE BLADDER: Chronic | ICD-10-CM

## 2018-12-05 DIAGNOSIS — Z90.710 ACQUIRED ABSENCE OF BOTH CERVIX AND UTERUS: Chronic | ICD-10-CM

## 2018-12-05 LAB
ANION GAP SERPL CALC-SCNC: 8 MMOL/L — SIGNIFICANT CHANGE UP (ref 5–17)
BUN SERPL-MCNC: 13 MG/DL — SIGNIFICANT CHANGE UP (ref 7–23)
CALCIUM SERPL-MCNC: 8.8 MG/DL — SIGNIFICANT CHANGE UP (ref 8.4–10.5)
CHLORIDE SERPL-SCNC: 102 MMOL/L — SIGNIFICANT CHANGE UP (ref 96–108)
CO2 SERPL-SCNC: 28 MMOL/L — SIGNIFICANT CHANGE UP (ref 22–31)
CREAT SERPL-MCNC: 0.8 MG/DL — SIGNIFICANT CHANGE UP (ref 0.5–1.3)
GLUCOSE BLDC GLUCOMTR-MCNC: 88 MG/DL — SIGNIFICANT CHANGE UP (ref 70–99)
GLUCOSE SERPL-MCNC: 131 MG/DL — HIGH (ref 70–99)
HCT VFR BLD CALC: 37.8 % — SIGNIFICANT CHANGE UP (ref 34.5–45)
HGB BLD-MCNC: 12.2 G/DL — SIGNIFICANT CHANGE UP (ref 11.5–15.5)
POTASSIUM SERPL-MCNC: 3.6 MMOL/L — SIGNIFICANT CHANGE UP (ref 3.5–5.3)
POTASSIUM SERPL-SCNC: 3.6 MMOL/L — SIGNIFICANT CHANGE UP (ref 3.5–5.3)
SODIUM SERPL-SCNC: 138 MMOL/L — SIGNIFICANT CHANGE UP (ref 135–145)
WBC # BLD: 16.34 K/UL — HIGH (ref 3.8–10.5)
WBC # FLD AUTO: 16.34 K/UL — HIGH (ref 3.8–10.5)

## 2018-12-05 PROCEDURE — 88305 TISSUE EXAM BY PATHOLOGIST: CPT | Mod: 26

## 2018-12-05 PROCEDURE — 73502 X-RAY EXAM HIP UNI 2-3 VIEWS: CPT | Mod: 26

## 2018-12-05 PROCEDURE — 88311 DECALCIFY TISSUE: CPT | Mod: 26

## 2018-12-05 PROCEDURE — 27130 TOTAL HIP ARTHROPLASTY: CPT | Mod: 82,RT

## 2018-12-05 PROCEDURE — 99223 1ST HOSP IP/OBS HIGH 75: CPT

## 2018-12-05 PROCEDURE — 27130 TOTAL HIP ARTHROPLASTY: CPT | Mod: RT

## 2018-12-05 RX ORDER — CHLORHEXIDINE GLUCONATE 213 G/1000ML
1 SOLUTION TOPICAL ONCE
Qty: 0 | Refills: 0 | Status: COMPLETED | OUTPATIENT
Start: 2018-12-05 | End: 2018-12-05

## 2018-12-05 RX ORDER — OXYCODONE HYDROCHLORIDE 5 MG/1
5 TABLET ORAL
Qty: 0 | Refills: 0 | Status: DISCONTINUED | OUTPATIENT
Start: 2018-12-05 | End: 2018-12-06

## 2018-12-05 RX ORDER — OXYCODONE HYDROCHLORIDE 5 MG/1
10 TABLET ORAL
Qty: 0 | Refills: 0 | Status: DISCONTINUED | OUTPATIENT
Start: 2018-12-05 | End: 2018-12-06

## 2018-12-05 RX ORDER — ONDANSETRON 8 MG/1
4 TABLET, FILM COATED ORAL ONCE
Qty: 0 | Refills: 0 | Status: COMPLETED | OUTPATIENT
Start: 2018-12-05 | End: 2018-12-05

## 2018-12-05 RX ORDER — CEFAZOLIN SODIUM 1 G
3000 VIAL (EA) INJECTION EVERY 8 HOURS
Qty: 0 | Refills: 0 | Status: COMPLETED | OUTPATIENT
Start: 2018-12-05 | End: 2018-12-05

## 2018-12-05 RX ORDER — ENOXAPARIN SODIUM 100 MG/ML
40 INJECTION SUBCUTANEOUS EVERY 12 HOURS
Qty: 0 | Refills: 0 | Status: COMPLETED | OUTPATIENT
Start: 2018-12-06 | End: 2018-12-09

## 2018-12-05 RX ORDER — TRANEXAMIC ACID 100 MG/ML
1000 INJECTION, SOLUTION INTRAVENOUS ONCE
Qty: 0 | Refills: 0 | Status: COMPLETED | OUTPATIENT
Start: 2018-12-05 | End: 2018-12-05

## 2018-12-05 RX ORDER — ACETAMINOPHEN 500 MG
1000 TABLET ORAL ONCE
Qty: 0 | Refills: 0 | Status: COMPLETED | OUTPATIENT
Start: 2018-12-05 | End: 2018-12-05

## 2018-12-05 RX ORDER — CEFAZOLIN SODIUM 1 G
3000 VIAL (EA) INJECTION ONCE
Qty: 0 | Refills: 0 | Status: COMPLETED | OUTPATIENT
Start: 2018-12-05 | End: 2018-12-05

## 2018-12-05 RX ORDER — HYDROMORPHONE HYDROCHLORIDE 2 MG/ML
0.5 INJECTION INTRAMUSCULAR; INTRAVENOUS; SUBCUTANEOUS
Qty: 0 | Refills: 0 | Status: DISCONTINUED | OUTPATIENT
Start: 2018-12-05 | End: 2018-12-11

## 2018-12-05 RX ORDER — ACETAMINOPHEN 500 MG
1000 TABLET ORAL EVERY 6 HOURS
Qty: 0 | Refills: 0 | Status: COMPLETED | OUTPATIENT
Start: 2018-12-05 | End: 2018-12-06

## 2018-12-05 RX ORDER — ATENOLOL 25 MG/1
50 TABLET ORAL DAILY
Qty: 0 | Refills: 0 | Status: DISCONTINUED | OUTPATIENT
Start: 2018-12-05 | End: 2018-12-11

## 2018-12-05 RX ORDER — GABAPENTIN 400 MG/1
300 CAPSULE ORAL DAILY
Qty: 0 | Refills: 0 | Status: DISCONTINUED | OUTPATIENT
Start: 2018-12-05 | End: 2018-12-11

## 2018-12-05 RX ORDER — ACETAMINOPHEN 500 MG
1000 TABLET ORAL EVERY 8 HOURS
Qty: 0 | Refills: 0 | Status: DISCONTINUED | OUTPATIENT
Start: 2018-12-06 | End: 2018-12-11

## 2018-12-05 RX ORDER — HYDROMORPHONE HYDROCHLORIDE 2 MG/ML
0.5 INJECTION INTRAMUSCULAR; INTRAVENOUS; SUBCUTANEOUS
Qty: 0 | Refills: 0 | Status: DISCONTINUED | OUTPATIENT
Start: 2018-12-05 | End: 2018-12-05

## 2018-12-05 RX ORDER — CHOLESTYRAMINE 4 G/9G
4 POWDER, FOR SUSPENSION ORAL DAILY
Qty: 0 | Refills: 0 | Status: DISCONTINUED | OUTPATIENT
Start: 2018-12-05 | End: 2018-12-11

## 2018-12-05 RX ORDER — AMLODIPINE BESYLATE 2.5 MG/1
5 TABLET ORAL DAILY
Qty: 0 | Refills: 0 | Status: DISCONTINUED | OUTPATIENT
Start: 2018-12-05 | End: 2018-12-07

## 2018-12-05 RX ORDER — APREPITANT 80 MG/1
40 CAPSULE ORAL ONCE
Qty: 0 | Refills: 0 | Status: COMPLETED | OUTPATIENT
Start: 2018-12-05 | End: 2018-12-05

## 2018-12-05 RX ORDER — SODIUM CHLORIDE 9 MG/ML
1000 INJECTION, SOLUTION INTRAVENOUS
Qty: 0 | Refills: 0 | Status: DISCONTINUED | OUTPATIENT
Start: 2018-12-05 | End: 2018-12-05

## 2018-12-05 RX ORDER — ALLOPURINOL 300 MG
300 TABLET ORAL DAILY
Qty: 0 | Refills: 0 | Status: DISCONTINUED | OUTPATIENT
Start: 2018-12-05 | End: 2018-12-11

## 2018-12-05 RX ORDER — DIPHENOXYLATE HCL/ATROPINE 2.5-.025MG
1 TABLET ORAL
Qty: 0 | Refills: 0 | Status: DISCONTINUED | OUTPATIENT
Start: 2018-12-05 | End: 2018-12-11

## 2018-12-05 RX ADMIN — ONDANSETRON 4 MILLIGRAM(S): 8 TABLET, FILM COATED ORAL at 15:30

## 2018-12-05 RX ADMIN — Medication 400 MILLIGRAM(S): at 18:35

## 2018-12-05 RX ADMIN — CHLORHEXIDINE GLUCONATE 1 APPLICATION(S): 213 SOLUTION TOPICAL at 06:44

## 2018-12-05 RX ADMIN — HYDROMORPHONE HYDROCHLORIDE 0.5 MILLIGRAM(S): 2 INJECTION INTRAMUSCULAR; INTRAVENOUS; SUBCUTANEOUS at 11:30

## 2018-12-05 RX ADMIN — HYDROMORPHONE HYDROCHLORIDE 0.5 MILLIGRAM(S): 2 INJECTION INTRAMUSCULAR; INTRAVENOUS; SUBCUTANEOUS at 22:11

## 2018-12-05 RX ADMIN — SENNA PLUS 2 TABLET(S): 8.6 TABLET ORAL at 21:51

## 2018-12-05 RX ADMIN — HYDROMORPHONE HYDROCHLORIDE 0.5 MILLIGRAM(S): 2 INJECTION INTRAMUSCULAR; INTRAVENOUS; SUBCUTANEOUS at 23:00

## 2018-12-05 RX ADMIN — Medication 200 MILLIGRAM(S): at 22:13

## 2018-12-05 RX ADMIN — Medication 400 MILLIGRAM(S): at 13:30

## 2018-12-05 RX ADMIN — Medication 200 MILLIGRAM(S): at 15:00

## 2018-12-05 RX ADMIN — Medication 100 MILLIGRAM(S): at 21:51

## 2018-12-05 RX ADMIN — Medication 1000 MILLIGRAM(S): at 14:00

## 2018-12-05 RX ADMIN — HYDROMORPHONE HYDROCHLORIDE 0.5 MILLIGRAM(S): 2 INJECTION INTRAMUSCULAR; INTRAVENOUS; SUBCUTANEOUS at 12:58

## 2018-12-05 RX ADMIN — APREPITANT 40 MILLIGRAM(S): 80 CAPSULE ORAL at 06:43

## 2018-12-05 RX ADMIN — HYDROMORPHONE HYDROCHLORIDE 0.5 MILLIGRAM(S): 2 INJECTION INTRAMUSCULAR; INTRAVENOUS; SUBCUTANEOUS at 13:01

## 2018-12-05 RX ADMIN — SODIUM CHLORIDE 75 MILLILITER(S): 9 INJECTION, SOLUTION INTRAVENOUS at 10:57

## 2018-12-05 RX ADMIN — HYDROMORPHONE HYDROCHLORIDE 0.5 MILLIGRAM(S): 2 INJECTION INTRAMUSCULAR; INTRAVENOUS; SUBCUTANEOUS at 13:30

## 2018-12-05 RX ADMIN — Medication 1000 MILLIGRAM(S): at 19:30

## 2018-12-05 NOTE — BRIEF OPERATIVE NOTE - PROCEDURE
<<-----Click on this checkbox to enter Procedure Hip replacement  12/05/2018  Right  Active  Casey Mercer

## 2018-12-05 NOTE — PROGRESS NOTE ADULT - SUBJECTIVE AND OBJECTIVE BOX
Orthopaedic Post Op Note    Procedure: Right THR  Surgeon: Gary Lowe    63y Female comfortable, without complaints. Reported pain score =  4  Denies N/V, CP, SOB, numbness/tingling of extremities.    PE:  Vital Signs Last 24 Hrs  T(C): 36.3 (05 Dec 2018 09:45), Max: 36.4 (05 Dec 2018 06:14)  T(F): 97.4 (05 Dec 2018 09:45), Max: 97.5 (05 Dec 2018 06:14)  HR: 60 (05 Dec 2018 16:15) (54 - 63)  BP: 130/68 (05 Dec 2018 16:15) (85/53 - 135/78)  RR: 16 (05 Dec 2018 16:15) (12 - 20)  SpO2: 100% (05 Dec 2018 16:15) (97% - 100%)  General: Pt alert and oriented   Lungs: + BS CTA bilaterally  Heart: +S1 & S2 heard, RRR  Abd: + BS heard, soft, NT, ND  Right Hip Dressing: C/D/I   Abd pillow in place  Bilateral LEs:  Motor:   5/5 dorsiflexion, plantarflexion, EHL  Sensation intact to LT   2+ DP Pulses  SCDs in place    A/P: 63y Female stable POD#0 s/p Right THR   -  Acetaminophen, Celebrex, Dilaudid/Oxycodone for Pain Control   - DVT ppx: Lovenox 40 q 12h  - Nu op IV abx: Ancef  - total hip precautions  - PT, OT per protocol  - F/U AM Labs  Patient is staged for Left THR 12/10/18

## 2018-12-05 NOTE — CONSULT NOTE ADULT - ASSESSMENT
POD#0 s/p Stage 1 Right THR  -       HTN  -     OAB  -     Gout  -     Morbid Obesity  -     Reason for gabapentin use POD#0 s/p Stage 1 Right THR  - Pain control  - Bowel regimen  - Hives with Celebrex, but tolerates diclofenac, ibuprofen, and mobic  - VTE PPx - Lovenox  - Stage 2 planned for Monday    HTN  - continue atenolol and amlodipine with hold parameters     Gout  - continue allopurinol    Morbid Obesity  - weight has been stable over past year  - had PSG last year, showed mild WHIT, CPAP was not prescribed per patient  - monitor on tele x 24 hours  - nocturnal oxygen    Reason for gabapentin use  - for neuropathy s/p dental procedure (numbness on lower part of face)

## 2018-12-06 LAB
ANION GAP SERPL CALC-SCNC: 11 MMOL/L — SIGNIFICANT CHANGE UP (ref 5–17)
BUN SERPL-MCNC: 8 MG/DL — SIGNIFICANT CHANGE UP (ref 7–23)
CALCIUM SERPL-MCNC: 9.2 MG/DL — SIGNIFICANT CHANGE UP (ref 8.4–10.5)
CHLORIDE SERPL-SCNC: 102 MMOL/L — SIGNIFICANT CHANGE UP (ref 96–108)
CO2 SERPL-SCNC: 22 MMOL/L — SIGNIFICANT CHANGE UP (ref 22–31)
CREAT SERPL-MCNC: 0.68 MG/DL — SIGNIFICANT CHANGE UP (ref 0.5–1.3)
GLUCOSE SERPL-MCNC: 107 MG/DL — HIGH (ref 70–99)
HCT VFR BLD CALC: 39.4 % — SIGNIFICANT CHANGE UP (ref 34.5–45)
HGB BLD-MCNC: 12.7 G/DL — SIGNIFICANT CHANGE UP (ref 11.5–15.5)
MCHC RBC-ENTMCNC: 29.1 PG — SIGNIFICANT CHANGE UP (ref 27–34)
MCHC RBC-ENTMCNC: 32.2 GM/DL — SIGNIFICANT CHANGE UP (ref 32–36)
MCV RBC AUTO: 90.4 FL — SIGNIFICANT CHANGE UP (ref 80–100)
NRBC # BLD: 0 /100 WBCS — SIGNIFICANT CHANGE UP (ref 0–0)
PLATELET # BLD AUTO: 218 K/UL — SIGNIFICANT CHANGE UP (ref 150–400)
POTASSIUM SERPL-MCNC: 4.6 MMOL/L — SIGNIFICANT CHANGE UP (ref 3.5–5.3)
POTASSIUM SERPL-SCNC: 4.6 MMOL/L — SIGNIFICANT CHANGE UP (ref 3.5–5.3)
RBC # BLD: 4.36 M/UL — SIGNIFICANT CHANGE UP (ref 3.8–5.2)
RBC # FLD: 13.3 % — SIGNIFICANT CHANGE UP (ref 10.3–14.5)
SODIUM SERPL-SCNC: 135 MMOL/L — SIGNIFICANT CHANGE UP (ref 135–145)
WBC # BLD: 12.01 K/UL — HIGH (ref 3.8–10.5)
WBC # FLD AUTO: 12.01 K/UL — HIGH (ref 3.8–10.5)

## 2018-12-06 PROCEDURE — 93970 EXTREMITY STUDY: CPT | Mod: 26

## 2018-12-06 PROCEDURE — 99233 SBSQ HOSP IP/OBS HIGH 50: CPT

## 2018-12-06 RX ORDER — MELOXICAM 15 MG/1
15 TABLET ORAL ONCE
Qty: 0 | Refills: 0 | Status: COMPLETED | OUTPATIENT
Start: 2018-12-06 | End: 2018-12-06

## 2018-12-06 RX ORDER — HYDROMORPHONE HYDROCHLORIDE 2 MG/ML
4 INJECTION INTRAMUSCULAR; INTRAVENOUS; SUBCUTANEOUS
Qty: 0 | Refills: 0 | Status: DISCONTINUED | OUTPATIENT
Start: 2018-12-06 | End: 2018-12-06

## 2018-12-06 RX ORDER — HYDROMORPHONE HYDROCHLORIDE 2 MG/ML
6 INJECTION INTRAMUSCULAR; INTRAVENOUS; SUBCUTANEOUS
Qty: 0 | Refills: 0 | Status: DISCONTINUED | OUTPATIENT
Start: 2018-12-06 | End: 2018-12-07

## 2018-12-06 RX ORDER — HYDROMORPHONE HYDROCHLORIDE 2 MG/ML
4 INJECTION INTRAMUSCULAR; INTRAVENOUS; SUBCUTANEOUS
Qty: 0 | Refills: 0 | Status: DISCONTINUED | OUTPATIENT
Start: 2018-12-06 | End: 2018-12-07

## 2018-12-06 RX ORDER — MELOXICAM 15 MG/1
15 TABLET ORAL DAILY
Qty: 0 | Refills: 0 | Status: COMPLETED | OUTPATIENT
Start: 2018-12-07 | End: 2018-12-08

## 2018-12-06 RX ORDER — HYDROMORPHONE HYDROCHLORIDE 2 MG/ML
2 INJECTION INTRAMUSCULAR; INTRAVENOUS; SUBCUTANEOUS
Qty: 0 | Refills: 0 | Status: DISCONTINUED | OUTPATIENT
Start: 2018-12-06 | End: 2018-12-06

## 2018-12-06 RX ADMIN — Medication 1000 MILLIGRAM(S): at 16:04

## 2018-12-06 RX ADMIN — MELOXICAM 15 MILLIGRAM(S): 15 TABLET ORAL at 17:55

## 2018-12-06 RX ADMIN — GABAPENTIN 300 MILLIGRAM(S): 400 CAPSULE ORAL at 11:40

## 2018-12-06 RX ADMIN — AMLODIPINE BESYLATE 5 MILLIGRAM(S): 2.5 TABLET ORAL at 05:38

## 2018-12-06 RX ADMIN — Medication 400 MILLIGRAM(S): at 01:10

## 2018-12-06 RX ADMIN — OXYCODONE HYDROCHLORIDE 10 MILLIGRAM(S): 5 TABLET ORAL at 09:28

## 2018-12-06 RX ADMIN — HYDROMORPHONE HYDROCHLORIDE 4 MILLIGRAM(S): 2 INJECTION INTRAMUSCULAR; INTRAVENOUS; SUBCUTANEOUS at 12:30

## 2018-12-06 RX ADMIN — PANTOPRAZOLE SODIUM 40 MILLIGRAM(S): 20 TABLET, DELAYED RELEASE ORAL at 05:38

## 2018-12-06 RX ADMIN — MELOXICAM 15 MILLIGRAM(S): 15 TABLET ORAL at 17:27

## 2018-12-06 RX ADMIN — Medication 1000 MILLIGRAM(S): at 15:32

## 2018-12-06 RX ADMIN — HYDROMORPHONE HYDROCHLORIDE 0.5 MILLIGRAM(S): 2 INJECTION INTRAMUSCULAR; INTRAVENOUS; SUBCUTANEOUS at 13:40

## 2018-12-06 RX ADMIN — HYDROMORPHONE HYDROCHLORIDE 0.5 MILLIGRAM(S): 2 INJECTION INTRAMUSCULAR; INTRAVENOUS; SUBCUTANEOUS at 13:19

## 2018-12-06 RX ADMIN — OXYCODONE HYDROCHLORIDE 10 MILLIGRAM(S): 5 TABLET ORAL at 06:40

## 2018-12-06 RX ADMIN — Medication 100 MILLIGRAM(S): at 13:19

## 2018-12-06 RX ADMIN — ATENOLOL 50 MILLIGRAM(S): 25 TABLET ORAL at 05:38

## 2018-12-06 RX ADMIN — ENOXAPARIN SODIUM 40 MILLIGRAM(S): 100 INJECTION SUBCUTANEOUS at 09:28

## 2018-12-06 RX ADMIN — Medication 300 MILLIGRAM(S): at 11:40

## 2018-12-06 RX ADMIN — OXYCODONE HYDROCHLORIDE 10 MILLIGRAM(S): 5 TABLET ORAL at 10:20

## 2018-12-06 RX ADMIN — HYDROMORPHONE HYDROCHLORIDE 4 MILLIGRAM(S): 2 INJECTION INTRAMUSCULAR; INTRAVENOUS; SUBCUTANEOUS at 11:40

## 2018-12-06 RX ADMIN — HYDROMORPHONE HYDROCHLORIDE 6 MILLIGRAM(S): 2 INJECTION INTRAMUSCULAR; INTRAVENOUS; SUBCUTANEOUS at 22:21

## 2018-12-06 RX ADMIN — Medication 100 MILLIGRAM(S): at 05:38

## 2018-12-06 RX ADMIN — HYDROMORPHONE HYDROCHLORIDE 6 MILLIGRAM(S): 2 INJECTION INTRAMUSCULAR; INTRAVENOUS; SUBCUTANEOUS at 23:00

## 2018-12-06 RX ADMIN — Medication 1000 MILLIGRAM(S): at 01:14

## 2018-12-06 RX ADMIN — OXYCODONE HYDROCHLORIDE 10 MILLIGRAM(S): 5 TABLET ORAL at 05:41

## 2018-12-06 RX ADMIN — ENOXAPARIN SODIUM 40 MILLIGRAM(S): 100 INJECTION SUBCUTANEOUS at 22:15

## 2018-12-06 RX ADMIN — Medication 100 MILLIGRAM(S): at 22:15

## 2018-12-06 RX ADMIN — SENNA PLUS 2 TABLET(S): 8.6 TABLET ORAL at 22:15

## 2018-12-06 NOTE — PROGRESS NOTE ADULT - ASSESSMENT
POD#1 s/p Stage 1 Right THR  - Pain control  - Bowel regimen  - Hives with Celebrex, but tolerates diclofenac, ibuprofen, and mobic  - VTE PPx - Lovenox  - Stage 2 planned for Monday    Right Calf Pain  - likely related to proximal surgical site  - will get dopplers today as she would get them tomorrow anyway for prior to Stage 2    HTN  - continue atenolol and amlodipine with hold parameters     Gout  - continue allopurinol    Morbid Obesity  - weight has been stable over past year  - had PSG last year, showed mild WHIT, CPAP was not prescribed per patient  - monitor on tele x 24 hours  - nocturnal oxygen    Reason for gabapentin use  - for neuropathy s/p dental procedure (numbness on lower part of face) POD#1 s/p Stage 1 Right THR  - Pain control  - Bowel regimen  - Hives with Celebrex, but tolerates diclofenac, ibuprofen, and mobic  - VTE PPx - Lovenox  - Stage 2 planned for Monday    Right Calf Pain  - will get dopplers today as she would get them tomorrow anyway for prior to Stage 2    HTN  - continue atenolol and amlodipine with hold parameters     Gout  - continue allopurinol    Morbid Obesity  - weight has been stable over past year  - had PSG last year, showed mild WHIT, CPAP was not prescribed per patient  - monitor on tele x 24 hours  - nocturnal oxygen    Reason for gabapentin use  - for neuropathy s/p dental procedure (numbness on lower part of face)

## 2018-12-06 NOTE — PROGRESS NOTE ADULT - SUBJECTIVE AND OBJECTIVE BOX
Post Op Day # 1    SUBJECTIVE    62yo Female status post Right THR (stage 1).   Patient is alert and comfortable.    Pain is controlled with current pain regimen.  Denies nausea, vomiting, chest pain, shortness of breath, abdominal pain or fever.   No new complaints.    OBJECTIVE    Vital Signs Last 24 Hrs  T(C): 36.6 (06 Dec 2018 08:20), Max: 37.1 (06 Dec 2018 03:19)  T(F): 97.9 (06 Dec 2018 08:20), Max: 98.7 (06 Dec 2018 03:19)  HR: 79 (06 Dec 2018 08:20) (54 - 80)  BP: 99/56 (06 Dec 2018 08:20) (96/56 - 136/77)  BP(mean): --  RR: 18 (06 Dec 2018 08:20) (12 - 20)  SpO2: 98% (06 Dec 2018 08:20) (94% - 100%)  I&O's Summary    05 Dec 2018 07:01  -  06 Dec 2018 07:00  --------------------------------------------------------  IN: 3490 mL / OUT: 2600 mL / NET: 890 mL        PHYSICAL EXAM    Right Hip dressing  is clean, dry and intact.   The calf is supple/nontender.   Passive range of motion is acceptable to due postoperative pain.   Sensation to light touch is grossly intact distally.   The lateral cutaneous nerve is intact.   Motor function distally is intact.   No foot drop.   (2+) dorsalis pedis pulse. Capillary refill is less than 2 seconds. No cyanosis.                          12.7   12.01<H> )-----------( 218      ( 06 Dec 2018 08:35 )             39.4   06 Dec 2018 08:35                        12.2   x     )-----------( x        ( 05 Dec 2018 20:46 )             37.8   05 Dec 2018 20:46    06 Dec 2018 08:35    135    |  102    |  8      ----------------------------<  107<H>  4.6     |  22     |  0.68   05 Dec 2018 18:06    138    |  102    |  13     ----------------------------<  131<H>  3.6     |  28     |  0.80     Ca    9.2        06 Dec 2018 08:35  Ca    8.8        05 Dec 2018 18:06        ASSESSMENT AND PLAN    - Orthopedically stable  - DVT prophylaxis: PAS + Lovenox  -  HO prophylaxis: Naproxen 250mg PO three times daily x21 days  - Continue physical therapy and occupational therapy  - Weight bearing as tolerated of the right lower extremity with assistance of a walker  - Incentive spirometry encouraged  - Pain control as clinically indicated  - Scheduled for left THR on 12/10/18  - Disposition: subacute rehabilitation after stage 2

## 2018-12-06 NOTE — OCCUPATIONAL THERAPY INITIAL EVALUATION ADULT - ADDITIONAL COMMENTS
Lives with her children. 3 steps to enter. None inside stall shower. Has a RW, comfort height toilet and a SC.

## 2018-12-06 NOTE — PROGRESS NOTE ADULT - SUBJECTIVE AND OBJECTIVE BOX
INTERVAL HPI/OVERNIGHT EVENTS:   Patient seen and examined.  Eating, voiding, no BM yet.  c/o right calf pain.  No fevers, chills, sweats, dizziness, HA, changes in vision, cp, palpitations, sob, persistent cough, n/v/d, abd pain, dysuria, focal weakness.    MEDICATIONS  (STANDING):  acetaminophen   Tablet .. 1000 milliGRAM(s) Oral every 8 hours  allopurinol 300 milliGRAM(s) Oral daily  amLODIPine   Tablet 5 milliGRAM(s) Oral daily  ATENolol  Tablet 50 milliGRAM(s) Oral daily  docusate sodium 100 milliGRAM(s) Oral three times a day  enoxaparin Injectable 40 milliGRAM(s) SubCutaneous every 12 hours  gabapentin 300 milliGRAM(s) Oral daily  lactated ringers. 1000 milliLiter(s) (125 mL/Hr) IV Continuous <Continuous>  lactated ringers. 1000 milliLiter(s) (75 mL/Hr) IV Continuous <Continuous>  naproxen 250 milliGRAM(s) Oral three times a day  pantoprazole    Tablet 40 milliGRAM(s) Oral before breakfast  senna 2 Tablet(s) Oral at bedtime    MEDICATIONS  (PRN):  aluminum hydroxide/magnesium hydroxide/simethicone Suspension 30 milliLiter(s) Oral four times a day PRN Indigestion  cholestyramine Powder (Sugar-Free) 4 Gram(s) Oral daily PRN diarrhea  diphenoxylate/atropine 1 Tablet(s) Oral four times a day PRN Diarrhea  HYDROmorphone   Tablet 2 milliGRAM(s) Oral every 3 hours PRN Mild Pain (1 - 3)  HYDROmorphone   Tablet 4 milliGRAM(s) Oral every 3 hours PRN Moderate Pain (4 - 6)  HYDROmorphone  Injectable 0.5 milliGRAM(s) IV Push every 3 hours PRN Severe Pain (7 - 10)  magnesium hydroxide Suspension 30 milliLiter(s) Oral daily PRN Constipation  ondansetron Injectable 4 milliGRAM(s) IV Push every 6 hours PRN Nausea and/or Vomiting  polyethylene glycol 3350 17 Gram(s) Oral daily PRN Constipation      REVIEW OF SYSTEMS:  See HPI,  all others negative    PHYSICAL EXAM:  Vital Signs Last 24 Hrs  T(C): 36.6 (06 Dec 2018 08:20), Max: 37.1 (06 Dec 2018 03:19)  T(F): 97.9 (06 Dec 2018 08:20), Max: 98.7 (06 Dec 2018 03:19)  HR: 79 (06 Dec 2018 08:20) (54 - 80)  BP: 99/56 (06 Dec 2018 08:20) (99/56 - 136/77)  BP(mean): --  RR: 18 (06 Dec 2018 08:20) (12 - 20)  SpO2: 98% (06 Dec 2018 08:20) (94% - 100%)    GENERAL: NAD, well-groomed, well-developed, awake, alert, oriented x 3, fluent and coherent speech  EYES: EOMI, PERRLA, conjunctiva and sclera clear  ENMT: No tonsillar erythema, exudates, or enlargement; Moist mucous membranes, Good dentition, No lesions  NECK: Supple, No JVD  NERVOUS SYSTEM:  Good concentration; Moving all 4 extremities; No gross sensory deficits, No facial droop  CHEST/LUNG: Clear to auscultation bilaterally; No rales, rhonchi, wheezing, or rubs  HEART: Regular rate and rhythm; No murmurs, rubs, or gallops  ABDOMEN: Soft, Nontender, obese, Bowel sounds present, No palpable masses or organomegaly, No bruits  EXTREMITIES:  2+ Peripheral Pulses, No clubbing, cyanosis, or edema, +calf tenderness to palpation  LYMPH: No lymphadenopathy  SKIN: No rashes or lesions  INCISION: dressing c/d/i    LABS:                        12.7   12.01 )-----------( 218      ( 06 Dec 2018 08:35 )             39.4     06 Dec 2018 08:35    135    |  102    |  8      ----------------------------<  107    4.6     |  22     |  0.68     Ca    9.2        06 Dec 2018 08:35             RADIOLOGY & ADDITIONAL TESTS:

## 2018-12-07 LAB
ANION GAP SERPL CALC-SCNC: 9 MMOL/L — SIGNIFICANT CHANGE UP (ref 5–17)
BUN SERPL-MCNC: 11 MG/DL — SIGNIFICANT CHANGE UP (ref 7–23)
CALCIUM SERPL-MCNC: 8.6 MG/DL — SIGNIFICANT CHANGE UP (ref 8.4–10.5)
CHLORIDE SERPL-SCNC: 99 MMOL/L — SIGNIFICANT CHANGE UP (ref 96–108)
CO2 SERPL-SCNC: 27 MMOL/L — SIGNIFICANT CHANGE UP (ref 22–31)
CREAT SERPL-MCNC: 0.98 MG/DL — SIGNIFICANT CHANGE UP (ref 0.5–1.3)
GLUCOSE BLDC GLUCOMTR-MCNC: 167 MG/DL — HIGH (ref 70–99)
GLUCOSE SERPL-MCNC: 165 MG/DL — HIGH (ref 70–99)
HCT VFR BLD CALC: 33 % — LOW (ref 34.5–45)
HGB BLD-MCNC: 11 G/DL — LOW (ref 11.5–15.5)
MAGNESIUM SERPL-MCNC: 1.8 MG/DL — SIGNIFICANT CHANGE UP (ref 1.6–2.6)
MCHC RBC-ENTMCNC: 29.6 PG — SIGNIFICANT CHANGE UP (ref 27–34)
MCHC RBC-ENTMCNC: 33.3 GM/DL — SIGNIFICANT CHANGE UP (ref 32–36)
MCV RBC AUTO: 88.7 FL — SIGNIFICANT CHANGE UP (ref 80–100)
NRBC # BLD: 0 /100 WBCS — SIGNIFICANT CHANGE UP (ref 0–0)
PLATELET # BLD AUTO: 214 K/UL — SIGNIFICANT CHANGE UP (ref 150–400)
POTASSIUM SERPL-MCNC: 3.7 MMOL/L — SIGNIFICANT CHANGE UP (ref 3.5–5.3)
POTASSIUM SERPL-SCNC: 3.7 MMOL/L — SIGNIFICANT CHANGE UP (ref 3.5–5.3)
RBC # BLD: 3.72 M/UL — LOW (ref 3.8–5.2)
RBC # FLD: 13.4 % — SIGNIFICANT CHANGE UP (ref 10.3–14.5)
SODIUM SERPL-SCNC: 135 MMOL/L — SIGNIFICANT CHANGE UP (ref 135–145)
WBC # BLD: 13.83 K/UL — HIGH (ref 3.8–10.5)
WBC # FLD AUTO: 13.83 K/UL — HIGH (ref 3.8–10.5)

## 2018-12-07 PROCEDURE — 99233 SBSQ HOSP IP/OBS HIGH 50: CPT

## 2018-12-07 RX ORDER — HYDROMORPHONE HYDROCHLORIDE 2 MG/ML
2 INJECTION INTRAMUSCULAR; INTRAVENOUS; SUBCUTANEOUS
Qty: 0 | Refills: 0 | Status: DISCONTINUED | OUTPATIENT
Start: 2018-12-07 | End: 2018-12-11

## 2018-12-07 RX ORDER — POLYETHYLENE GLYCOL 3350 17 G/17G
17 POWDER, FOR SOLUTION ORAL ONCE
Qty: 0 | Refills: 0 | Status: COMPLETED | OUTPATIENT
Start: 2018-12-07 | End: 2018-12-07

## 2018-12-07 RX ORDER — SODIUM CHLORIDE 9 MG/ML
1000 INJECTION, SOLUTION INTRAVENOUS
Qty: 0 | Refills: 0 | Status: DISCONTINUED | OUTPATIENT
Start: 2018-12-07 | End: 2018-12-11

## 2018-12-07 RX ORDER — HYDROMORPHONE HYDROCHLORIDE 2 MG/ML
4 INJECTION INTRAMUSCULAR; INTRAVENOUS; SUBCUTANEOUS
Qty: 0 | Refills: 0 | Status: DISCONTINUED | OUTPATIENT
Start: 2018-12-07 | End: 2018-12-11

## 2018-12-07 RX ADMIN — Medication 1000 MILLIGRAM(S): at 08:29

## 2018-12-07 RX ADMIN — SENNA PLUS 2 TABLET(S): 8.6 TABLET ORAL at 21:36

## 2018-12-07 RX ADMIN — ENOXAPARIN SODIUM 40 MILLIGRAM(S): 100 INJECTION SUBCUTANEOUS at 09:22

## 2018-12-07 RX ADMIN — Medication 100 MILLIGRAM(S): at 21:36

## 2018-12-07 RX ADMIN — MELOXICAM 15 MILLIGRAM(S): 15 TABLET ORAL at 09:25

## 2018-12-07 RX ADMIN — Medication 300 MILLIGRAM(S): at 13:07

## 2018-12-07 RX ADMIN — HYDROMORPHONE HYDROCHLORIDE 6 MILLIGRAM(S): 2 INJECTION INTRAMUSCULAR; INTRAVENOUS; SUBCUTANEOUS at 03:53

## 2018-12-07 RX ADMIN — Medication 1000 MILLIGRAM(S): at 00:05

## 2018-12-07 RX ADMIN — Medication 1000 MILLIGRAM(S): at 08:26

## 2018-12-07 RX ADMIN — Medication 1000 MILLIGRAM(S): at 17:56

## 2018-12-07 RX ADMIN — Medication 100 MILLIGRAM(S): at 13:07

## 2018-12-07 RX ADMIN — ATENOLOL 50 MILLIGRAM(S): 25 TABLET ORAL at 05:52

## 2018-12-07 RX ADMIN — Medication 1000 MILLIGRAM(S): at 01:00

## 2018-12-07 RX ADMIN — MELOXICAM 15 MILLIGRAM(S): 15 TABLET ORAL at 09:22

## 2018-12-07 RX ADMIN — GABAPENTIN 300 MILLIGRAM(S): 400 CAPSULE ORAL at 13:07

## 2018-12-07 RX ADMIN — HYDROMORPHONE HYDROCHLORIDE 6 MILLIGRAM(S): 2 INJECTION INTRAMUSCULAR; INTRAVENOUS; SUBCUTANEOUS at 04:50

## 2018-12-07 RX ADMIN — POLYETHYLENE GLYCOL 3350 17 GRAM(S): 17 POWDER, FOR SOLUTION ORAL at 17:56

## 2018-12-07 RX ADMIN — Medication 1000 MILLIGRAM(S): at 17:57

## 2018-12-07 RX ADMIN — PANTOPRAZOLE SODIUM 40 MILLIGRAM(S): 20 TABLET, DELAYED RELEASE ORAL at 05:52

## 2018-12-07 RX ADMIN — HYDROMORPHONE HYDROCHLORIDE 6 MILLIGRAM(S): 2 INJECTION INTRAMUSCULAR; INTRAVENOUS; SUBCUTANEOUS at 09:00

## 2018-12-07 RX ADMIN — Medication 1000 MILLIGRAM(S): at 21:36

## 2018-12-07 RX ADMIN — ENOXAPARIN SODIUM 40 MILLIGRAM(S): 100 INJECTION SUBCUTANEOUS at 21:37

## 2018-12-07 RX ADMIN — Medication 1000 MILLIGRAM(S): at 21:40

## 2018-12-07 RX ADMIN — Medication 100 MILLIGRAM(S): at 05:52

## 2018-12-07 RX ADMIN — HYDROMORPHONE HYDROCHLORIDE 6 MILLIGRAM(S): 2 INJECTION INTRAMUSCULAR; INTRAVENOUS; SUBCUTANEOUS at 08:27

## 2018-12-07 NOTE — CONSULT NOTE ADULT - SUBJECTIVE AND OBJECTIVE BOX
History of Present Illness: The patient is a 63 year old female with a history of HTN, DM, lab band surgery who is admitted s/p right THR. On telemetry noted to have frequent PVCs. As per outpatient cardiologist, nuclear stress test in 2017 revealed no evidence of ischemia and echocardiogram revealed normal LV systolic function with mild MR.    Past Medical/Surgical History:    Medications:    Family History: Non-contributory family history of premature cardiovascular atherosclerotic disease    Social History: No tobacco, alcohol or drug use    Review of Systems:  General: No fevers, chills, weight loss or gain  Skin: No rashes, color changes  Cardiovascular: No chest pain, orthopnea  Respiratory: No shortness of breath, cough  Gastrointestinal: No nausea, abdominal pain  Genitourinary: No incontinence, pain with urination  Musculoskeletal: No pain, swelling, decreased range of motion  Neurological: No headache, weakness  Psychiatric: No depression, anxiety  Endocrine: No weight loss or gain, increased thirst  All other systems are comprehensively negative.    Physical Exam:  Vitals:        Vital Signs Last 24 Hrs  T(C): 37.1 (07 Dec 2018 07:43), Max: 37.2 (07 Dec 2018 03:30)  T(F): 98.7 (07 Dec 2018 07:43), Max: 99 (07 Dec 2018 03:30)  HR: 91 (07 Dec 2018 07:43) (68 - 97)  BP: 100/68 (07 Dec 2018 07:43) (92/62 - 134/81)  BP(mean): 73 (07 Dec 2018 03:30) (72 - 73)  RR: 18 (07 Dec 2018 07:43) (16 - 18)  SpO2: 97% (07 Dec 2018 07:43) (96% - 98%)  General: NAD  HEENT: MMM  Neck: No JVD, no carotid bruit  Lungs: CTAB  CV: RRR, nl S1/S2, no M/R/G  Abdomen: S/NT/ND, +BS  Extremities: No LE edema, no cyanosis  Neuro: AAOx3, non-focal  Skin: No rash    Labs:                        11.0   13.83 )-----------( 214      ( 07 Dec 2018 10:05 )             33.0     12-07    135  |  99  |  11  ----------------------------<  165<H>  3.7   |  27  |  0.98    Ca    8.6      07 Dec 2018 10:05              ECG: NSR, LAD, nonspecific ST abnormality History of Present Illness: The patient is a 63 year old female with a history of HTN, DM, lab band surgery who is admitted s/p right THR. On telemetry noted to have intermittent PVCs. She denies palpitations, chest pain, shortness of breath. Earlier today, had an episode of diaphoresis and lightheadedness. BP noted to be low in the 80s. As per outpatient cardiologist, nuclear stress test in 2017 revealed no evidence of ischemia and echocardiogram revealed normal LV systolic function with mild MR.    Past Medical/Surgical History:    Medications:    Family History: Non-contributory family history of premature cardiovascular atherosclerotic disease    Social History: No tobacco, alcohol or drug use    Review of Systems:  General: No fevers, chills, weight loss or gain  Skin: No rashes, color changes  Cardiovascular: No chest pain, orthopnea  Respiratory: No shortness of breath, cough  Gastrointestinal: No nausea, abdominal pain  Genitourinary: No incontinence, pain with urination  Musculoskeletal: No pain, swelling, decreased range of motion  Neurological: No headache, weakness  Psychiatric: No depression, anxiety  Endocrine: No weight loss or gain, increased thirst  All other systems are comprehensively negative.    Physical Exam:  Vitals:        Vital Signs Last 24 Hrs  T(C): 37.1 (07 Dec 2018 07:43), Max: 37.2 (07 Dec 2018 03:30)  T(F): 98.7 (07 Dec 2018 07:43), Max: 99 (07 Dec 2018 03:30)  HR: 91 (07 Dec 2018 07:43) (68 - 97)  BP: 100/68 (07 Dec 2018 07:43) (92/62 - 134/81)  BP(mean): 73 (07 Dec 2018 03:30) (72 - 73)  RR: 18 (07 Dec 2018 07:43) (16 - 18)  SpO2: 97% (07 Dec 2018 07:43) (96% - 98%)  General: NAD  HEENT: MMM  Neck: No JVD, no carotid bruit  Lungs: CTAB  CV: RRR, nl S1/S2, no M/R/G  Abdomen: S/NT/ND, +BS  Extremities: No LE edema, no cyanosis  Neuro: AAOx3, non-focal  Skin: No rash    Labs:                        11.0   13.83 )-----------( 214      ( 07 Dec 2018 10:05 )             33.0     12-07    135  |  99  |  11  ----------------------------<  165<H>  3.7   |  27  |  0.98    Ca    8.6      07 Dec 2018 10:05              ECG: NSR, LAD, nonspecific ST abnormality

## 2018-12-07 NOTE — CONSULT NOTE ADULT - ASSESSMENT
The patient is a 63 year old female with a history of HTN, DM, lab band surgery who is admitted s/p right THR.    Plan:  - ECG with no evidence of ischemia or infarction  - Intermittent PVCs - asymptomatic from an ectopy standpoint. Repeat echo ordered. No further cardiac testing indicated beyond this.  - Pre-syncope - likely due to dehydration and orthostatic hypotension. Continue IV fluids today. Discontinue amlodipine for now.  - Otherwise, patient remains optimized from a cardiac perspective to proceed with planned staged THR.

## 2018-12-07 NOTE — PROGRESS NOTE ADULT - SUBJECTIVE AND OBJECTIVE BOX
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 Pt denies any CP, SOB, fever, chills, numbness/tingling. Pt resting comfortably in bed. Pt is positive void.   Current Pain Management:   [ x] Po Analgesics [x ] IM /IV Analgesics     T(F): 99  HR: 68  BP: 118/84  RR: 17  SpO2: 96%               Physical Exam :    -   Dressing changed sterile.   -   Wound C/D/I.   -   Distal Neurvascular status intact grossly.   -   Warm well perfused; capillary refill <3 seconds   -   (+)EHL/FHL   -   (+) Sensation to light touch  -   (-) Calf tenderness Bilaterally    A/P: 63y Female s/p Hip replacement: Right     -   Continue PT/OT  -   Pain control   -   Medicine to follow  -   DVT ppx:     [ x]SCDs       [x ] Lovenox  -   Weight bearing status:  WBAT   -  Dispo:     Pending stage 2

## 2018-12-07 NOTE — PROGRESS NOTE ADULT - ASSESSMENT
POD#2 s/p Stage 1 Right THR  - Pain controlled  - Bowel regimen - 1 dose miralax today on top of standing regimen, patient would like to have BM today.  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

## 2018-12-07 NOTE — PROGRESS NOTE ADULT - SUBJECTIVE AND OBJECTIVE BOX
INTERVAL HPI/OVERNIGHT EVENTS:   Patient seen and examined.  Eating, voiding, no BM yet.  Right calf pain mostly gone, dopplers neg for DVT.  No fevers, chills, sweats, dizziness, HA, changes in vision, cp, palpitations, sob, persistent cough, n/v/d, abd pain, dysuria, focal weakness.    MEDICATIONS  (STANDING):  acetaminophen   Tablet .. 1000 milliGRAM(s) Oral every 8 hours  allopurinol 300 milliGRAM(s) Oral daily  amLODIPine   Tablet 5 milliGRAM(s) Oral daily  ATENolol  Tablet 50 milliGRAM(s) Oral daily  docusate sodium 100 milliGRAM(s) Oral three times a day  enoxaparin Injectable 40 milliGRAM(s) SubCutaneous every 12 hours  gabapentin 300 milliGRAM(s) Oral daily  lactated ringers. 1000 milliLiter(s) (125 mL/Hr) IV Continuous <Continuous>  lactated ringers. 1000 milliLiter(s) (75 mL/Hr) IV Continuous <Continuous>  naproxen 250 milliGRAM(s) Oral three times a day  pantoprazole    Tablet 40 milliGRAM(s) Oral before breakfast  senna 2 Tablet(s) Oral at bedtime    MEDICATIONS  (PRN):  aluminum hydroxide/magnesium hydroxide/simethicone Suspension 30 milliLiter(s) Oral four times a day PRN Indigestion  cholestyramine Powder (Sugar-Free) 4 Gram(s) Oral daily PRN diarrhea  diphenoxylate/atropine 1 Tablet(s) Oral four times a day PRN Diarrhea  HYDROmorphone   Tablet 2 milliGRAM(s) Oral every 3 hours PRN Mild Pain (1 - 3)  HYDROmorphone   Tablet 4 milliGRAM(s) Oral every 3 hours PRN Moderate Pain (4 - 6)  HYDROmorphone  Injectable 0.5 milliGRAM(s) IV Push every 3 hours PRN Severe Pain (7 - 10)  magnesium hydroxide Suspension 30 milliLiter(s) Oral daily PRN Constipation  ondansetron Injectable 4 milliGRAM(s) IV Push every 6 hours PRN Nausea and/or Vomiting  polyethylene glycol 3350 17 Gram(s) Oral daily PRN Constipation      REVIEW OF SYSTEMS:  See HPI,  all others negative    PHYSICAL EXAM:  Vital Signs Last 24 Hrs  T(C): 37.1 (07 Dec 2018 07:43), Max: 37.2 (07 Dec 2018 03:30)  T(F): 98.7 (07 Dec 2018 07:43), Max: 99 (07 Dec 2018 03:30)  HR: 91 (07 Dec 2018 07:43) (68 - 97)  BP: 100/68 (07 Dec 2018 07:43) (92/62 - 134/81)  BP(mean): 73 (07 Dec 2018 03:30) (72 - 73)  RR: 18 (07 Dec 2018 07:43) (16 - 18)  SpO2: 97% (07 Dec 2018 07:43) (96% - 98%)    GENERAL: NAD, well-groomed, well-developed, awake, alert, oriented x 3, fluent and coherent speech  EYES: EOMI, conjunctiva and sclera clear  NECK: Supple, No JVD  NERVOUS SYSTEM:  Good concentration; Moving all 4 extremities; No gross sensory deficits, No facial droop  CHEST/LUNG: Clear to auscultation bilaterally; No rales, rhonchi, wheezing, or rubs  HEART: Regular rate and rhythm; No murmurs, rubs, or gallops  ABDOMEN: Soft, Nontender, obese, Bowel sounds present, No palpable masses or organomegaly, No bruits  EXTREMITIES:  2+ Peripheral Pulses, No clubbing, cyanosis, or edema, +calf tenderness to palpation  INCISION: dressing c/d/i    LABS:                                   11.0   13.83 )-----------( 214      ( 07 Dec 2018 10:05 )             33.0     12-06    135  |  102  |  8   ----------------------------<  107<H>  4.6   |  22  |  0.68    Ca    9.2      06 Dec 2018 08:35

## 2018-12-08 LAB
ANION GAP SERPL CALC-SCNC: 5 MMOL/L — SIGNIFICANT CHANGE UP (ref 5–17)
BUN SERPL-MCNC: 13 MG/DL — SIGNIFICANT CHANGE UP (ref 7–23)
CALCIUM SERPL-MCNC: 8.8 MG/DL — SIGNIFICANT CHANGE UP (ref 8.4–10.5)
CHLORIDE SERPL-SCNC: 103 MMOL/L — SIGNIFICANT CHANGE UP (ref 96–108)
CO2 SERPL-SCNC: 32 MMOL/L — HIGH (ref 22–31)
CREAT SERPL-MCNC: 0.95 MG/DL — SIGNIFICANT CHANGE UP (ref 0.5–1.3)
GLUCOSE SERPL-MCNC: 109 MG/DL — HIGH (ref 70–99)
HCT VFR BLD CALC: 33.2 % — LOW (ref 34.5–45)
HGB BLD-MCNC: 10.6 G/DL — LOW (ref 11.5–15.5)
MCHC RBC-ENTMCNC: 29.3 PG — SIGNIFICANT CHANGE UP (ref 27–34)
MCHC RBC-ENTMCNC: 31.9 GM/DL — LOW (ref 32–36)
MCV RBC AUTO: 91.7 FL — SIGNIFICANT CHANGE UP (ref 80–100)
NRBC # BLD: 0 /100 WBCS — SIGNIFICANT CHANGE UP (ref 0–0)
PLATELET # BLD AUTO: 230 K/UL — SIGNIFICANT CHANGE UP (ref 150–400)
POTASSIUM SERPL-MCNC: 4 MMOL/L — SIGNIFICANT CHANGE UP (ref 3.5–5.3)
POTASSIUM SERPL-SCNC: 4 MMOL/L — SIGNIFICANT CHANGE UP (ref 3.5–5.3)
RBC # BLD: 3.62 M/UL — LOW (ref 3.8–5.2)
RBC # FLD: 13.5 % — SIGNIFICANT CHANGE UP (ref 10.3–14.5)
SODIUM SERPL-SCNC: 140 MMOL/L — SIGNIFICANT CHANGE UP (ref 135–145)
WBC # BLD: 11.26 K/UL — HIGH (ref 3.8–10.5)
WBC # FLD AUTO: 11.26 K/UL — HIGH (ref 3.8–10.5)

## 2018-12-08 PROCEDURE — 99233 SBSQ HOSP IP/OBS HIGH 50: CPT

## 2018-12-08 RX ADMIN — Medication 300 MILLIGRAM(S): at 12:51

## 2018-12-08 RX ADMIN — MELOXICAM 15 MILLIGRAM(S): 15 TABLET ORAL at 10:19

## 2018-12-08 RX ADMIN — ENOXAPARIN SODIUM 40 MILLIGRAM(S): 100 INJECTION SUBCUTANEOUS at 09:49

## 2018-12-08 RX ADMIN — HYDROMORPHONE HYDROCHLORIDE 2 MILLIGRAM(S): 2 INJECTION INTRAMUSCULAR; INTRAVENOUS; SUBCUTANEOUS at 13:20

## 2018-12-08 RX ADMIN — ENOXAPARIN SODIUM 40 MILLIGRAM(S): 100 INJECTION SUBCUTANEOUS at 21:04

## 2018-12-08 RX ADMIN — Medication 1000 MILLIGRAM(S): at 05:53

## 2018-12-08 RX ADMIN — HYDROMORPHONE HYDROCHLORIDE 2 MILLIGRAM(S): 2 INJECTION INTRAMUSCULAR; INTRAVENOUS; SUBCUTANEOUS at 21:34

## 2018-12-08 RX ADMIN — Medication 1000 MILLIGRAM(S): at 21:04

## 2018-12-08 RX ADMIN — HYDROMORPHONE HYDROCHLORIDE 2 MILLIGRAM(S): 2 INJECTION INTRAMUSCULAR; INTRAVENOUS; SUBCUTANEOUS at 03:22

## 2018-12-08 RX ADMIN — HYDROMORPHONE HYDROCHLORIDE 2 MILLIGRAM(S): 2 INJECTION INTRAMUSCULAR; INTRAVENOUS; SUBCUTANEOUS at 16:13

## 2018-12-08 RX ADMIN — HYDROMORPHONE HYDROCHLORIDE 2 MILLIGRAM(S): 2 INJECTION INTRAMUSCULAR; INTRAVENOUS; SUBCUTANEOUS at 12:50

## 2018-12-08 RX ADMIN — Medication 1000 MILLIGRAM(S): at 15:44

## 2018-12-08 RX ADMIN — HYDROMORPHONE HYDROCHLORIDE 2 MILLIGRAM(S): 2 INJECTION INTRAMUSCULAR; INTRAVENOUS; SUBCUTANEOUS at 15:43

## 2018-12-08 RX ADMIN — Medication 100 MILLIGRAM(S): at 15:43

## 2018-12-08 RX ADMIN — HYDROMORPHONE HYDROCHLORIDE 2 MILLIGRAM(S): 2 INJECTION INTRAMUSCULAR; INTRAVENOUS; SUBCUTANEOUS at 21:04

## 2018-12-08 RX ADMIN — Medication 100 MILLIGRAM(S): at 21:04

## 2018-12-08 RX ADMIN — GABAPENTIN 300 MILLIGRAM(S): 400 CAPSULE ORAL at 12:50

## 2018-12-08 RX ADMIN — PANTOPRAZOLE SODIUM 40 MILLIGRAM(S): 20 TABLET, DELAYED RELEASE ORAL at 05:53

## 2018-12-08 RX ADMIN — HYDROMORPHONE HYDROCHLORIDE 2 MILLIGRAM(S): 2 INJECTION INTRAMUSCULAR; INTRAVENOUS; SUBCUTANEOUS at 03:52

## 2018-12-08 RX ADMIN — MELOXICAM 15 MILLIGRAM(S): 15 TABLET ORAL at 09:49

## 2018-12-08 RX ADMIN — Medication 1000 MILLIGRAM(S): at 16:14

## 2018-12-08 RX ADMIN — SENNA PLUS 2 TABLET(S): 8.6 TABLET ORAL at 21:04

## 2018-12-08 RX ADMIN — ATENOLOL 50 MILLIGRAM(S): 25 TABLET ORAL at 05:53

## 2018-12-08 RX ADMIN — SODIUM CHLORIDE 75 MILLILITER(S): 9 INJECTION, SOLUTION INTRAVENOUS at 12:52

## 2018-12-08 RX ADMIN — Medication 1000 MILLIGRAM(S): at 21:06

## 2018-12-08 NOTE — PROGRESS NOTE ADULT - ASSESSMENT
The patient is a 63 year old female with a history of HTN, DM, lab band surgery who is admitted for staged THR.    Plan:  - ECG with no evidence of ischemia or infarction  - Intermittent PVCs - asymptomatic from an ectopy standpoint. Repeat echo with normal LV systolic function, no significant valve issues.  - Pre-syncope - likely due to dehydration and orthostatic hypotension. Improved. Hold amlodipine  - Otherwise, patient remains optimized from a cardiac perspective to proceed with planned staged THR.

## 2018-12-08 NOTE — PROGRESS NOTE ADULT - ASSESSMENT
1. S/P  Stage 1 Right THR- POD#3  - Pain controlled  - Bowel regimen.  - Hives with Celebrex, but tolerates diclofenac, ibuprofen, and mobic  - VTE PPx - Lovenox as per orthopedic recommendation.   - Stage 2 planned for Monday , needs clearence tomorrow 12/9.     2. Right Calf Pain  -improved.   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 Gout  - continue allopurinol    6. Morbid Obesity  - weight has been stable over past year  - had PSG last year, showed mild WHIT, CPAP was not prescribed per patient  - nocturnal oxygen    7.  Neuropathy s/p dental procedure (numbness on lower part of face)- Continue with   gabapentin.     8. Leukocytosis- likely reactive from post operative .   Repeat CBC in am.

## 2018-12-08 NOTE — PROGRESS NOTE ADULT - SUBJECTIVE AND OBJECTIVE BOX
POST OPERATIVE DAY #: 3    63y Female  s/p   Right  Posterior THR                        SUBJECTIVE: Patient seen and examined at bedside.     Pain:  well controlled        Pain scale:   3/10  Denies CP, SOB, N/V/D, weakness, numbness   No new complains     OBJECTIVE:     Vital Signs Last 24 Hrs  T(C): 36.9 (08 Dec 2018 07:37), Max: 37.2 (07 Dec 2018 15:01)  T(F): 98.4 (08 Dec 2018 07:37), Max: 99 (07 Dec 2018 15:01)  HR: 76 (08 Dec 2018 07:37) (65 - 87)  BP: 114/82 (08 Dec 2018 07:37) (88/54 - 124/66)  BP(mean): --  RR: 16 (08 Dec 2018 07:37) (14 - 18)  SpO2: 100% (08 Dec 2018 07:37) (96% - 100%)    Affected extremity: RLE/ LLE         Dressing: changed, incision clean/dry/intact                          Sensation: intact to light touch          Motor exam:   5/ 5 Tibialis Anterior/Gastrocnemius-Soleus, EHL/FHL         warm, well-perfused; capillary refill < 3 seconds         negative calf tenderness B/L LE    LABS:                        11.0   13.83 )-----------( 214      ( 07 Dec 2018 10:05 )             33.0     12-07    135  |  99  |  11  ----------------------------<  165<H>  3.7   |  27  |  0.98    Ca    8.6      07 Dec 2018 10:05  Mg     1.8     12-07          MEDICATIONS:      Pain management:  acetaminophen   Tablet .. 1000 milliGRAM(s) Oral every 8 hours  gabapentin 300 milliGRAM(s) Oral daily  HYDROmorphone   Tablet 2 milliGRAM(s) Oral every 3 hours PRN  HYDROmorphone   Tablet 4 milliGRAM(s) Oral every 3 hours PRN  HYDROmorphone  Injectable 0.5 milliGRAM(s) IV Push every 3 hours PRN  ondansetron Injectable 4 milliGRAM(s) IV Push every 6 hours PRN    DVT prophylaxis:   enoxaparin Injectable 40 milliGRAM(s) SubCutaneous every 12 hours      RADIOLOGY & ADDITIONAL STUDIES:    ASSESSMENT AND PLAN:   - B/L LE doppler negative   - Analgesic pain control  - DVT prophylaxis:  Lovenox 40mg daily    SCDs       - HO prophylaxis: Meloxicam 15mg daily  - PT/OT: Weight Bearing Status:  Weight bearing as tolerated, OOBTC         -  Incentive spirometry  - IVF  - Advance diet as tolerated  - Hospitalist is following: preop clearance for monday  -  Follow up labs  -  Disposition: staged

## 2018-12-08 NOTE — PROGRESS NOTE ADULT - SUBJECTIVE AND OBJECTIVE BOX
Chief Complaint: Staged THR    Interval Events: No events overnight. No complaints.    Review of Systems:  General: No fevers, chills, weight loss or gain  Skin: No rashes, color changes  Cardiovascular: No chest pain, orthopnea  Respiratory: No shortness of breath, cough  Gastrointestinal: No nausea, abdominal pain  Genitourinary: No incontinence, pain with urination  Musculoskeletal: No pain, swelling, decreased range of motion  Neurological: No headache, weakness  Psychiatric: No depression, anxiety  Endocrine: No weight loss or gain, increased thirst  All other systems are comprehensively negative.    Physical Exam:  Vitals:        Vital Signs Last 24 Hrs  T(C): 36.9 (08 Dec 2018 07:37), Max: 37.2 (07 Dec 2018 15:01)  T(F): 98.4 (08 Dec 2018 07:37), Max: 99 (07 Dec 2018 15:01)  HR: 76 (08 Dec 2018 07:37) (65 - 87)  BP: 114/82 (08 Dec 2018 07:37) (88/54 - 124/66)  BP(mean): --  RR: 16 (08 Dec 2018 07:37) (14 - 18)  SpO2: 100% (08 Dec 2018 07:37) (96% - 100%)  General: NAD  HEENT: MMM  Neck: No JVD, no carotid bruit  Lungs: CTAB  CV: RRR, nl S1/S2, no M/R/G  Abdomen: S/NT/ND, +BS  Extremities: No LE edema, no cyanosis  Neuro: AAOx3, non-focal  Skin: No rash    Labs:                        11.0   13.83 )-----------( 214      ( 07 Dec 2018 10:05 )             33.0     12-07    135  |  99  |  11  ----------------------------<  165<H>  3.7   |  27  |  0.98    Ca    8.6      07 Dec 2018 10:05  Mg     1.8     12-07              Telemetry: Sinus rhythm, PVCs

## 2018-12-08 NOTE — PROGRESS NOTE ADULT - SUBJECTIVE AND OBJECTIVE BOX
Patient is a 63y old  Female who presents with a chief complaint of Patient is a 63y old  Female who presents with a chief complaint of Patient is a 63y old  Female who presents with a chief complaint of Severe osteoarthritis bilateral hips (06 Dec 2018 10:26) (06 Dec 2018 10:57) (07 Dec 2018 10:27)      INTERVAL HPI/OVERNIGHT EVENTS: No acute overnight events. pt is feeling fine.     MEDICATIONS  (STANDING):  acetaminophen   Tablet .. 1000 milliGRAM(s) Oral every 8 hours  allopurinol 300 milliGRAM(s) Oral daily  ATENolol  Tablet 50 milliGRAM(s) Oral daily  docusate sodium 100 milliGRAM(s) Oral three times a day  enoxaparin Injectable 40 milliGRAM(s) SubCutaneous every 12 hours  gabapentin 300 milliGRAM(s) Oral daily  lactated ringers. 1000 milliLiter(s) (75 mL/Hr) IV Continuous <Continuous>  pantoprazole    Tablet 40 milliGRAM(s) Oral before breakfast  senna 2 Tablet(s) Oral at bedtime    MEDICATIONS  (PRN):  aluminum hydroxide/magnesium hydroxide/simethicone Suspension 30 milliLiter(s) Oral four times a day PRN Indigestion  bisacodyl Suppository 10 milliGRAM(s) Rectal daily PRN If no bowel movement by POD#2  cholestyramine Powder (Sugar-Free) 4 Gram(s) Oral daily PRN diarrhea  diphenoxylate/atropine 1 Tablet(s) Oral four times a day PRN Diarrhea  HYDROmorphone   Tablet 2 milliGRAM(s) Oral every 3 hours PRN Mild Pain (1 - 3)  HYDROmorphone   Tablet 4 milliGRAM(s) Oral every 3 hours PRN Moderate Pain (4 - 6)  HYDROmorphone  Injectable 0.5 milliGRAM(s) IV Push every 3 hours PRN Severe Pain (7 - 10)  magnesium hydroxide Suspension 30 milliLiter(s) Oral daily PRN Constipation  ondansetron Injectable 4 milliGRAM(s) IV Push every 6 hours PRN Nausea and/or Vomiting  polyethylene glycol 3350 17 Gram(s) Oral daily PRN Constipation      Allergies    Celebrex (Hives)    Intolerances        REVIEW OF SYSTEMS:  CONSTITUTIONAL: No fever or chills  HEENT:  No headache, no sore throat  RESPIRATORY: No cough, wheezing, or shortness of breath  CARDIOVASCULAR: No chest pain, palpitations, or leg swelling  GASTROINTESTINAL: No nausea, vomiting, or diarrhea  GENITOURINARY: No dysuria, frequency, or hematuria  NEUROLOGICAL: no focal weakness or dizziness  SKIN:  No rashes or lesions   MUSCULOSKELETAL: no myalgias   PSYCHIATRIC: No depression or anxiety       Vital Signs Last 24 Hrs  T(C): 36.9 (08 Dec 2018 07:37), Max: 37.2 (07 Dec 2018 15:01)  T(F): 98.4 (08 Dec 2018 07:37), Max: 99 (07 Dec 2018 15:01)  HR: 78 (08 Dec 2018 12:44) (65 - 87)  BP: 105/70 (08 Dec 2018 12:44) (97/59 - 124/66)  BP(mean): --  RR: 16 (08 Dec 2018 07:37) (14 - 18)  SpO2: 98% (08 Dec 2018 12:44) (96% - 100%)    PHYSICAL EXAM:  GENERAL: obese  HEENT:  EOMI, mmm  CHEST/LUNG:  CTA b/l, no rales, wheezes, or rhonchi  HEART:  RRR, S1, S2, []murmur  ABDOMEN:  BS+, soft, NT, ND  EXTREMITIES: no edema or calf tenderness , dressing dry and intact.   NERVOUS SYSTEM: AA&Ox3 , no focal neurological deficit.     DIET:     Cultures:     LABS:    CBC Full  -  ( 07 Dec 2018 10:05 )  WBC Count : 13.83 K/uL  Hemoglobin : 11.0 g/dL  Hematocrit : 33.0 %  Platelet Count - Automated : 214 K/uL  Mean Cell Volume : 88.7 fl  Mean Cell Hemoglobin : 29.6 pg  Mean Cell Hemoglobin Concentration : 33.3 gm/dL  Auto Neutrophil # : x  Auto Lymphocyte # : x  Auto Monocyte # : x  Auto Eosinophil # : x  Auto Basophil # : x  Auto Neutrophil % : x  Auto Lymphocyte % : x  Auto Monocyte % : x  Auto Eosinophil % : x  Auto Basophil % : x      Ca    8.6        07 Dec 2018 10:05          CAPILLARY BLOOD GLUCOSE              RADIOLOGY & ADDITIONAL TESTS:    Personally reviewed.     Consultant(s) Notes Reviewed:  [x] YES  [ ] NO    Care Discussed with [ ] Consultants  [x] Patient  [ ] Family  [x]      [ ] Other; RN  DVT ppx  Advanced directive

## 2018-12-09 LAB
ANION GAP SERPL CALC-SCNC: 4 MMOL/L — LOW (ref 5–17)
APTT BLD: 30.2 SEC — SIGNIFICANT CHANGE UP (ref 27.5–36.3)
BUN SERPL-MCNC: 12 MG/DL — SIGNIFICANT CHANGE UP (ref 7–23)
CALCIUM SERPL-MCNC: 8.7 MG/DL — SIGNIFICANT CHANGE UP (ref 8.4–10.5)
CHLORIDE SERPL-SCNC: 103 MMOL/L — SIGNIFICANT CHANGE UP (ref 96–108)
CO2 SERPL-SCNC: 32 MMOL/L — HIGH (ref 22–31)
CREAT SERPL-MCNC: 0.79 MG/DL — SIGNIFICANT CHANGE UP (ref 0.5–1.3)
GLUCOSE SERPL-MCNC: 101 MG/DL — HIGH (ref 70–99)
HCT VFR BLD CALC: 31.9 % — LOW (ref 34.5–45)
HGB BLD-MCNC: 10.3 G/DL — LOW (ref 11.5–15.5)
INR BLD: 1.16 RATIO — SIGNIFICANT CHANGE UP (ref 0.88–1.16)
MCHC RBC-ENTMCNC: 28.9 PG — SIGNIFICANT CHANGE UP (ref 27–34)
MCHC RBC-ENTMCNC: 32.3 GM/DL — SIGNIFICANT CHANGE UP (ref 32–36)
MCV RBC AUTO: 89.6 FL — SIGNIFICANT CHANGE UP (ref 80–100)
NRBC # BLD: 0 /100 WBCS — SIGNIFICANT CHANGE UP (ref 0–0)
PLATELET # BLD AUTO: 248 K/UL — SIGNIFICANT CHANGE UP (ref 150–400)
POTASSIUM SERPL-MCNC: 4 MMOL/L — SIGNIFICANT CHANGE UP (ref 3.5–5.3)
POTASSIUM SERPL-SCNC: 4 MMOL/L — SIGNIFICANT CHANGE UP (ref 3.5–5.3)
PROTHROM AB SERPL-ACNC: 12.7 SEC — SIGNIFICANT CHANGE UP (ref 10–12.9)
RBC # BLD: 3.56 M/UL — LOW (ref 3.8–5.2)
RBC # FLD: 13.5 % — SIGNIFICANT CHANGE UP (ref 10.3–14.5)
SODIUM SERPL-SCNC: 139 MMOL/L — SIGNIFICANT CHANGE UP (ref 135–145)
WBC # BLD: 10.69 K/UL — HIGH (ref 3.8–10.5)
WBC # FLD AUTO: 10.69 K/UL — HIGH (ref 3.8–10.5)

## 2018-12-09 PROCEDURE — 99232 SBSQ HOSP IP/OBS MODERATE 35: CPT

## 2018-12-09 RX ADMIN — SENNA PLUS 2 TABLET(S): 8.6 TABLET ORAL at 21:17

## 2018-12-09 RX ADMIN — Medication 1000 MILLIGRAM(S): at 14:37

## 2018-12-09 RX ADMIN — HYDROMORPHONE HYDROCHLORIDE 2 MILLIGRAM(S): 2 INJECTION INTRAMUSCULAR; INTRAVENOUS; SUBCUTANEOUS at 03:30

## 2018-12-09 RX ADMIN — HYDROMORPHONE HYDROCHLORIDE 2 MILLIGRAM(S): 2 INJECTION INTRAMUSCULAR; INTRAVENOUS; SUBCUTANEOUS at 14:06

## 2018-12-09 RX ADMIN — Medication 100 MILLIGRAM(S): at 21:17

## 2018-12-09 RX ADMIN — Medication 1000 MILLIGRAM(S): at 05:49

## 2018-12-09 RX ADMIN — Medication 300 MILLIGRAM(S): at 13:16

## 2018-12-09 RX ADMIN — PANTOPRAZOLE SODIUM 40 MILLIGRAM(S): 20 TABLET, DELAYED RELEASE ORAL at 05:49

## 2018-12-09 RX ADMIN — ATENOLOL 50 MILLIGRAM(S): 25 TABLET ORAL at 05:49

## 2018-12-09 RX ADMIN — Medication 1000 MILLIGRAM(S): at 21:59

## 2018-12-09 RX ADMIN — ENOXAPARIN SODIUM 40 MILLIGRAM(S): 100 INJECTION SUBCUTANEOUS at 09:03

## 2018-12-09 RX ADMIN — Medication 1000 MILLIGRAM(S): at 14:07

## 2018-12-09 RX ADMIN — GABAPENTIN 300 MILLIGRAM(S): 400 CAPSULE ORAL at 13:16

## 2018-12-09 RX ADMIN — HYDROMORPHONE HYDROCHLORIDE 2 MILLIGRAM(S): 2 INJECTION INTRAMUSCULAR; INTRAVENOUS; SUBCUTANEOUS at 04:00

## 2018-12-09 RX ADMIN — HYDROMORPHONE HYDROCHLORIDE 2 MILLIGRAM(S): 2 INJECTION INTRAMUSCULAR; INTRAVENOUS; SUBCUTANEOUS at 14:36

## 2018-12-09 RX ADMIN — Medication 1000 MILLIGRAM(S): at 22:30

## 2018-12-09 RX ADMIN — Medication 100 MILLIGRAM(S): at 13:16

## 2018-12-09 NOTE — PROGRESS NOTE ADULT - SUBJECTIVE AND OBJECTIVE BOX
Chief Complaint: Staged THR    Interval Events: No events overnight. No complaints.    Review of Systems:  General: No fevers, chills, weight loss or gain  Skin: No rashes, color changes  Cardiovascular: No chest pain, orthopnea  Respiratory: No shortness of breath, cough  Gastrointestinal: No nausea, abdominal pain  Genitourinary: No incontinence, pain with urination  Musculoskeletal: No pain, swelling, decreased range of motion  Neurological: No headache, weakness  Psychiatric: No depression, anxiety  Endocrine: No weight loss or gain, increased thirst  All other systems are comprehensively negative.    Physical Exam:  Vital Signs Last 24 Hrs  T(C): 36.7 (09 Dec 2018 08:12), Max: 36.7 (09 Dec 2018 08:12)  T(F): 98.1 (09 Dec 2018 08:12), Max: 98.1 (09 Dec 2018 08:12)  HR: 79 (09 Dec 2018 08:12) (68 - 95)  BP: 117/73 (09 Dec 2018 08:12) (105/70 - 132/82)  BP(mean): --  RR: 18 (09 Dec 2018 08:12) (12 - 18)  SpO2: 100% (09 Dec 2018 08:12) (94% - 100%)  General: NAD  HEENT: MMM  Neck: No JVD, no carotid bruit  Lungs: CTAB  CV: RRR, nl S1/S2, no M/R/G  Abdomen: S/NT/ND, +BS  Extremities: No LE edema, no cyanosis  Neuro: AAOx3, non-focal  Skin: No rash    Labs:    12-09    139  |  103  |  12  ----------------------------<  101<H>  4.0   |  32<H>  |  0.79    Ca    8.7      09 Dec 2018 06:37  Mg     1.8     12-07                          10.3   10.69 )-----------( 248      ( 09 Dec 2018 06:37 )             31.9       Telemetry: Sinus rhythm, PVCs

## 2018-12-09 NOTE — PROGRESS NOTE ADULT - SUBJECTIVE AND OBJECTIVE BOX
ORTHOPEDIC PA PROGRESS NOTE  KAREN PETERSON      63y Female                                                                                                                               POD #    4d    STATUS POST:       Procedure: Hip replacement: Right           No complaints.      Pain (0-10):  Pt reports 4  Current Pain Management:  PRN    T(F): 98.1  HR: 79  BP: 117/73  RR: 18  SpO2: 100%                         10.3   10.69 )-----------( 248      ( 09 Dec 2018 06:37 )             31.9         12-09    139  |  103  |  12  ----------------------------<  101<H>  4.0   |  32<H>  |  0.79    Ca    8.7      09 Dec 2018 06:37    PT/INR - ( 09 Dec 2018 06:37 )   PT: 12.7 sec;   INR: 1.16 ratio           Physical Exam :    -   Dressing changed sterile.   -   Wound C/D/I.   -   Distal Neurvascular status intact grossly.   -   Warm well perfused; capillary refill <3 seconds   -   (+)EHL/FHL   -   (+) Sensation to light touch  -   (-) Calf tenderness Bilaterally    A/P: 63y Female s/p Hip replacement: Right     -   Ortho Stable  -   Pain control:  PRN  -   Medicine to follow  -   DVT ppx:    PAS +  enoxaparin Injectable: 40 milliGRAM(s) SubCutaneous, enoxaparin Injectable: 40 milliGRAM(s) SubCutaneous  -   Weight bearing status: WBAT   -  Dispo:   JESSY after STAGE 2

## 2018-12-09 NOTE — PROGRESS NOTE ADULT - SUBJECTIVE AND OBJECTIVE BOX
CC.  S/p Right Total hip replacement  HPI.  Patient reports right hip pain controlled.  Offers no other complaints          Constitutional: No fever, fatigue or weight loss.  Skin: No rash.  Eyes: No recent vision problems or eye pain.  ENT: No congestion, ear pain, or sore throat.  Endocrine: No thyroid problems.  Cardiovascular: No chest pain or palpation.  Respiratory: No cough, shortness of breath, congestion, or wheezing.  Gastrointestinal: No abdominal pain, nausea, vomiting, or diarrhea.  Genitourinary: No dysuria.  Musculoskeletal: No joint swelling.  Neurologic: No headache.      Vital Signs Last 24 Hrs  T(C): 36.7 (12-09-18 @ 08:12), Max: 36.7 (12-09-18 @ 08:12)  T(F): 98.1 (12-09-18 @ 08:12), Max: 98.1 (12-09-18 @ 08:12)  HR: 79 (12-09-18 @ 08:12) (68 - 95)  BP: 117/73 (12-09-18 @ 08:12) (105/70 - 132/82)  BP(mean): --  RR: 18 (12-09-18 @ 08:12) (12 - 18)  SpO2: 100% (12-09-18 @ 08:12) (94% - 100%)        PHYSICAL EXAM-  GENERAL: NAD, well-groomed, well-developed  HEAD:  Atraumatic, Normocephalic  EYES: EOMI, PERRLA, conjunctiva and sclera clear  NECK: Supple, No JVD, Normal thyroid  NERVOUS SYSTEM:  Alert & Oriented X3, Motor Strength 5/5 B/L upper and lower extremities; DTRs 2+ intact and symmetric  CHEST/LUNG: Clear to percussion bilaterally; No rales, rhonchi, wheezing, or rubs  HEART: Regular rate and rhythm; No murmurs, rubs, or gallops  ABDOMEN: Soft, Nontender, Nondistended; Bowel sounds present  EXTREMITIES:  2+ Peripheral Pulses, No clubbing, cyanosis, or edema  SKIN: No rashes or lesions                                  10.3   10.69 )-----------( 248      ( 09 Dec 2018 06:37 )             31.9     12-09    139  |  103  |  12  ----------------------------<  101<H>  4.0   |  32<H>  |  0.79    Ca    8.7      09 Dec 2018 06:37              PT/INR - ( 09 Dec 2018 06:37 )   PT: 12.7 sec;   INR: 1.16 ratio         PTT - ( 09 Dec 2018 06:37 )  PTT:30.2 sec        MEDICATIONS  (STANDING):  acetaminophen   Tablet .. 1000 milliGRAM(s) Oral every 8 hours  allopurinol 300 milliGRAM(s) Oral daily  ATENolol  Tablet 50 milliGRAM(s) Oral daily  docusate sodium 100 milliGRAM(s) Oral three times a day  gabapentin 300 milliGRAM(s) Oral daily  lactated ringers. 1000 milliLiter(s) (75 mL/Hr) IV Continuous <Continuous>  pantoprazole    Tablet 40 milliGRAM(s) Oral before breakfast  senna 2 Tablet(s) Oral at bedtime    MEDICATIONS  (PRN):  aluminum hydroxide/magnesium hydroxide/simethicone Suspension 30 milliLiter(s) Oral four times a day PRN Indigestion  bisacodyl Suppository 10 milliGRAM(s) Rectal daily PRN If no bowel movement by POD#2  cholestyramine Powder (Sugar-Free) 4 Gram(s) Oral daily PRN diarrhea  diphenoxylate/atropine 1 Tablet(s) Oral four times a day PRN Diarrhea  HYDROmorphone   Tablet 2 milliGRAM(s) Oral every 3 hours PRN Mild Pain (1 - 3)  HYDROmorphone   Tablet 4 milliGRAM(s) Oral every 3 hours PRN Moderate Pain (4 - 6)  HYDROmorphone  Injectable 0.5 milliGRAM(s) IV Push every 3 hours PRN Severe Pain (7 - 10)  magnesium hydroxide Suspension 30 milliLiter(s) Oral daily PRN Constipation  ondansetron Injectable 4 milliGRAM(s) IV Push every 6 hours PRN Nausea and/or Vomiting  polyethylene glycol 3350 17 Gram(s) Oral daily PRN Constipation          Imaging Personally Reviewed:     [x ] YES  [ ] NO    Consultant(s) Notes Reviewed:  [x ] YES  [ ] NO    Care Discussed with Consultants/Other Providers [x ] YES  [ ] No medical contraindication for discharge

## 2018-12-09 NOTE — PROGRESS NOTE ADULT - ASSESSMENT
patient is 62 yo female presenting with     1. S/P Right Total hip replacement   -Cancelled staged  - Continue with pain management, DVT proph, and wound care as per Ortho.  PT/OT    2. Right Calf Pain  - mostly gone  - LE Doppler neg for DVT    3. Frequent PVCs on tele  - had ECHO with normal EF last year per cardio clearance  - last stress test was in 2016, also normal per clearance  - add on mag  - ECHO noticed  - asymptomatic  -Cardiology to follow up     4.  HTN  - continue atenolol.  Hold amlodipine.  Monitor BP     5. Gout  - continue allopurinol    6. Morbid Obesity  - weight has been stable over past year  - had PSG last year, showed mild WHIT, CPAP was not prescribed per patient  - nocturnal oxygen      Plan of care was discussed with patient  in great details, All questions were answered to their satisfaction  Seems to understand, and in agreement

## 2018-12-09 NOTE — PROGRESS NOTE ADULT - ASSESSMENT
The patient is a 63 year old female with a history of HTN, DM, lab band surgery who is admitted for staged THR.    Plan:  - ECG with no evidence of ischemia or infarction  - Intermittent PVCs - asymptomatic from an ectopy standpoint. Repeat echo with normal LV systolic function, no significant valve issues. Discontinue telemetry.  - Pre-syncope - likely due to dehydration and orthostatic hypotension. Improved. Hold amlodipine  - Patient remains optimized from a cardiac perspective to proceed with planned staged THR if that is still the plan

## 2018-12-10 ENCOUNTER — TRANSCRIPTION ENCOUNTER (OUTPATIENT)
Age: 63
End: 2018-12-10

## 2018-12-10 ENCOUNTER — APPOINTMENT (OUTPATIENT)
Dept: ORTHOPEDIC SURGERY | Facility: HOSPITAL | Age: 63
End: 2018-12-10

## 2018-12-10 LAB
ANION GAP SERPL CALC-SCNC: 7 MMOL/L — SIGNIFICANT CHANGE UP (ref 5–17)
BUN SERPL-MCNC: 12 MG/DL — SIGNIFICANT CHANGE UP (ref 7–23)
CALCIUM SERPL-MCNC: 8.8 MG/DL — SIGNIFICANT CHANGE UP (ref 8.4–10.5)
CHLORIDE SERPL-SCNC: 103 MMOL/L — SIGNIFICANT CHANGE UP (ref 96–108)
CO2 SERPL-SCNC: 28 MMOL/L — SIGNIFICANT CHANGE UP (ref 22–31)
CREAT SERPL-MCNC: 0.8 MG/DL — SIGNIFICANT CHANGE UP (ref 0.5–1.3)
GLUCOSE SERPL-MCNC: 94 MG/DL — SIGNIFICANT CHANGE UP (ref 70–99)
HCT VFR BLD CALC: 30.1 % — LOW (ref 34.5–45)
HGB BLD-MCNC: 9.6 G/DL — LOW (ref 11.5–15.5)
MCHC RBC-ENTMCNC: 29.4 PG — SIGNIFICANT CHANGE UP (ref 27–34)
MCHC RBC-ENTMCNC: 31.9 GM/DL — LOW (ref 32–36)
MCV RBC AUTO: 92 FL — SIGNIFICANT CHANGE UP (ref 80–100)
NRBC # BLD: 0 /100 WBCS — SIGNIFICANT CHANGE UP (ref 0–0)
PLATELET # BLD AUTO: 261 K/UL — SIGNIFICANT CHANGE UP (ref 150–400)
POTASSIUM SERPL-MCNC: 4 MMOL/L — SIGNIFICANT CHANGE UP (ref 3.5–5.3)
POTASSIUM SERPL-SCNC: 4 MMOL/L — SIGNIFICANT CHANGE UP (ref 3.5–5.3)
RBC # BLD: 3.27 M/UL — LOW (ref 3.8–5.2)
RBC # FLD: 13.7 % — SIGNIFICANT CHANGE UP (ref 10.3–14.5)
SODIUM SERPL-SCNC: 138 MMOL/L — SIGNIFICANT CHANGE UP (ref 135–145)
WBC # BLD: 11.01 K/UL — HIGH (ref 3.8–10.5)
WBC # FLD AUTO: 11.01 K/UL — HIGH (ref 3.8–10.5)

## 2018-12-10 PROCEDURE — 99232 SBSQ HOSP IP/OBS MODERATE 35: CPT

## 2018-12-10 RX ORDER — APIXABAN 2.5 MG/1
2.5 TABLET, FILM COATED ORAL EVERY 12 HOURS
Qty: 0 | Refills: 0 | Status: DISCONTINUED | OUTPATIENT
Start: 2018-12-10 | End: 2018-12-11

## 2018-12-10 RX ORDER — SENNA PLUS 8.6 MG/1
2 TABLET ORAL
Qty: 0 | Refills: 0 | COMMUNITY
Start: 2018-12-10

## 2018-12-10 RX ORDER — DOCUSATE SODIUM 100 MG
1 CAPSULE ORAL
Qty: 0 | Refills: 0 | COMMUNITY
Start: 2018-12-10

## 2018-12-10 RX ORDER — POLYETHYLENE GLYCOL 3350 17 G/17G
17 POWDER, FOR SOLUTION ORAL
Qty: 0 | Refills: 0 | COMMUNITY
Start: 2018-12-10

## 2018-12-10 RX ORDER — HYDROMORPHONE HYDROCHLORIDE 2 MG/ML
1 INJECTION INTRAMUSCULAR; INTRAVENOUS; SUBCUTANEOUS
Qty: 0 | Refills: 0 | COMMUNITY
Start: 2018-12-10

## 2018-12-10 RX ORDER — APIXABAN 2.5 MG/1
1 TABLET, FILM COATED ORAL
Qty: 0 | Refills: 0 | COMMUNITY
Start: 2018-12-10

## 2018-12-10 RX ORDER — ACETAMINOPHEN 500 MG
2 TABLET ORAL
Qty: 0 | Refills: 0 | COMMUNITY
Start: 2018-12-10

## 2018-12-10 RX ADMIN — HYDROMORPHONE HYDROCHLORIDE 2 MILLIGRAM(S): 2 INJECTION INTRAMUSCULAR; INTRAVENOUS; SUBCUTANEOUS at 05:22

## 2018-12-10 RX ADMIN — APIXABAN 2.5 MILLIGRAM(S): 2.5 TABLET, FILM COATED ORAL at 10:59

## 2018-12-10 RX ADMIN — Medication 1000 MILLIGRAM(S): at 21:31

## 2018-12-10 RX ADMIN — Medication 100 MILLIGRAM(S): at 21:29

## 2018-12-10 RX ADMIN — HYDROMORPHONE HYDROCHLORIDE 2 MILLIGRAM(S): 2 INJECTION INTRAMUSCULAR; INTRAVENOUS; SUBCUTANEOUS at 06:15

## 2018-12-10 RX ADMIN — HYDROMORPHONE HYDROCHLORIDE 2 MILLIGRAM(S): 2 INJECTION INTRAMUSCULAR; INTRAVENOUS; SUBCUTANEOUS at 15:32

## 2018-12-10 RX ADMIN — PANTOPRAZOLE SODIUM 40 MILLIGRAM(S): 20 TABLET, DELAYED RELEASE ORAL at 05:19

## 2018-12-10 RX ADMIN — Medication 300 MILLIGRAM(S): at 12:29

## 2018-12-10 RX ADMIN — ATENOLOL 50 MILLIGRAM(S): 25 TABLET ORAL at 05:18

## 2018-12-10 RX ADMIN — Medication 1000 MILLIGRAM(S): at 06:29

## 2018-12-10 RX ADMIN — HYDROMORPHONE HYDROCHLORIDE 2 MILLIGRAM(S): 2 INJECTION INTRAMUSCULAR; INTRAVENOUS; SUBCUTANEOUS at 08:24

## 2018-12-10 RX ADMIN — Medication 100 MILLIGRAM(S): at 14:17

## 2018-12-10 RX ADMIN — SENNA PLUS 2 TABLET(S): 8.6 TABLET ORAL at 21:29

## 2018-12-10 RX ADMIN — Medication 1000 MILLIGRAM(S): at 14:17

## 2018-12-10 RX ADMIN — HYDROMORPHONE HYDROCHLORIDE 2 MILLIGRAM(S): 2 INJECTION INTRAMUSCULAR; INTRAVENOUS; SUBCUTANEOUS at 16:02

## 2018-12-10 RX ADMIN — Medication 1000 MILLIGRAM(S): at 14:18

## 2018-12-10 RX ADMIN — HYDROMORPHONE HYDROCHLORIDE 2 MILLIGRAM(S): 2 INJECTION INTRAMUSCULAR; INTRAVENOUS; SUBCUTANEOUS at 12:59

## 2018-12-10 RX ADMIN — Medication 1000 MILLIGRAM(S): at 07:00

## 2018-12-10 RX ADMIN — HYDROMORPHONE HYDROCHLORIDE 2 MILLIGRAM(S): 2 INJECTION INTRAMUSCULAR; INTRAVENOUS; SUBCUTANEOUS at 08:54

## 2018-12-10 RX ADMIN — Medication 1000 MILLIGRAM(S): at 21:30

## 2018-12-10 RX ADMIN — Medication 100 MILLIGRAM(S): at 05:18

## 2018-12-10 RX ADMIN — APIXABAN 2.5 MILLIGRAM(S): 2.5 TABLET, FILM COATED ORAL at 21:29

## 2018-12-10 RX ADMIN — HYDROMORPHONE HYDROCHLORIDE 2 MILLIGRAM(S): 2 INJECTION INTRAMUSCULAR; INTRAVENOUS; SUBCUTANEOUS at 12:29

## 2018-12-10 RX ADMIN — GABAPENTIN 300 MILLIGRAM(S): 400 CAPSULE ORAL at 12:29

## 2018-12-10 NOTE — DISCHARGE NOTE ADULT - NS AS ACTIVITY OBS
Do not drive or operate machinery/Showering allowed/Do not make important decisions/No Heavy lifting/straining

## 2018-12-10 NOTE — DISCHARGE NOTE ADULT - CARE PLAN
Principal Discharge DX:	Primary osteoarthritis of right hip  Goal:	Improvement of Activities of Daily Living  Assessment and plan of treatment:	Physical Therapy /Occupational Therapy  Total Hip Protocol   - Ambulation  - Transfers   - Stairs   - ADLs (activities of daily living)    Posterior Total Hip Replacement precautions for 4 weeks  - Abduction pillow   - High hip chair for stiiting  - No internal rotation,   - No crossing legs   - No sleeping on opposite sides  - Ice packs to hip following Physical Therapy   Ice packs to hip for 30 min. every 3 hours and after physical therapy.   Keep incision clean and dry. May shower 5 days after surgery if no drainage from incision.  Prineo tape/suture removal on/near after Post Op Day # 14 in rehab facility / Surgeon's office. Principal Discharge DX:	Primary osteoarthritis of right hip  Goal:	Improvement of Activities of Daily Living  Assessment and plan of treatment:	Physical Therapy /Occupational Therapy  Total Hip Protocol   - Ambulation  - Transfers   - Stairs   - ADLs (activities of daily living)    Posterior Total Hip Replacement precautions for 4 weeks  - Abduction pillow   - High hip chair for stiiting  - No internal rotation,   - No crossing legs   - No sleeping on opposite sides  - Ice packs to hip following Physical Therapy   Ice packs to hip for 30 min. every 3 hours and after physical therapy.   Keep incision clean and dry. May shower 5 days after surgery if no drainage from incision.  Suture removal on/near after Post Op Day # 14 in rehab facility / Surgeon's office.

## 2018-12-10 NOTE — PROGRESS NOTE ADULT - ASSESSMENT
patient is 64 yo female presenting with     1. S/P Right Total hip replacement   -Cancelled staged  - Continue with pain management, DVT proph, and wound care as per Ortho.  PT/OT    2. Right Calf Pain  - mostly gone  - LE Doppler neg for DVT    3. Frequent PVCs on tele  - had ECHO with normal EF last year per cardio clearance  - last stress test was in 2016, also normal per clearance  - add on mag  - ECHO noticed  - asymptomatic  -Cardiology to follow up     4.  HTN  - continue atenolol.  Hold amlodipine.  Monitor BP     5. Gout  - continue allopurinol    6. Morbid Obesity  - weight has been stable over past year  - had PSG last year, showed mild WHIT, CPAP was not prescribed per patient  - nocturnal oxygen      Plan of care was discussed with patient  in great details, All questions were answered to their satisfaction  Seems to understand, and in agreement

## 2018-12-10 NOTE — PROGRESS NOTE ADULT - SUBJECTIVE AND OBJECTIVE BOX
Chief Complaint: Staged THR    Interval Events: No events overnight. No complaints.    Review of Systems:  General: No fevers, chills, weight loss or gain  Skin: No rashes, color changes  Cardiovascular: No chest pain, orthopnea  Respiratory: No shortness of breath, cough  Gastrointestinal: No nausea, abdominal pain  Genitourinary: No incontinence, pain with urination  Musculoskeletal: No pain, swelling, decreased range of motion  Neurological: No headache, weakness  Psychiatric: No depression, anxiety  Endocrine: No weight loss or gain, increased thirst  All other systems are comprehensively negative.    Physical Exam:  Vital Signs Last 24 Hrs  T(C): 36.9 (10 Dec 2018 08:52), Max: 37.1 (09 Dec 2018 15:05)  T(F): 98.5 (10 Dec 2018 08:52), Max: 98.8 (09 Dec 2018 15:05)  HR: 74 (10 Dec 2018 08:52) (73 - 93)  BP: 91/63 (10 Dec 2018 08:52) (91/63 - 145/82)  BP(mean): --  RR: 17 (10 Dec 2018 08:52) (14 - 17)  SpO2: 97% (10 Dec 2018 08:52) (96% - 100%)  General: NAD  HEENT: MMM  Neck: No JVD, no carotid bruit  Lungs: CTAB  CV: RRR, nl S1/S2, no M/R/G  Abdomen: S/NT/ND, +BS  Extremities: No LE edema, no cyanosis  Neuro: AAOx3, non-focal  Skin: No rash    Labs:    12-10    138  |  103  |  12  ----------------------------<  94  4.0   |  28  |  0.80    Ca    8.8      10 Dec 2018 06:47                          9.6    11.01 )-----------( 261      ( 10 Dec 2018 06:47 )             30.1

## 2018-12-10 NOTE — DISCHARGE NOTE ADULT - PATIENT PORTAL LINK FT
You can access the [facility] Patient Portal, offered by [facility], by registering with the following website: [email or web address]

## 2018-12-10 NOTE — PROGRESS NOTE ADULT - ASSESSMENT
The patient is a 63 year old female with a history of HTN, DM, lab band surgery who is admitted for staged THR.    Plan:  - ECG with no evidence of ischemia or infarction  - Intermittent PVCs - asymptomatic from an ectopy standpoint. Repeat echo with normal LV systolic function, no significant valve issues.  - Pre-syncope - likely due to dehydration and orthostatic hypotension. Improved. Hold amlodipine as BPs still remain on low side.  - Discharge planning

## 2018-12-10 NOTE — PHARMACOTHERAPY INTERVENTION NOTE - COMMENTS
Medication calendar made and was  given to patient. Patient was educated on multimodal pain management, VTE prophylaxis, management of opioid-induced constipation. Patient verbalizes understanding of each medication and its purpose. ADR were reviewed with patient.  Transitions of Care class today at the bedside. Patient was educated on self-management of postoperative care in the home setting. Patient instructed to review information and contact pharmacy or medical service with and questions. Pt understands

## 2018-12-10 NOTE — PROGRESS NOTE ADULT - SUBJECTIVE AND OBJECTIVE BOX
CC.  S/p Right Total hip replacement  HPI.  Patient reports right hip pain controlled.  Offers no other complaints          Constitutional: No fever, fatigue or weight loss.  Skin: No rash.  Eyes: No recent vision problems or eye pain.  ENT: No congestion, ear pain, or sore throat.  Endocrine: No thyroid problems.  Cardiovascular: No chest pain or palpation.  Respiratory: No cough, shortness of breath, congestion, or wheezing.  Gastrointestinal: No abdominal pain, nausea, vomiting, or diarrhea.  Genitourinary: No dysuria.  Musculoskeletal: No joint swelling.  Neurologic: No headache.      Vital Signs Last 24 Hrs  T(C): 36.9 (10 Dec 2018 08:52), Max: 37.1 (09 Dec 2018 15:05)  T(F): 98.5 (10 Dec 2018 08:52), Max: 98.8 (09 Dec 2018 15:05)  HR: 74 (10 Dec 2018 08:52) (73 - 93)  BP: 91/63 (10 Dec 2018 08:52) (91/63 - 145/82)  BP(mean): --  RR: 17 (10 Dec 2018 08:52) (14 - 17)  SpO2: 97% (10 Dec 2018 08:52) (96% - 100%)        PHYSICAL EXAM-  GENERAL: NAD, well-groomed, well-developed  HEAD:  Atraumatic, Normocephalic  EYES: EOMI, PERRLA, conjunctiva and sclera clear  NECK: Supple, No JVD, Normal thyroid  NERVOUS SYSTEM:  Alert & Oriented X3, Motor Strength 5/5 B/L upper and lower extremities; DTRs 2+ intact and symmetric  CHEST/LUNG: Clear to percussion bilaterally; No rales, rhonchi, wheezing, or rubs  HEART: Regular rate and rhythm; No murmurs, rubs, or gallops  ABDOMEN: Soft, Nontender, Nondistended; Bowel sounds present  EXTREMITIES:  2+ Peripheral Pulses, No clubbing, cyanosis, or edema  SKIN: No rashes or lesions                                  10.3   10.69 )-----------( 248      ( 09 Dec 2018 06:37 )             31.9     12-09    139  |  103  |  12  ----------------------------<  101<H>  4.0   |  32<H>  |  0.79    Ca    8.7      09 Dec 2018 06:37              PT/INR - ( 09 Dec 2018 06:37 )   PT: 12.7 sec;   INR: 1.16 ratio         PTT - ( 09 Dec 2018 06:37 )  PTT:30.2 sec        MEDICATIONS  (STANDING):  acetaminophen   Tablet .. 1000 milliGRAM(s) Oral every 8 hours  allopurinol 300 milliGRAM(s) Oral daily  ATENolol  Tablet 50 milliGRAM(s) Oral daily  docusate sodium 100 milliGRAM(s) Oral three times a day  gabapentin 300 milliGRAM(s) Oral daily  lactated ringers. 1000 milliLiter(s) (75 mL/Hr) IV Continuous <Continuous>  pantoprazole    Tablet 40 milliGRAM(s) Oral before breakfast  senna 2 Tablet(s) Oral at bedtime    MEDICATIONS  (PRN):  aluminum hydroxide/magnesium hydroxide/simethicone Suspension 30 milliLiter(s) Oral four times a day PRN Indigestion  bisacodyl Suppository 10 milliGRAM(s) Rectal daily PRN If no bowel movement by POD#2  cholestyramine Powder (Sugar-Free) 4 Gram(s) Oral daily PRN diarrhea  diphenoxylate/atropine 1 Tablet(s) Oral four times a day PRN Diarrhea  HYDROmorphone   Tablet 2 milliGRAM(s) Oral every 3 hours PRN Mild Pain (1 - 3)  HYDROmorphone   Tablet 4 milliGRAM(s) Oral every 3 hours PRN Moderate Pain (4 - 6)  HYDROmorphone  Injectable 0.5 milliGRAM(s) IV Push every 3 hours PRN Severe Pain (7 - 10)  magnesium hydroxide Suspension 30 milliLiter(s) Oral daily PRN Constipation  ondansetron Injectable 4 milliGRAM(s) IV Push every 6 hours PRN Nausea and/or Vomiting  polyethylene glycol 3350 17 Gram(s) Oral daily PRN Constipation          Imaging Personally Reviewed:     [x ] YES  [ ] NO    Consultant(s) Notes Reviewed:  [x ] YES  [ ] NO    Care Discussed with Consultants/Other Providers [x ] YES  [ ] No medical contraindication for discharge CC.  S/p Right Total hip replacement  HPI.  Patient reports right hip pain controlled.  Offers no other complaints          Constitutional: No fever, fatigue or weight loss.  Skin: No rash.  Eyes: No recent vision problems or eye pain.  ENT: No congestion, ear pain, or sore throat.  Endocrine: No thyroid problems.  Cardiovascular: No chest pain or palpation.  Respiratory: No cough, shortness of breath, congestion, or wheezing.  Gastrointestinal: No abdominal pain, nausea, vomiting, or diarrhea.  Genitourinary: No dysuria.  Musculoskeletal: No joint swelling.  Neurologic: No headache.      Vital Signs Last 24 Hrs  T(C): 36.9 (10 Dec 2018 08:52), Max: 37.1 (09 Dec 2018 15:05)  T(F): 98.5 (10 Dec 2018 08:52), Max: 98.8 (09 Dec 2018 15:05)  HR: 74 (10 Dec 2018 08:52) (73 - 93)  BP: 91/63 (10 Dec 2018 08:52) (91/63 - 145/82)  BP(mean): --  RR: 17 (10 Dec 2018 08:52) (14 - 17)  SpO2: 97% (10 Dec 2018 08:52) (96% - 100%)        PHYSICAL EXAM-  GENERAL: NAD, well-groomed, well-developed  HEAD:  Atraumatic, Normocephalic  EYES: EOMI, PERRLA, conjunctiva and sclera clear  NECK: Supple, No JVD, Normal thyroid  NERVOUS SYSTEM:  Alert & Oriented X3, Motor Strength 5/5 B/L upper and lower extremities; DTRs 2+ intact and symmetric  CHEST/LUNG: Clear to percussion bilaterally; No rales, rhonchi, wheezing, or rubs  HEART: Regular rate and rhythm; No murmurs, rubs, or gallops  ABDOMEN: Soft, Nontender, Nondistended; Bowel sounds present  EXTREMITIES:  2+ Peripheral Pulses, No clubbing, cyanosis, or edema  SKIN: No rashes or lesions                            9.6    11.01 )-----------( 261      ( 10 Dec 2018 06:47 )             30.1     12-10    138  |  103  |  12  ----------------------------<  94  4.0   |  28  |  0.80    Ca    8.8      10 Dec 2018 06:47              PT/INR - ( 09 Dec 2018 06:37 )   PT: 12.7 sec;   INR: 1.16 ratio         PTT - ( 09 Dec 2018 06:37 )  PTT:30.2 sec        Imaging Personally Reviewed:     [x ] YES  [ ] NO    Consultant(s) Notes Reviewed:  [x ] YES  [ ] NO    Care Discussed with Consultants/Other Providers [x ] YES  [ ] No medical contraindication for discharge

## 2018-12-10 NOTE — DISCHARGE NOTE ADULT - HOSPITAL COURSE
This patient was admitted to [facility] with a history of severe degenerative joint disease of the right hip.  Patient went to Pre-Surgical Testing at [facility] and was medically cleared to undergo elective procedure.  Patient had right posterior hip replacement on 12/5/18.  No operative or gail-operative complications arose during patients hospital course.  Patient was suppose to have left hip replacement also but was slowly progressing with therapy so was cancelled.  Patient received antibiotic according to SCIP guidelines for infection prevention.  Eliquis was given for DVT prophylaxis.  Anesthesia, Medical Hospitalist, Physical Therapy and Occupational Therapy were consulted. Patient is stable for discharge with a good prognosis.  Appropriate discharge instructions and medications are provided in this document. This patient was admitted to [facility] with a history of severe degenerative joint disease of the right hip.  Patient went to Pre-Surgical Testing at [facility] and was medically cleared to undergo elective procedure.  Patient had right posterior hip replacement on 12/5/18 by Dr. Gary Loew.  No operative or gail-operative complications arose during patients hospital course.  Patient was suppose to have left hip replacement also but was slowly progressing with therapy so was cancelled.  Patient received antibiotic according to SCIP guidelines for infection prevention.  Eliquis was given for DVT prophylaxis.  Anesthesia, Medical Hospitalist, Physical Therapy and Occupational Therapy were consulted. Patient is stable for discharge with a good prognosis.  Appropriate discharge instructions and medications are provided in this document.

## 2018-12-10 NOTE — DISCHARGE NOTE ADULT - MEDICATION SUMMARY - MEDICATIONS TO TAKE
I will START or STAY ON the medications listed below when I get home from the hospital:    acetaminophen 500 mg oral tablet  -- 2 tab(s) by mouth every 8 hours x 14 days  -- Indication: For Pain     HYDROmorphone 2 mg oral tablet  -- 1 tab(s) by mouth every 4 hours, As needed, Mild Pain (1 - 3)  -- Indication: For Pain    HYDROmorphone 4 mg oral tablet  -- 1 tab(s) by mouth every 4 hours, As needed, Moderate Pain (4 - 6)  -- Indication: For Pain    meloxicam 15 mg oral tablet  -- 1 tab(s) by mouth once a day  -- Indication: For Prevention of excess bone growth at surgical site    apixaban 2.5 mg oral tablet  -- 1 tab(s) by mouth every 12 hours x 35 days post-operatively  -- Indication: For Prevention of blood clots    gabapentin 300 mg oral tablet  -- 1 tab(s) by mouth once a day  -- Indication: For Pain    diphenoxylate-atropine 2.5 mg-0.025 mg oral tablet  -- 1 tab(s) by mouth 4 times a day, As Needed  -- Indication: For anti-diarrheal    allopurinol 300 mg oral tablet  -- 1 tab(s) by mouth once a day  -- Indication: For gout    cholestyramine 4 g/5 g oral powder for reconstitution  -- Indication: For high cholesterol    atenolol 50 mg oral tablet  -- 1 tab(s) by mouth once a day  -- Indication: For high blood pressure    senna oral tablet  -- 2 tab(s) by mouth once a day (at bedtime) as needed  -- Indication: For constipation    polyethylene glycol 3350 oral powder for reconstitution  -- 17 gram(s) by mouth once a day, As needed, Constipation  -- Indication: For constipation    docusate sodium 100 mg oral capsule  -- 1 cap(s) by mouth 3 times a day as needed  -- Indication: For stool softener    omeprazole 40 mg oral delayed release capsule  -- 1 cap(s) by mouth once a day  -- Indication: For Prevention of ulcers

## 2018-12-10 NOTE — DISCHARGE NOTE ADULT - MEDICATION SUMMARY - MEDICATIONS TO STOP TAKING
I will STOP taking the medications listed below when I get home from the hospital:    amLODIPine 5 mg oral tablet  -- 1 tab(s) by mouth once a day    diclofenac sodium 75 mg oral delayed release tablet  -- 1 tab(s) by mouth once a day  last dose on 8/13/18    Qsymia 15 mg-92 mg oral capsule, extended release  -- 1 cap(s) by mouth once a day (in the morning)

## 2018-12-10 NOTE — DISCHARGE NOTE ADULT - PLAN OF CARE
Improvement of Activities of Daily Living Physical Therapy /Occupational Therapy  Total Hip Protocol   - Ambulation  - Transfers   - Stairs   - ADLs (activities of daily living)    Posterior Total Hip Replacement precautions for 4 weeks  - Abduction pillow   - High hip chair for stiiting  - No internal rotation,   - No crossing legs   - No sleeping on opposite sides  - Ice packs to hip following Physical Therapy   Ice packs to hip for 30 min. every 3 hours and after physical therapy.   Keep incision clean and dry. May shower 5 days after surgery if no drainage from incision.  Prineo tape/suture removal on/near after Post Op Day # 14 in rehab facility / Surgeon's office. Physical Therapy /Occupational Therapy  Total Hip Protocol   - Ambulation  - Transfers   - Stairs   - ADLs (activities of daily living)    Posterior Total Hip Replacement precautions for 4 weeks  - Abduction pillow   - High hip chair for stiiting  - No internal rotation,   - No crossing legs   - No sleeping on opposite sides  - Ice packs to hip following Physical Therapy   Ice packs to hip for 30 min. every 3 hours and after physical therapy.   Keep incision clean and dry. May shower 5 days after surgery if no drainage from incision.  Suture removal on/near after Post Op Day # 14 in rehab facility / Surgeon's office.

## 2018-12-10 NOTE — PROGRESS NOTE ADULT - SUBJECTIVE AND OBJECTIVE BOX
ORTHOPEDIC PA PROGRESS NOTE  KAREN PETERSON      63y Female                                                                                                                               POD #  5      STATUS POST:               Pre-Op Dx: DJD (degenerative joint disease) of pelvis: Right    Post-Op Dx:  DJD (degenerative joint disease) of pelvis: Right    Procedure: Hip replacement: Right                                                Pain (0-10):  Pt reports moderate pain but improving. Pain medication is helping. Pt denies any CP, SOB, fever, numbness/tingling. Pt is positive void.   Current Pain Management:  [ x] Po Analgesics [ x] IM /IV Analgesics     T(F): --  HR: 93  BP: 145/82  RR: --  SpO2: --                         9.6    11.01 )-----------( 261      ( 10 Dec 2018 06:47 )             30.1         12-10    138  |  103  |  12  ----------------------------<  94  4.0   |  28  |  0.80    Ca    8.8      10 Dec 2018 06:47      Physical Exam :    -   Dressing  C/D/I.   -   Distal Neurvascular status intact grossly.   -   Warm well perfused; capillary refill <3 seconds   -   (+)EHL/FHL   -   (+) Sensation to light touch  -   (-) Calf tenderness Bilaterally    A/P: 63y Female s/p Hip replacement: Right     -   Continue PT/OT  -   Pain control   -   Medicine to follow  -   DVT ppx:     [ x]SCDs        [x ] Eliquis     -   Weight bearing status:  WBAT   -  Dispo:  Rehab when medically cleared

## 2018-12-10 NOTE — DISCHARGE NOTE ADULT - INSTRUCTIONS
For Constipation :   • Increase your water intake. Drink at least 8 glasses of water daily.  • Try adding fiber to your diet by eating fruits, vegetables and foods that are rich in grains.  • If you do experience constipation, you may take an over-the-counter stool softener/laxative such as Nu Colace, Senekot or  Milk of Magnesia.

## 2018-12-10 NOTE — DISCHARGE NOTE ADULT - CARE PROVIDERS DIRECT ADDRESSES
[email or web address]

## 2018-12-11 VITALS
RESPIRATION RATE: 18 BRPM | SYSTOLIC BLOOD PRESSURE: 109 MMHG | HEART RATE: 80 BPM | DIASTOLIC BLOOD PRESSURE: 69 MMHG | OXYGEN SATURATION: 100 % | TEMPERATURE: 98 F

## 2018-12-11 PROCEDURE — 80048 BASIC METABOLIC PNL TOTAL CA: CPT

## 2018-12-11 PROCEDURE — 36415 COLL VENOUS BLD VENIPUNCTURE: CPT

## 2018-12-11 PROCEDURE — 86901 BLOOD TYPING SEROLOGIC RH(D): CPT

## 2018-12-11 PROCEDURE — 85014 HEMATOCRIT: CPT

## 2018-12-11 PROCEDURE — 88305 TISSUE EXAM BY PATHOLOGIST: CPT

## 2018-12-11 PROCEDURE — 93970 EXTREMITY STUDY: CPT

## 2018-12-11 PROCEDURE — 88311 DECALCIFY TISSUE: CPT

## 2018-12-11 PROCEDURE — 97530 THERAPEUTIC ACTIVITIES: CPT

## 2018-12-11 PROCEDURE — 97110 THERAPEUTIC EXERCISES: CPT

## 2018-12-11 PROCEDURE — 97161 PT EVAL LOW COMPLEX 20 MIN: CPT

## 2018-12-11 PROCEDURE — 85018 HEMOGLOBIN: CPT

## 2018-12-11 PROCEDURE — 85730 THROMBOPLASTIN TIME PARTIAL: CPT

## 2018-12-11 PROCEDURE — C1713: CPT

## 2018-12-11 PROCEDURE — 97116 GAIT TRAINING THERAPY: CPT

## 2018-12-11 PROCEDURE — 85027 COMPLETE CBC AUTOMATED: CPT

## 2018-12-11 PROCEDURE — 86900 BLOOD TYPING SEROLOGIC ABO: CPT

## 2018-12-11 PROCEDURE — 85048 AUTOMATED LEUKOCYTE COUNT: CPT

## 2018-12-11 PROCEDURE — C1776: CPT

## 2018-12-11 PROCEDURE — 73502 X-RAY EXAM HIP UNI 2-3 VIEWS: CPT

## 2018-12-11 PROCEDURE — 94664 DEMO&/EVAL PT USE INHALER: CPT

## 2018-12-11 PROCEDURE — C1889: CPT

## 2018-12-11 PROCEDURE — 86850 RBC ANTIBODY SCREEN: CPT

## 2018-12-11 PROCEDURE — 93306 TTE W/DOPPLER COMPLETE: CPT

## 2018-12-11 PROCEDURE — 83735 ASSAY OF MAGNESIUM: CPT

## 2018-12-11 PROCEDURE — 99232 SBSQ HOSP IP/OBS MODERATE 35: CPT

## 2018-12-11 PROCEDURE — 85610 PROTHROMBIN TIME: CPT

## 2018-12-11 PROCEDURE — 97165 OT EVAL LOW COMPLEX 30 MIN: CPT

## 2018-12-11 PROCEDURE — 82962 GLUCOSE BLOOD TEST: CPT

## 2018-12-11 RX ORDER — MELOXICAM 15 MG/1
1 TABLET ORAL
Qty: 0 | Refills: 0 | COMMUNITY
Start: 2018-12-11

## 2018-12-11 RX ORDER — MELOXICAM 15 MG/1
15 TABLET ORAL DAILY
Qty: 0 | Refills: 0 | Status: DISCONTINUED | OUTPATIENT
Start: 2018-12-11 | End: 2018-12-11

## 2018-12-11 RX ADMIN — HYDROMORPHONE HYDROCHLORIDE 2 MILLIGRAM(S): 2 INJECTION INTRAMUSCULAR; INTRAVENOUS; SUBCUTANEOUS at 06:04

## 2018-12-11 RX ADMIN — HYDROMORPHONE HYDROCHLORIDE 2 MILLIGRAM(S): 2 INJECTION INTRAMUSCULAR; INTRAVENOUS; SUBCUTANEOUS at 12:18

## 2018-12-11 RX ADMIN — Medication 1000 MILLIGRAM(S): at 05:12

## 2018-12-11 RX ADMIN — HYDROMORPHONE HYDROCHLORIDE 2 MILLIGRAM(S): 2 INJECTION INTRAMUSCULAR; INTRAVENOUS; SUBCUTANEOUS at 08:58

## 2018-12-11 RX ADMIN — HYDROMORPHONE HYDROCHLORIDE 2 MILLIGRAM(S): 2 INJECTION INTRAMUSCULAR; INTRAVENOUS; SUBCUTANEOUS at 11:57

## 2018-12-11 RX ADMIN — PANTOPRAZOLE SODIUM 40 MILLIGRAM(S): 20 TABLET, DELAYED RELEASE ORAL at 05:13

## 2018-12-11 RX ADMIN — Medication 300 MILLIGRAM(S): at 11:30

## 2018-12-11 RX ADMIN — GABAPENTIN 300 MILLIGRAM(S): 400 CAPSULE ORAL at 11:30

## 2018-12-11 RX ADMIN — Medication 100 MILLIGRAM(S): at 05:13

## 2018-12-11 RX ADMIN — HYDROMORPHONE HYDROCHLORIDE 2 MILLIGRAM(S): 2 INJECTION INTRAMUSCULAR; INTRAVENOUS; SUBCUTANEOUS at 05:13

## 2018-12-11 RX ADMIN — Medication 1000 MILLIGRAM(S): at 05:15

## 2018-12-11 RX ADMIN — APIXABAN 2.5 MILLIGRAM(S): 2.5 TABLET, FILM COATED ORAL at 08:58

## 2018-12-11 RX ADMIN — HYDROMORPHONE HYDROCHLORIDE 2 MILLIGRAM(S): 2 INJECTION INTRAMUSCULAR; INTRAVENOUS; SUBCUTANEOUS at 09:50

## 2018-12-11 RX ADMIN — MELOXICAM 15 MILLIGRAM(S): 15 TABLET ORAL at 11:52

## 2018-12-11 RX ADMIN — ATENOLOL 50 MILLIGRAM(S): 25 TABLET ORAL at 05:12

## 2018-12-11 RX ADMIN — MELOXICAM 15 MILLIGRAM(S): 15 TABLET ORAL at 11:30

## 2018-12-11 NOTE — PROGRESS NOTE ADULT - SUBJECTIVE AND OBJECTIVE BOX
Chief Complaint: THR    Interval Events: No events overnight. No complaints.    Review of Systems:  General: No fevers, chills, weight loss or gain  Skin: No rashes, color changes  Cardiovascular: No chest pain, orthopnea  Respiratory: No shortness of breath, cough  Gastrointestinal: No nausea, abdominal pain  Genitourinary: No incontinence, pain with urination  Musculoskeletal: No pain, swelling, decreased range of motion  Neurological: No headache, weakness  Psychiatric: No depression, anxiety  Endocrine: No weight loss or gain, increased thirst  All other systems are comprehensively negative.    Physical Exam:  Vital Signs Last 24 Hrs  T(C): 36.8 (11 Dec 2018 07:22), Max: 37.2 (10 Dec 2018 23:03)  T(F): 98.3 (11 Dec 2018 07:22), Max: 99 (10 Dec 2018 23:03)  HR: 75 (11 Dec 2018 07:22) (68 - 86)  BP: 96/66 (11 Dec 2018 07:22) (96/66 - 126/76)  BP(mean): --  RR: 16 (11 Dec 2018 07:22) (14 - 17)  SpO2: 96% (11 Dec 2018 07:22) (96% - 99%)  General: NAD  HEENT: MMM  Neck: No JVD, no carotid bruit  Lungs: CTAB  CV: RRR, nl S1/S2, no M/R/G  Abdomen: S/NT/ND, +BS  Extremities: No LE edema, no cyanosis  Neuro: AAOx3, non-focal  Skin: No rash    Labs:    12-10    138  |  103  |  12  ----------------------------<  94  4.0   |  28  |  0.80    Ca    8.8      10 Dec 2018 06:47                          9.6    11.01 )-----------( 261      ( 10 Dec 2018 06:47 )             30.1

## 2018-12-11 NOTE — DIETITIAN INITIAL EVALUATION ADULT. - PERTINENT MEDS FT
eliquis, lactated ringers IV, dialudid, dulcolax, tylenol, zofran, protonix, miralax, senna, colace, neurontin, tenormin

## 2018-12-11 NOTE — PROGRESS NOTE ADULT - SUBJECTIVE AND OBJECTIVE BOX
POST OPERATIVE DAY #:  [6 ]   STATUS POST: [ ] Left [x ] Right  [ ]TKR [ x]THR                        Patient is a 63y old  Female who presents with a chief complaint of S/P Right Total hip replacement.  Staged (10 Dec 2018 11:14)      Pain well controlled    OBJECTIVE:     Vital Signs Last 24 Hrs  T(C): 37.2 (10 Dec 2018 23:03), Max: 37.2 (10 Dec 2018 23:03)  T(F): 99 (10 Dec 2018 23:03), Max: 99 (10 Dec 2018 23:03)  HR: 86 (11 Dec 2018 05:11) (68 - 86)  BP: 119/71 (11 Dec 2018 05:11) (91/63 - 126/76)  BP(mean): --  RR: 14 (10 Dec 2018 23:03) (14 - 17)  SpO2: 98% (10 Dec 2018 23:03) (97% - 99%)    Affected extremity:          wound  clean/dry/intact w sutures          Sensation; intact to light touch           Motor exam: Toes warm and mobile well perfused;  +capillary refill         No calf tenderness bilateral LE's    LABS:                        9.6    11.01 )-----------( 261      ( 10 Dec 2018 06:47 )             30.1     12-10    138  |  103  |  12  ----------------------------<  94  4.0   |  28  |  0.80    Ca    8.8      10 Dec 2018 06:47              A/P :    -    Analgesics PRN  -    DVT ppx: [ ]ASA 81 bid [ ] Lovenox [ ] Coumadin   [ x] Eliquis   -    Check AM labs  -    Weight bearing status: WBAT [ x]        PWB    [ ]     TTWB  [ ]      NWB  [ ]  -    Physical Therapy  -    Occupational Therapy  -    Dispo:       JESSY [ x]    pending authorization

## 2018-12-11 NOTE — PROGRESS NOTE ADULT - PROVIDER SPECIALTY LIST ADULT
Cardiology
Hospitalist
Orthopedics
Hospitalist

## 2018-12-11 NOTE — PROGRESS NOTE ADULT - REASON FOR ADMISSION
Severe osteoarthritis bilateral hips
Patient is a 63y old  Female who presents with a chief complaint of Patient is a 63y old  Female who presents with a chief complaint of Severe osteoarthritis bilateral hips (06 Dec 2018 10:26) (06 Dec 2018 10:57)
Patient is a 63y old  Female who presents with a chief complaint of Severe osteoarthritis bilateral hips (06 Dec 2018 10:26)
S/P Right Total hip replacement.  Staged

## 2018-12-11 NOTE — PROGRESS NOTE ADULT - NSHPATTENDINGPLANDISCUSS_GEN_ALL_CORE
patient at bedside , RN , Ortho PA.
patient at bedside

## 2018-12-11 NOTE — DIETITIAN INITIAL EVALUATION ADULT. - NS AS NUTRI INTERV MEALS SNACK
1. No change in heart medicines  2. Follow up with me in 3 months  3.  Plan to see Dr. Hayley Delgado in 6 months General/healthful diet

## 2018-12-11 NOTE — DIETITIAN INITIAL EVALUATION ADULT. - OTHER INFO
64 yo female seen per policy for LOS; Regular diet is well toelrated and she makes her needs known; NKFA; preferences reviewed; pt is s/p staged Darrel TKR and plans to go to rehab following d/c; she shops and cooks for self, with daughter involvement; pt provided verbal wt loss tips and strategies with planned goal wt of < 180#; pt verbalized understanding of the same; PMH to follow:

## 2018-12-11 NOTE — DIETITIAN INITIAL EVALUATION ADULT. - NS AS NUTRI INTERV ED CONTENT
Priority modifications/Purpose of the nutrition education/Nutrition relationship to health/disease/Recommended modifications/Survival information

## 2018-12-11 NOTE — PROGRESS NOTE ADULT - SUBJECTIVE AND OBJECTIVE BOX
CC.  S/p Right Total hip replacement  HPI.  Patient reports right hip pain controlled.  Offers no other complaints          Constitutional: No fever, fatigue or weight loss.  Skin: No rash.  Eyes: No recent vision problems or eye pain.  ENT: No congestion, ear pain, or sore throat.  Endocrine: No thyroid problems.  Cardiovascular: No chest pain or palpation.  Respiratory: No cough, shortness of breath, congestion, or wheezing.  Gastrointestinal: No abdominal pain, nausea, vomiting, or diarrhea.  Genitourinary: No dysuria.  Musculoskeletal: No joint swelling.  Neurologic: No headache.      Vital Signs Last 24 Hrs  T(C): 36.8 (11 Dec 2018 07:22), Max: 37.2 (10 Dec 2018 23:03)  T(F): 98.3 (11 Dec 2018 07:22), Max: 99 (10 Dec 2018 23:03)  HR: 75 (11 Dec 2018 07:22) (68 - 86)  BP: 96/66 (11 Dec 2018 07:22) (96/66 - 126/76)  BP(mean): --  RR: 16 (11 Dec 2018 07:22) (14 - 17)  SpO2: 96% (11 Dec 2018 07:22) (96% - 99%)      PHYSICAL EXAM-  GENERAL: NAD, well-groomed, well-developed  HEAD:  Atraumatic, Normocephalic  EYES: EOMI, PERRLA, conjunctiva and sclera clear  NECK: Supple, No JVD, Normal thyroid  NERVOUS SYSTEM:  Alert & Oriented X3, Motor Strength 5/5 B/L upper and lower extremities; DTRs 2+ intact and symmetric  CHEST/LUNG: Clear to percussion bilaterally; No rales, rhonchi, wheezing, or rubs  HEART: Regular rate and rhythm; No murmurs, rubs, or gallops  ABDOMEN: Soft, Nontender, Nondistended; Bowel sounds present  EXTREMITIES:  2+ Peripheral Pulses, No clubbing, cyanosis, or edema  SKIN: No rashes or lesions                                 9.6    11.01 )-----------( 261      ( 10 Dec 2018 06:47 )             30.1     12-10    138  |  103  |  12  ----------------------------<  94  4.0   |  28  |  0.80    Ca    8.8      10 Dec 2018 06:47                        PT/INR - ( 09 Dec 2018 06:37 )   PT: 12.7 sec;   INR: 1.16 ratio         PTT - ( 09 Dec 2018 06:37 )  PTT:30.2 sec        Imaging Personally Reviewed:     [x ] YES  [ ] NO    Consultant(s) Notes Reviewed:  [x ] YES  [ ] NO    Care Discussed with Consultants/Other Providers [x ] YES  [ ] No medical contraindication for discharge

## 2018-12-13 NOTE — ED PROVIDER NOTE - RADIATION
HEARING AID CHECK    AUDIOLOGY/HEARING AIDS:   Oticon OPN1 mini [facility] aids (fit 9/25/2018)  size 2 85 receivers with ear   8mm bass double domes and pro wax mini fit filters  312 battery    repair, replacement, services and supplies covered until 10/13/2019    Last Zhang  9/26/2018    SUBJECTIVE:  Reason for visit: Patient complains of feedback sensitivity. We have discussed this before and I recommended custom molds which patient refuses. She would like for me to attempt to adjust aids to reduce feedback sensitivity but does not want to compromise how well she hears with the aids.     OBJECTIVE:  Services provided:   Dispensed supply: 10mm bass single domes  Replaced Hearing Aid Parts: wax filter  Physical Modifications: Attempted to refit using  tips, double dome etc. in order to control feedback wiithout much improvement. Ultimately switched to 10 mm single bass dome.  Programming:  Both: Made gain decreases for soft high frequencies, 6-8 Khz band and from 2-4k Hz for moderate inputs.     ASSESSMENT:  Feedback sensitivity somewhat improved. Patient to try; she is aware that changes might impact how well she was hearing. Again emphasized that custom molds are most appropriate but patient declined.     FOLLOW-UP:  Return as needed.   
no radiation

## 2018-12-24 ENCOUNTER — APPOINTMENT (OUTPATIENT)
Dept: ORTHOPEDIC SURGERY | Facility: CLINIC | Age: 63
End: 2018-12-24
Payer: MEDICARE

## 2018-12-24 VITALS
HEIGHT: 64 IN | WEIGHT: 278 LBS | SYSTOLIC BLOOD PRESSURE: 123 MMHG | BODY MASS INDEX: 47.46 KG/M2 | HEART RATE: 69 BPM | DIASTOLIC BLOOD PRESSURE: 82 MMHG

## 2018-12-24 PROCEDURE — 73502 X-RAY EXAM HIP UNI 2-3 VIEWS: CPT

## 2018-12-24 PROCEDURE — 99024 POSTOP FOLLOW-UP VISIT: CPT

## 2018-12-24 RX ORDER — DICLOFENAC SODIUM 100 MG/1
TABLET, FILM COATED, EXTENDED RELEASE ORAL
Refills: 0 | Status: DISCONTINUED | COMMUNITY
End: 2018-12-24

## 2018-12-24 RX ORDER — SENNOSIDES 8.6 MG TABLETS 8.6 MG/1
TABLET ORAL
Refills: 0 | Status: ACTIVE | COMMUNITY

## 2018-12-24 RX ORDER — DIPHENOXYLATE HYDROCHLORIDE AND ATROPINE SULFATE 2.5; .025 MG/1; MG/1
2.5-0.025 TABLET ORAL
Refills: 0 | Status: DISCONTINUED | COMMUNITY
End: 2018-12-24

## 2019-02-05 ENCOUNTER — APPOINTMENT (OUTPATIENT)
Dept: ORTHOPEDIC SURGERY | Facility: CLINIC | Age: 64
End: 2019-02-05
Payer: MEDICARE

## 2019-02-05 VITALS — WEIGHT: 292 LBS | BODY MASS INDEX: 50.12 KG/M2

## 2019-02-05 VITALS — SYSTOLIC BLOOD PRESSURE: 160 MMHG | HEIGHT: 64 IN | HEART RATE: 73 BPM | DIASTOLIC BLOOD PRESSURE: 91 MMHG

## 2019-02-05 PROCEDURE — 73502 X-RAY EXAM HIP UNI 2-3 VIEWS: CPT

## 2019-02-05 PROCEDURE — 99024 POSTOP FOLLOW-UP VISIT: CPT

## 2019-02-05 NOTE — HISTORY OF PRESENT ILLNESS
[4] : the patient reports pain that is 4/10 in severity [Chills] : no chills [Constipation] : no constipation [Diarrhea] : no diarrhea [Dysuria] : no dysuria [Fever] : no fever [Nausea] : no nausea [Vomiting] : no vomiting [Clean/Dry/Intact] : clean, dry and intact [Healed] : healed [Erythema] : not erythematous [Discharge] : absent of discharge [Swelling] : swollen [Dehiscence] : not dehisced [Vascular Intact] : ~T peripheral vascular exam normal [Negative Chema's] : maneuvers demonstrated a negative Chema's sign [Hardware in Good Position] : hardware in good position [No Obvious Fractures] : no obvious fractures [Good Overall Alignment] : good overall alignment [de-identified] : December 5, 2018 right total hip replacement, severe DJD left hip [de-identified] : The patient is a 64-year-old female who presents today for followup of her right hip. She status post a right total hip replacement on December 5, 2018. She is doing home physical therapy. She is in good with a rolling walker. She is not driving. The patient has severe DJD to the left hip requiring imminent surgery for which he like a book April or May of 2019 [de-identified] : Well-healedSurgical scar to the right hip with erythema, drainage, or evidence cellulitis. Patient can flex the hip to approximately 100° with internal actual about 30° without pain, discomfort, or instability. There is no rotation of the left hip due to her intense pain and discomfort. Patient has good motor and sensory both of her legs. Calves are soft, nontender, no evidence of DVT at this time the [de-identified] : Right total hip replacement, grossly in anatomical alignment, excellent bone to prosthesis interface, there is no gross evidence of prosthetic failure, on component anatomically aligned. There is no subsidence of the femoral stem. There is no periprosthetic fracture. There is no heterotopic ossification.\par \par \par Severe DJD to the left hip with multiple subchondral cysts, periarticular osteophytes, bone-on-bone contact acetabulum and the femoral head with some subluxation superolaterally and some collapse of the femoral [de-identified] : Stable postoperative course of a right total hip replacement, severe DJD left hip [de-identified] : Patient will continue an exercise program of walking, and strengthening. She will continue with physical therapy. The patient would like to book left total hip replacement surgery sometime in April and May of 2019 and she was educated once again the process. All of her questions were answered to her satisfaction

## 2019-02-06 ENCOUNTER — NON-APPOINTMENT (OUTPATIENT)
Age: 64
End: 2019-02-06

## 2019-02-06 ENCOUNTER — APPOINTMENT (OUTPATIENT)
Dept: BARIATRICS/WEIGHT MGMT | Facility: CLINIC | Age: 64
End: 2019-02-06
Payer: MEDICARE

## 2019-02-06 VITALS
DIASTOLIC BLOOD PRESSURE: 98 MMHG | OXYGEN SATURATION: 98 % | HEART RATE: 76 BPM | HEIGHT: 61 IN | BODY MASS INDEX: 54.56 KG/M2 | SYSTOLIC BLOOD PRESSURE: 140 MMHG | WEIGHT: 289 LBS

## 2019-02-06 PROCEDURE — 99205 OFFICE O/P NEW HI 60 MIN: CPT | Mod: 25

## 2019-02-06 PROCEDURE — 94690 O2 UPTK REST INDIRECT: CPT

## 2019-02-06 PROCEDURE — 93000 ELECTROCARDIOGRAM COMPLETE: CPT

## 2019-02-08 NOTE — HISTORY OF PRESENT ILLNESS
[FreeTextEntry1] : 63 yo F s/p band removal March 2017 BMI 54 wt 289lb referred for weight management evaluation.  PMHx: HTN, DM2 diet control and HLD unable to tolerate statin due to myalgias, stem for overactive bladder\par \par Goal: lose prior to left hip replacement April 2019\par Rt  hip replacement 12/2018\par \par Weight hx: Hurt back at work and had weight gain w/ food as a comfort.  At 350lb got band placement 2007 to lose prior to hip placement.  Lost 50lb down to 300lb, removed 2017 and had only gained 5lb.  Did not have sleeve due to scar tissue.  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  Working w/ PT.\par Sleep: mild sleep apnea dx 2016, no cpap required\par sleeps mostly during the day, 2-3hr sleep strait through\par \par Meals are not regimented\par B: 10a protein shake, yogurt\par Skips lunch or have salad/oatmeal\par L/D: 1p, 6p sushi, soup, no red meat. veggies\par Sn: crackers\par some night time eating grilled chicken after waking from nap\par

## 2019-02-08 NOTE — PHYSICAL EXAM
[Obese] : obese [Normal] : affect appropriate [de-identified] : Mallampati 3 [de-identified] : large neck circumference [de-identified] : central adiposity [de-identified] : walks w/ rollator

## 2019-02-08 NOTE — REASON FOR VISIT
[Initial Consultation] : an initial consultation for [Obesity] : obesity [Diabetes Mellitus] : diabetes mellitus [Hyperlipidemia] : hyperlipidemia [Hypertension] : hypertension [FreeTextEntry2] : insomnia w/ cpap

## 2019-02-08 NOTE — ASSESSMENT
[FreeTextEntry1] : 65 yo F s/p band removal March 2017 BMI 54 wt 289lb referred for weight management evaluation.  PMHx: HTN, DM2 diet control and HLD unable to tolerate statin due to myalgias, stem for overactive bladder\par \par Plan:\par Extensive need for regulation of circadian rhythm\par Regiment meals: 9am breakfast, no later than 7pm dinner w/ no daytime naps\par Discussed sleep hygiene and 11pm bedtime\par Cont PT for joint stability and mobility\par Start phentermine 15 q morning, topiramate 25mg qhs\par Will look into coverage of GLP1-agonist\par Cont to work w/ nutritionist for dietary management\par Metabolic labs today\par \par RTC 2wk

## 2019-02-13 ENCOUNTER — CHART COPY (OUTPATIENT)
Age: 64
End: 2019-02-13

## 2019-02-19 LAB
25(OH)D3 SERPL-MCNC: 31.4 NG/ML
ALBUMIN SERPL ELPH-MCNC: 4.3 G/DL
ALP BLD-CCNC: 94 U/L
ALT SERPL-CCNC: 14 U/L
ANION GAP SERPL CALC-SCNC: 12 MMOL/L
AST SERPL-CCNC: 24 U/L
BASOPHILS # BLD AUTO: 0.03 K/UL
BASOPHILS NFR BLD AUTO: 0.3 %
BILIRUB SERPL-MCNC: 0.4 MG/DL
BUN SERPL-MCNC: 13 MG/DL
CALCIUM SERPL-MCNC: 10.1 MG/DL
CHLORIDE SERPL-SCNC: 101 MMOL/L
CHOLEST SERPL-MCNC: 178 MG/DL
CHOLEST/HDLC SERPL: 4.3 RATIO
CO2 SERPL-SCNC: 26 MMOL/L
CREAT SERPL-MCNC: 0.86 MG/DL
CRP SERPL HS-MCNC: 26.9 MG/L
EOSINOPHIL # BLD AUTO: 0.25 K/UL
EOSINOPHIL NFR BLD AUTO: 2.8 %
GLUCOSE SERPL-MCNC: 88 MG/DL
HBA1C MFR BLD HPLC: 5.8 %
HCT VFR BLD CALC: 42.1 %
HDLC SERPL-MCNC: 41 MG/DL
HGB BLD-MCNC: 13 G/DL
IMM GRANULOCYTES NFR BLD AUTO: 0.2 %
INSULIN SERPL-MCNC: 18.1 UU/ML
LDLC SERPL CALC-MCNC: 115 MG/DL
LYMPHOCYTES # BLD AUTO: 3.82 K/UL
LYMPHOCYTES NFR BLD AUTO: 43.3 %
MAN DIFF?: NORMAL
MCHC RBC-ENTMCNC: 28.2 PG
MCHC RBC-ENTMCNC: 30.9 GM/DL
MCV RBC AUTO: 91.3 FL
MONOCYTES # BLD AUTO: 0.61 K/UL
MONOCYTES NFR BLD AUTO: 6.9 %
NEUTROPHILS # BLD AUTO: 4.09 K/UL
NEUTROPHILS NFR BLD AUTO: 46.5 %
PLATELET # BLD AUTO: 340 K/UL
POTASSIUM SERPL-SCNC: 4 MMOL/L
PROT SERPL-MCNC: 8.3 G/DL
RBC # BLD: 4.61 M/UL
RBC # FLD: 14.3 %
SODIUM SERPL-SCNC: 139 MMOL/L
T3FREE SERPL-MCNC: 2.83 PG/ML
T4 FREE SERPL-MCNC: 1.3 NG/DL
TRIGL SERPL-MCNC: 111 MG/DL
TSH SERPL-ACNC: 2.78 UIU/ML
WBC # FLD AUTO: 8.82 K/UL

## 2019-02-22 ENCOUNTER — APPOINTMENT (OUTPATIENT)
Dept: UROLOGY | Facility: CLINIC | Age: 64
End: 2019-02-22
Payer: MEDICARE

## 2019-02-22 ENCOUNTER — OUTPATIENT (OUTPATIENT)
Dept: OUTPATIENT SERVICES | Facility: HOSPITAL | Age: 64
LOS: 1 days | End: 2019-02-22
Payer: COMMERCIAL

## 2019-02-22 DIAGNOSIS — Z90.710 ACQUIRED ABSENCE OF BOTH CERVIX AND UTERUS: Chronic | ICD-10-CM

## 2019-02-22 DIAGNOSIS — Z98.89 OTHER SPECIFIED POSTPROCEDURAL STATES: Chronic | ICD-10-CM

## 2019-02-22 DIAGNOSIS — Z98.42 CATARACT EXTRACTION STATUS, LEFT EYE: Chronic | ICD-10-CM

## 2019-02-22 DIAGNOSIS — N32.81 OVERACTIVE BLADDER: Chronic | ICD-10-CM

## 2019-02-22 DIAGNOSIS — R35.0 FREQUENCY OF MICTURITION: ICD-10-CM

## 2019-02-22 DIAGNOSIS — Z87.442 PERSONAL HISTORY OF URINARY CALCULI: Chronic | ICD-10-CM

## 2019-02-22 PROCEDURE — 99213 OFFICE O/P EST LOW 20 MIN: CPT | Mod: 25

## 2019-02-22 PROCEDURE — 76775 US EXAM ABDO BACK WALL LIM: CPT | Mod: 26

## 2019-02-22 PROCEDURE — 76775 US EXAM ABDO BACK WALL LIM: CPT

## 2019-02-22 NOTE — ADDENDUM
[FreeTextEntry1] :  I, Tess Melo, acted solely as a scribe for Dr. Jagdeep Watts. The documentation recorded by the scribe accurately reflects the service I personally performed and the decision by me.\par

## 2019-02-22 NOTE — HISTORY OF PRESENT ILLNESS
[FreeTextEntry1] : 64 year old female presents for evaluation of test results.\par She had the lap-band removed in March 2017 and was unable to have to sleeve gastrectomy previously.\par Pt recently had right hip replacement done and will be having left hip replacement performed soon.\par Denies any obstructive voiding symptoms.\par US today reviewed shows twinkle effect in lower pole of right kidney but no obvious stone.\par LL from 02/05/19 shows low output still and increased Na levels on this occasion.\par Advised pt to continue drinking more and to reduce salt intake.\par Advised pt to lose some weight, which pt will do more of after her left hip replacement.\par LL in 5 months and RTO in 6 months for results and US\par

## 2019-02-22 NOTE — ASSESSMENT
[FreeTextEntry1] : Advised pt to continue drinking more and to reduce salt intake.\par Advised pt to lose some weight, which pt will do more of after her left hip replacement.\par LL in 5 months and RTO in 6 months for results and US

## 2019-02-25 ENCOUNTER — APPOINTMENT (OUTPATIENT)
Dept: BARIATRICS/WEIGHT MGMT | Facility: CLINIC | Age: 64
End: 2019-02-25
Payer: MEDICARE

## 2019-02-25 VITALS
SYSTOLIC BLOOD PRESSURE: 120 MMHG | HEIGHT: 61 IN | OXYGEN SATURATION: 93 % | WEIGHT: 284 LBS | DIASTOLIC BLOOD PRESSURE: 80 MMHG | BODY MASS INDEX: 53.62 KG/M2 | HEART RATE: 88 BPM

## 2019-02-25 PROCEDURE — 99214 OFFICE O/P EST MOD 30 MIN: CPT

## 2019-02-26 NOTE — REASON FOR VISIT
[Follow-Up Visit] : a follow-up visit for [Obesity] : obesity [Diabetes Mellitus] : diabetes mellitus [Hyperlipidemia] : hyperlipidemia [Hypertension] : hypertension

## 2019-02-26 NOTE — HISTORY OF PRESENT ILLNESS
[FreeTextEntry1] : 63 yo F s/p band removal March 2017 w/ BMI 53, HTN, DM2, HLD and overactive baldder presents for weight management f/u.\par \par 5lb weight loss since last visit.\par Goal is for weight loss prior to left hip replacement in April.\par \par No change in appetite w/ Phentermine 15mg\par Cont to have nighttime waking which pt attributes to having an overactive bladder.\par Attempting to decrease daytime naps.\par Tolerating increased of Topamax to 50mg.

## 2019-02-26 NOTE — PHYSICAL EXAM
[Obese] : obese [Normal] : affect appropriate [de-identified] : Mallampati 3 [de-identified] : large neck circumference [de-identified] : central adiposity [de-identified] : walks w/ rollator

## 2019-02-26 NOTE — ASSESSMENT
[FreeTextEntry1] : 63 yo F s/p band removal March 2017 w/ BMI 53, HTN, DM2, HLD and overactive baldder presents for weight management f/u.\par \par Plan:\par Cont Phentermine 15mg, Topimax 50mg\par Start Bydureon 2mg\par Cont to work w/ PT for ambulation stability\par Reinforced need for sleep regulation w/ goal of 6hr or more sleep nightly\par \par RTC 2wk

## 2019-03-06 DIAGNOSIS — N20.0 CALCULUS OF KIDNEY: ICD-10-CM

## 2019-03-11 ENCOUNTER — APPOINTMENT (OUTPATIENT)
Dept: BARIATRICS/WEIGHT MGMT | Facility: CLINIC | Age: 64
End: 2019-03-11
Payer: MEDICARE

## 2019-03-11 VITALS
HEART RATE: 98 BPM | BODY MASS INDEX: 53.81 KG/M2 | WEIGHT: 285 LBS | OXYGEN SATURATION: 99 % | SYSTOLIC BLOOD PRESSURE: 140 MMHG | DIASTOLIC BLOOD PRESSURE: 84 MMHG | HEIGHT: 61 IN

## 2019-03-11 PROCEDURE — 99214 OFFICE O/P EST MOD 30 MIN: CPT

## 2019-03-11 NOTE — ASSESSMENT
[FreeTextEntry1] : 63 yo F s/p band removal March 2017 w/ BMI 53, HTN, DM2, HLD and overactive bladder presents for weight management f/u.\par Weight relatively unchanged since last visit w/ fat loss.\par \par Plan:\par Cont w/ whole food approach not eating after 7pm\par Cont to work w/ PT increasing activity as tolerated\par Reinforced need for normal sleep patter for metabolic regulation and weight loss.  Inc Topiramate 100mg\par Cont Trulicity, risks discussed in great detail.  Increase as tolerated\par D/c Phentermine\par \par RTC 2-3 wk
all other ROS negative except as per HPI

## 2019-03-11 NOTE — PHYSICAL EXAM
[Obese] : obese [Normal] : affect appropriate [de-identified] : central adiposity [de-identified] : walks w/ use of rollator

## 2019-03-11 NOTE — HISTORY OF PRESENT ILLNESS
[FreeTextEntry1] : 63 yo F s/p band removal March 2017 w/ BMI 53, HTN, DM2, HLD and overactive bladder presents for weight management f/u.\par Weight relatively unchanged since last visit w/ fat loss.\par \par Pt tolerating Trulicity w/ no reflux or abdominal pain.\par Notices no change in appetite w/ phentermine.\par Diet unchanged per below.\par Working w/ PT twice weekly for activity, otherwise limited by hip pain, scheduled for hip replacement mid April.\par Sleep cont to be interrupted at night w/ overactive bladder and pt sleeping best from 6a-9a.\par B drinking protein shakes and yogurt or hard boiled egg\par L: salad\par D: veggie, w/ baked fish or chicken\par Sn: frozen fruit\par Dr: water, vitamin water\par Attempts not to eat after 7pm

## 2019-03-11 NOTE — REASON FOR VISIT
[Follow-Up Visit] : a follow-up visit for [Diabetes Mellitus] : diabetes mellitus [Hypertension] : hypertension [Obesity] : obesity [FreeTextEntry2] : insomnia

## 2019-03-18 ENCOUNTER — APPOINTMENT (OUTPATIENT)
Dept: UROLOGY | Facility: CLINIC | Age: 64
End: 2019-03-18
Payer: MEDICARE

## 2019-03-18 ENCOUNTER — OUTPATIENT (OUTPATIENT)
Dept: OUTPATIENT SERVICES | Facility: HOSPITAL | Age: 64
LOS: 1 days | End: 2019-03-18
Payer: COMMERCIAL

## 2019-03-18 DIAGNOSIS — Z98.42 CATARACT EXTRACTION STATUS, LEFT EYE: Chronic | ICD-10-CM

## 2019-03-18 DIAGNOSIS — Z87.442 PERSONAL HISTORY OF URINARY CALCULI: Chronic | ICD-10-CM

## 2019-03-18 DIAGNOSIS — Z98.89 OTHER SPECIFIED POSTPROCEDURAL STATES: Chronic | ICD-10-CM

## 2019-03-18 DIAGNOSIS — N32.81 OVERACTIVE BLADDER: Chronic | ICD-10-CM

## 2019-03-18 DIAGNOSIS — Z90.710 ACQUIRED ABSENCE OF BOTH CERVIX AND UTERUS: Chronic | ICD-10-CM

## 2019-03-18 DIAGNOSIS — R35.0 FREQUENCY OF MICTURITION: ICD-10-CM

## 2019-03-18 PROCEDURE — 95972 ALYS CPLX SP/PN NPGT W/PRGRM: CPT

## 2019-03-18 PROCEDURE — 95972 ALYS CPLX SP/PN NPGT W/PRGRM: CPT | Mod: 26

## 2019-03-18 PROCEDURE — 99214 OFFICE O/P EST MOD 30 MIN: CPT | Mod: 25

## 2019-03-18 NOTE — ASSESSMENT
[FreeTextEntry1] : \par \par Impression/Plan:64  Y.O woman with refractory OAB- wet- increased diurnal  urinary frequency, UUI  .Interstim reprogrammed and able to get 3 programs that pt senses stim in vaginal area .\par 1.Call office in 2 weeks as to outcome with program # 4 .\par 2.Turn off device at time of hip surgery- pt able to do same. \par 3.RTO in 6 months.\par \par

## 2019-03-18 NOTE — HISTORY OF PRESENT ILLNESS
[FreeTextEntry1] : 63 yo woman with  OAB and urge incontinence SP SNS April 2016 , feels stim in vagina and sometimes radiating to her right toes, still  requiring 3 pads per day. Some programs are radiating stim to her right toes. She also has GABRIEL to a lesser degree. PSH NICK 1 year ago. She had failed anticholinergics and Mirabegron. Voids  2-3  hours during the day and nocturia x 1.Fluids 24 oz X 2-3  while at home , fluids restricted when going out of home.\par She had her right hip replacement on 12/5/18 with Dr Lowe at [facility] and doing well , left ORIF scheduled for 4/10/19. She will turn off her SNS before surgery and resume same post surgery.Reviewed same and pt is able to demonstrate same with her device. Interrogated device and no impedances , current program # 2 at 1.7 felt in vaginal area . all other programs are radiating to right toes.Reprogrammed # 3 to -3+0 at 1.3 felt vaginally , reprogrammed # 4 to +1,-0 at 1.7 felt vaginally .Unable to reprogram #1 .Pt will try 4 for the ext 2 weeks and call me with outcome. \par \par \par \par \par \par

## 2019-03-21 ENCOUNTER — OUTPATIENT (OUTPATIENT)
Dept: OUTPATIENT SERVICES | Facility: HOSPITAL | Age: 64
LOS: 1 days | End: 2019-03-21
Payer: COMMERCIAL

## 2019-03-21 VITALS
OXYGEN SATURATION: 99 % | RESPIRATION RATE: 14 BRPM | DIASTOLIC BLOOD PRESSURE: 80 MMHG | TEMPERATURE: 98 F | SYSTOLIC BLOOD PRESSURE: 130 MMHG | HEIGHT: 62 IN | HEART RATE: 60 BPM | WEIGHT: 289.91 LBS

## 2019-03-21 DIAGNOSIS — Z98.42 CATARACT EXTRACTION STATUS, LEFT EYE: Chronic | ICD-10-CM

## 2019-03-21 DIAGNOSIS — Z01.818 ENCOUNTER FOR OTHER PREPROCEDURAL EXAMINATION: ICD-10-CM

## 2019-03-21 DIAGNOSIS — M19.90 UNSPECIFIED OSTEOARTHRITIS, UNSPECIFIED SITE: ICD-10-CM

## 2019-03-21 DIAGNOSIS — N32.81 OVERACTIVE BLADDER: Chronic | ICD-10-CM

## 2019-03-21 DIAGNOSIS — N32.81 OVERACTIVE BLADDER: ICD-10-CM

## 2019-03-21 DIAGNOSIS — Z87.442 PERSONAL HISTORY OF URINARY CALCULI: Chronic | ICD-10-CM

## 2019-03-21 DIAGNOSIS — Z90.710 ACQUIRED ABSENCE OF BOTH CERVIX AND UTERUS: Chronic | ICD-10-CM

## 2019-03-21 DIAGNOSIS — Z98.89 OTHER SPECIFIED POSTPROCEDURAL STATES: Chronic | ICD-10-CM

## 2019-03-21 DIAGNOSIS — Z96.641 PRESENCE OF RIGHT ARTIFICIAL HIP JOINT: Chronic | ICD-10-CM

## 2019-03-21 DIAGNOSIS — M16.12 UNILATERAL PRIMARY OSTEOARTHRITIS, LEFT HIP: ICD-10-CM

## 2019-03-21 DIAGNOSIS — E11.9 TYPE 2 DIABETES MELLITUS WITHOUT COMPLICATIONS: ICD-10-CM

## 2019-03-21 LAB
ALBUMIN SERPL ELPH-MCNC: 3.8 G/DL — SIGNIFICANT CHANGE UP (ref 3.3–5)
ALLERGY+IMMUNOLOGY DIAG STUDY NOTE: SIGNIFICANT CHANGE UP
ALP SERPL-CCNC: 106 U/L — SIGNIFICANT CHANGE UP (ref 30–120)
ALT FLD-CCNC: 35 U/L DA — SIGNIFICANT CHANGE UP (ref 10–60)
ANION GAP SERPL CALC-SCNC: 6 MMOL/L — SIGNIFICANT CHANGE UP (ref 5–17)
APTT BLD: 36.2 SEC — SIGNIFICANT CHANGE UP (ref 28.5–37)
AST SERPL-CCNC: 31 U/L — SIGNIFICANT CHANGE UP (ref 10–40)
BILIRUB SERPL-MCNC: 0.2 MG/DL — SIGNIFICANT CHANGE UP (ref 0.2–1.2)
BUN SERPL-MCNC: 13 MG/DL — SIGNIFICANT CHANGE UP (ref 7–23)
CALCIUM SERPL-MCNC: 10 MG/DL — SIGNIFICANT CHANGE UP (ref 8.4–10.5)
CHLORIDE SERPL-SCNC: 103 MMOL/L — SIGNIFICANT CHANGE UP (ref 96–108)
CO2 SERPL-SCNC: 31 MMOL/L — SIGNIFICANT CHANGE UP (ref 22–31)
CREAT SERPL-MCNC: 0.77 MG/DL — SIGNIFICANT CHANGE UP (ref 0.5–1.3)
GLUCOSE SERPL-MCNC: 96 MG/DL — SIGNIFICANT CHANGE UP (ref 70–99)
HCT VFR BLD CALC: 44.4 % — SIGNIFICANT CHANGE UP (ref 34.5–45)
HGB BLD-MCNC: 14 G/DL — SIGNIFICANT CHANGE UP (ref 11.5–15.5)
INR BLD: 1.19 RATIO — HIGH (ref 0.88–1.16)
MCHC RBC-ENTMCNC: 27.5 PG — SIGNIFICANT CHANGE UP (ref 27–34)
MCHC RBC-ENTMCNC: 31.5 GM/DL — LOW (ref 32–36)
MCV RBC AUTO: 87.2 FL — SIGNIFICANT CHANGE UP (ref 80–100)
NRBC # BLD: 0 /100 WBCS — SIGNIFICANT CHANGE UP (ref 0–0)
PLATELET # BLD AUTO: 306 K/UL — SIGNIFICANT CHANGE UP (ref 150–400)
POTASSIUM SERPL-MCNC: 4.1 MMOL/L — SIGNIFICANT CHANGE UP (ref 3.5–5.3)
POTASSIUM SERPL-SCNC: 4.1 MMOL/L — SIGNIFICANT CHANGE UP (ref 3.5–5.3)
PROT SERPL-MCNC: 8.6 G/DL — HIGH (ref 6–8.3)
PROTHROM AB SERPL-ACNC: 13 SEC — HIGH (ref 10–12.9)
RBC # BLD: 5.09 M/UL — SIGNIFICANT CHANGE UP (ref 3.8–5.2)
RBC # FLD: 14 % — SIGNIFICANT CHANGE UP (ref 10.3–14.5)
SODIUM SERPL-SCNC: 140 MMOL/L — SIGNIFICANT CHANGE UP (ref 135–145)
WBC # BLD: 8.71 K/UL — SIGNIFICANT CHANGE UP (ref 3.8–10.5)
WBC # FLD AUTO: 8.71 K/UL — SIGNIFICANT CHANGE UP (ref 3.8–10.5)

## 2019-03-21 PROCEDURE — 83036 HEMOGLOBIN GLYCOSYLATED A1C: CPT

## 2019-03-21 PROCEDURE — 86900 BLOOD TYPING SEROLOGIC ABO: CPT

## 2019-03-21 PROCEDURE — 85027 COMPLETE CBC AUTOMATED: CPT

## 2019-03-21 PROCEDURE — 85730 THROMBOPLASTIN TIME PARTIAL: CPT

## 2019-03-21 PROCEDURE — 85610 PROTHROMBIN TIME: CPT

## 2019-03-21 PROCEDURE — 80053 COMPREHEN METABOLIC PANEL: CPT

## 2019-03-21 PROCEDURE — G0463: CPT

## 2019-03-21 PROCEDURE — 86901 BLOOD TYPING SEROLOGIC RH(D): CPT

## 2019-03-21 PROCEDURE — 87640 STAPH A DNA AMP PROBE: CPT

## 2019-03-21 PROCEDURE — 93005 ELECTROCARDIOGRAM TRACING: CPT

## 2019-03-21 PROCEDURE — 86850 RBC ANTIBODY SCREEN: CPT

## 2019-03-21 PROCEDURE — 36415 COLL VENOUS BLD VENIPUNCTURE: CPT

## 2019-03-21 PROCEDURE — 93010 ELECTROCARDIOGRAM REPORT: CPT

## 2019-03-21 RX ORDER — DEXTROSE 50 % IN WATER 50 %
25 SYRINGE (ML) INTRAVENOUS ONCE
Qty: 0 | Refills: 0 | Status: DISCONTINUED | OUTPATIENT
Start: 2019-04-10 | End: 2019-04-12

## 2019-03-21 RX ORDER — GLUCAGON INJECTION, SOLUTION 0.5 MG/.1ML
1 INJECTION, SOLUTION SUBCUTANEOUS ONCE
Qty: 0 | Refills: 0 | Status: DISCONTINUED | OUTPATIENT
Start: 2019-04-10 | End: 2019-04-12

## 2019-03-21 RX ORDER — DEXTROSE 50 % IN WATER 50 %
12.5 SYRINGE (ML) INTRAVENOUS ONCE
Qty: 0 | Refills: 0 | Status: DISCONTINUED | OUTPATIENT
Start: 2019-04-10 | End: 2019-04-12

## 2019-03-21 RX ORDER — DEXTROSE 50 % IN WATER 50 %
15 SYRINGE (ML) INTRAVENOUS ONCE
Qty: 0 | Refills: 0 | Status: DISCONTINUED | OUTPATIENT
Start: 2019-04-10 | End: 2019-04-12

## 2019-03-21 RX ORDER — TOPIRAMATE 25 MG
0 TABLET ORAL
Qty: 0 | Refills: 0 | COMMUNITY

## 2019-03-21 RX ORDER — SODIUM CHLORIDE 9 MG/ML
1000 INJECTION, SOLUTION INTRAVENOUS
Qty: 0 | Refills: 0 | Status: DISCONTINUED | OUTPATIENT
Start: 2019-04-10 | End: 2019-04-12

## 2019-03-21 NOTE — H&P PST ADULT - MUSCULOSKELETAL
details… detailed exam decreased ROM due to pain/joint swelling/diminished strength joint swelling/decreased ROM due to pain/no calf tenderness/diminished strength

## 2019-03-21 NOTE — H&P PST ADULT - NSICDXPROBLEM_GEN_ALL_CORE_FT
PROBLEM DIAGNOSES  Problem: Type 2 diabetes mellitus  Assessment and Plan:     Problem: Overactive bladder  Assessment and Plan: inter stim stimulator in place   will obtain urology clearance     Problem: Osteoarthritis  Assessment and Plan: Left hip replacement   medical and cardiac clearance   Abnormal EKG; Cardiology will address the EKG in the clearance   pre op instructions PROBLEM DIAGNOSES  Problem: Type 2 diabetes mellitus  Assessment and Plan: Hold Bydureon injection on DOS  Hypoglycemia protocol   Accu check on admit     Problem: Overactive bladder  Assessment and Plan: inter stim stimulator in place   will obtain urology clearance     Problem: Osteoarthritis  Assessment and Plan: Left hip replacement   medical and cardiac clearance   Abnormal EKG; Cardiology will address the EKG in the clearance   pre op instructions   pharmacy consult

## 2019-03-21 NOTE — H&P PST ADULT - ASSESSMENT
64 y/o female with bilateral hip OA 65 y/o female with left hip OA and persistent hip pain for left hip replacement

## 2019-03-21 NOTE — H&P PST ADULT - HISTORY OF PRESENT ILLNESS
62y/o morbidly obese female with h/o gout, bilateral hip OA,HTN, diverticulitis, overactive bladder s/p Interstim insertion 2016 presents with longstanding history of worsening bilateral hip pain , difficulty walking , unable to perforn ADLS . Ambulates with walker . Reports constant pain and worse pain with walking and standing with pain scale 10/10 . scheduled for right total hip replacement stage 1 65y/o morbidly obese female with h/o gout, left  hip OA,HTN, diverticulitis, overactive bladder s/p Interstim insertion 2016 presents with longstanding history of worsening left hip pain , difficulty walking ,and  unable to perform ADL . Ambulates with walker . Reports constant pain with radiation to groin  and worse pain with walking and standing with pain scale 10/10 . patient had right hip replacement last year with significant improvement.  scheduled for left  total hip replacement on 4/10/19

## 2019-03-21 NOTE — H&P PST ADULT - OTHER CARE PROVIDERS
Dr. Isaias Jones([facility])- [phone];    Nephrologist Dr. Isaias Jones([facility])- [phone];    urologist

## 2019-03-21 NOTE — H&P PST ADULT - NSICDXFAMILYHX_GEN_ALL_CORE_FT
FAMILY HISTORY:  Father  Still living? Unknown  Family history of heart disease, Age at diagnosis: Age Unknown    Mother  Still living? Unknown  Family history of diabetes mellitus, Age at diagnosis: Age Unknown    Child  Still living? Yes, Estimated age: 51-60  Family history of heart disease, Age at diagnosis: Age Unknown

## 2019-03-21 NOTE — H&P PST ADULT - NSICDXPASTSURGICALHX_GEN_ALL_CORE_FT
PAST SURGICAL HISTORY:  History of cataract surgery, left     History of Cholecystectomy- 2011/April 2011    History of kidney stones s/p percutaneous stone extraction 2011    Lap-Band Surgery - 2006 Removal of Lap band- 03/2017    OAB (overactive bladder) s/p Interstim insertion-04/2016  revision 2017, Replacement interstim implant 8/2018    S/P D&C (status post dilation and curettage) 2010 and 2014    S/P hip replacement, right 2018    S/P hysterectomy 2014 PAST SURGICAL HISTORY:  History of cataract surgery, left     History of Cholecystectomy- 2011/April 2011    History of kidney stones s/p percutaneous stone extraction 2011    Lap-Band Surgery - 2006 Removal of Lap band- 03/2017    OAB (overactive bladder) s/p Interstim insertion-04/2016  revision 2017, Replacement inter stim implant 8/2018    S/P D&C (status post dilation and curettage) 2010 and 2014    S/P hip replacement, right 2018    S/P hysterectomy 2014 PAST SURGICAL HISTORY:  History of cataract surgery, left     History of Cholecystectomy- 2011/April 2011    History of kidney stones s/p percutaneous stone extraction 2011    Lap-Band Surgery - 2006 Removal of Lap band- 03/2017    OAB (overactive bladder) s/p Interstim insertion-04/2016  revision 2017, Replacement Interstim implant 8/2018    S/P D&C (status post dilation and curettage) 2010 and 2014    S/P hip replacement, right 2018    S/P hysterectomy 2014

## 2019-03-21 NOTE — H&P PST ADULT - VENOUS THROMBOEMBOLISM CURRENT STATUS
(0) indicator not present (1) other risk factor (includes escalating BMI, pack-years of smoking, diabetes requiring insulin, chemotherapy, female gender and length of surgery)/(1) history of inflammatory bowel disease (IBD)

## 2019-03-21 NOTE — H&P PST ADULT - RS GEN PE MLT RESP DETAILS PC
good air movement/breath sounds equal/no rhonchi/respirations non-labored/clear to auscultation bilaterally/no rales

## 2019-03-21 NOTE — H&P PST ADULT - NSICDXPASTMEDICALHX_GEN_ALL_CORE_FT
PAST MEDICAL HISTORY:  Diverticulitis     Essential hypertension     GERD (Gastroesophageal Reflux Disease)     Gout     Irritable bowel syndrome, unspecified type     Morbid Obesity BMI 52    Osteoarthritis     Overactive Bladder     Pancreatitis Chronic stable since 2008 PAST MEDICAL HISTORY:  Diverticulitis     Essential hypertension     GERD (Gastroesophageal Reflux Disease)     Gout     Irritable bowel syndrome, unspecified type     Morbid Obesity BMI 52    Osteoarthritis     Overactive Bladder     Pancreatitis Chronic stable since 2008    Type 2 diabetes mellitus

## 2019-03-22 DIAGNOSIS — N32.81 OVERACTIVE BLADDER: ICD-10-CM

## 2019-03-22 DIAGNOSIS — N39.41 URGE INCONTINENCE: ICD-10-CM

## 2019-03-22 DIAGNOSIS — R35.0 FREQUENCY OF MICTURITION: ICD-10-CM

## 2019-03-22 LAB
HBA1C BLD-MCNC: 6.1 % — HIGH (ref 4–5.6)
MRSA PCR RESULT.: SIGNIFICANT CHANGE UP
S AUREUS DNA NOSE QL NAA+PROBE: SIGNIFICANT CHANGE UP

## 2019-03-25 ENCOUNTER — APPOINTMENT (OUTPATIENT)
Dept: BARIATRICS/WEIGHT MGMT | Facility: CLINIC | Age: 64
End: 2019-03-25
Payer: MEDICARE

## 2019-03-25 VITALS
DIASTOLIC BLOOD PRESSURE: 80 MMHG | OXYGEN SATURATION: 99 % | SYSTOLIC BLOOD PRESSURE: 132 MMHG | HEART RATE: 74 BPM | HEIGHT: 61 IN | BODY MASS INDEX: 53.81 KG/M2 | WEIGHT: 285 LBS

## 2019-03-25 PROCEDURE — 99214 OFFICE O/P EST MOD 30 MIN: CPT

## 2019-03-25 NOTE — REASON FOR VISIT
[Follow-Up Visit] : a follow-up visit for [Diabetes Mellitus] : diabetes mellitus [Obesity] : obesity [FreeTextEntry2] : insomnia

## 2019-03-25 NOTE — PHYSICAL EXAM
[Obese] : obese [Normal] : affect appropriate [de-identified] : central adiposity [de-identified] : walks w/ use of rollator

## 2019-03-25 NOTE — ASSESSMENT
[FreeTextEntry1] : 65 yo F s/p band removal March 2017 w/ BMI 53, HTN, DM2, HLD and overactive bladder presents for weight management f/u.\par Weight unchanged..\par \par Plan:\par Cont GLP-1 agonist having lighter meals as heavier meals trigger nausea\par Increase Topamx 50mg-> 100mg\par Cont to make efforts for sleep regulation avoiding daytime naps\par Pt w/ goal for weight loss prior to scheduled hip sx April 10, 2019\par Cont PT prior, plan for rehab after\par Also spoke with pt daughter via phone, both amendable to plan\par \par RTC 2 wk for re-eval prior to hip sx

## 2019-03-25 NOTE — HISTORY OF PRESENT ILLNESS
[FreeTextEntry1] : 65 yo F s/p band removal March 2017 w/ BMI 53, HTN, DM2, HLD and overactive bladder presents for weight management f/u.\par Weight unchanged since last visit.\par \par Pt reporting decrease in appetite w/ Bydureon but notes nausea which resolves after initial day or two.  Sleep remains disrupted, no relief w/ Topamax although it too helps w/ appetite.  She is now practicing improved sleep hygiene cutting off the TV and not using cell phone.  However, cont to fall asleep during the day, although not purposefully, which appears to disrupt night time sleep.

## 2019-04-01 ENCOUNTER — APPOINTMENT (OUTPATIENT)
Dept: ORTHOPEDIC SURGERY | Facility: CLINIC | Age: 64
End: 2019-04-01
Payer: MEDICARE

## 2019-04-01 VITALS — SYSTOLIC BLOOD PRESSURE: 127 MMHG | DIASTOLIC BLOOD PRESSURE: 84 MMHG | HEART RATE: 70 BPM

## 2019-04-01 PROCEDURE — 73502 X-RAY EXAM HIP UNI 2-3 VIEWS: CPT

## 2019-04-01 PROCEDURE — 73562 X-RAY EXAM OF KNEE 3: CPT | Mod: RT

## 2019-04-01 PROCEDURE — 99213 OFFICE O/P EST LOW 20 MIN: CPT

## 2019-04-05 ENCOUNTER — APPOINTMENT (OUTPATIENT)
Dept: BARIATRICS/WEIGHT MGMT | Facility: CLINIC | Age: 64
End: 2019-04-05
Payer: MEDICARE

## 2019-04-05 VITALS
HEART RATE: 86 BPM | BODY MASS INDEX: 53.24 KG/M2 | SYSTOLIC BLOOD PRESSURE: 130 MMHG | WEIGHT: 282 LBS | HEIGHT: 61 IN | OXYGEN SATURATION: 96 % | DIASTOLIC BLOOD PRESSURE: 80 MMHG

## 2019-04-05 PROCEDURE — 99214 OFFICE O/P EST MOD 30 MIN: CPT

## 2019-04-05 NOTE — HISTORY OF PRESENT ILLNESS
[FreeTextEntry1] : 63 yo F s/p band removal March 2017 w/ BMI 53, HTN, DM2, HLD and overactive bladder presents for weight management f/u.\par Decrease in both weight and fat loss since last visit.\par \par Pt no longer having nausea w/ GLP-1.  Tolerating increased dose of Topiramate now feeling slightly more drowsy in addition to improved sleep hygiene.  Still taking daytime naps but more conscious about limiting them.\par Pt planning for left hip replacement surgery next week.  She will cont meds and dietary changes during rehab period and RTC in about 2mo.

## 2019-04-05 NOTE — PHYSICAL EXAM
[Obese] : obese [Normal] : affect appropriate [de-identified] : central adiposity [de-identified] : walks w/ use of rollator

## 2019-04-05 NOTE — ASSESSMENT
[FreeTextEntry1] : 65 yo F s/p band removal March 2017 w/ BMI 53, HTN, DM2, HLD and overactive bladder presents for weight management f/u.\par Decrease in both weight and fat loss since last visit.\par \par Plan:\par Improvement in sleep and appetite w/ increased dose of Topiramate 100mg and better sleep hygiene, will refill today\par Cont GLP-1 at increased dose as it is now better tolerated\par Pt planning for left hip replacement surgery next week.  She will cont meds and dietary changes during rehab period \par \par RTC in 2mo

## 2019-04-09 RX ORDER — DOCUSATE SODIUM 100 MG
100 CAPSULE ORAL THREE TIMES A DAY
Qty: 0 | Refills: 0 | Status: DISCONTINUED | OUTPATIENT
Start: 2019-04-10 | End: 2019-04-12

## 2019-04-09 RX ORDER — SODIUM CHLORIDE 9 MG/ML
1000 INJECTION, SOLUTION INTRAVENOUS
Qty: 0 | Refills: 0 | Status: DISCONTINUED | OUTPATIENT
Start: 2019-04-10 | End: 2019-04-12

## 2019-04-09 RX ORDER — MAGNESIUM HYDROXIDE 400 MG/1
30 TABLET, CHEWABLE ORAL DAILY
Qty: 0 | Refills: 0 | Status: DISCONTINUED | OUTPATIENT
Start: 2019-04-10 | End: 2019-04-12

## 2019-04-09 RX ORDER — POLYETHYLENE GLYCOL 3350 17 G/17G
17 POWDER, FOR SOLUTION ORAL DAILY
Qty: 0 | Refills: 0 | Status: DISCONTINUED | OUTPATIENT
Start: 2019-04-10 | End: 2019-04-12

## 2019-04-09 RX ORDER — SENNA PLUS 8.6 MG/1
2 TABLET ORAL AT BEDTIME
Qty: 0 | Refills: 0 | Status: DISCONTINUED | OUTPATIENT
Start: 2019-04-10 | End: 2019-04-12

## 2019-04-09 RX ORDER — ONDANSETRON 8 MG/1
4 TABLET, FILM COATED ORAL EVERY 6 HOURS
Qty: 0 | Refills: 0 | Status: DISCONTINUED | OUTPATIENT
Start: 2019-04-10 | End: 2019-04-12

## 2019-04-09 RX ORDER — PANTOPRAZOLE SODIUM 20 MG/1
40 TABLET, DELAYED RELEASE ORAL
Qty: 0 | Refills: 0 | Status: DISCONTINUED | OUTPATIENT
Start: 2019-04-10 | End: 2019-04-12

## 2019-04-10 ENCOUNTER — INPATIENT (INPATIENT)
Facility: HOSPITAL | Age: 64
LOS: 1 days | Discharge: HOME | DRG: 470 | End: 2019-04-12
Attending: ORTHOPAEDIC SURGERY | Admitting: ORTHOPAEDIC SURGERY
Payer: COMMERCIAL

## 2019-04-10 ENCOUNTER — RESULT REVIEW (OUTPATIENT)
Age: 64
End: 2019-04-10

## 2019-04-10 ENCOUNTER — APPOINTMENT (OUTPATIENT)
Dept: ORTHOPEDIC SURGERY | Facility: HOSPITAL | Age: 64
End: 2019-04-10

## 2019-04-10 VITALS
TEMPERATURE: 98 F | SYSTOLIC BLOOD PRESSURE: 142 MMHG | OXYGEN SATURATION: 100 % | HEIGHT: 62 IN | WEIGHT: 282.85 LBS | HEART RATE: 91 BPM | DIASTOLIC BLOOD PRESSURE: 89 MMHG | RESPIRATION RATE: 19 BRPM

## 2019-04-10 DIAGNOSIS — Z96.641 PRESENCE OF RIGHT ARTIFICIAL HIP JOINT: Chronic | ICD-10-CM

## 2019-04-10 DIAGNOSIS — M10.9 GOUT, UNSPECIFIED: ICD-10-CM

## 2019-04-10 DIAGNOSIS — I10 ESSENTIAL (PRIMARY) HYPERTENSION: ICD-10-CM

## 2019-04-10 DIAGNOSIS — E11.9 TYPE 2 DIABETES MELLITUS WITHOUT COMPLICATIONS: ICD-10-CM

## 2019-04-10 DIAGNOSIS — M19.90 UNSPECIFIED OSTEOARTHRITIS, UNSPECIFIED SITE: ICD-10-CM

## 2019-04-10 DIAGNOSIS — Z98.42 CATARACT EXTRACTION STATUS, LEFT EYE: Chronic | ICD-10-CM

## 2019-04-10 DIAGNOSIS — Z98.89 OTHER SPECIFIED POSTPROCEDURAL STATES: Chronic | ICD-10-CM

## 2019-04-10 DIAGNOSIS — M17.11 UNILATERAL PRIMARY OSTEOARTHRITIS, RIGHT KNEE: ICD-10-CM

## 2019-04-10 DIAGNOSIS — Z90.710 ACQUIRED ABSENCE OF BOTH CERVIX AND UTERUS: Chronic | ICD-10-CM

## 2019-04-10 DIAGNOSIS — Z87.442 PERSONAL HISTORY OF URINARY CALCULI: Chronic | ICD-10-CM

## 2019-04-10 DIAGNOSIS — M16.12 UNILATERAL PRIMARY OSTEOARTHRITIS, LEFT HIP: ICD-10-CM

## 2019-04-10 DIAGNOSIS — N32.81 OVERACTIVE BLADDER: Chronic | ICD-10-CM

## 2019-04-10 LAB
ANION GAP SERPL CALC-SCNC: 9 MMOL/L — SIGNIFICANT CHANGE UP (ref 5–17)
BUN SERPL-MCNC: 17 MG/DL — SIGNIFICANT CHANGE UP (ref 7–23)
CALCIUM SERPL-MCNC: 9.1 MG/DL — SIGNIFICANT CHANGE UP (ref 8.4–10.5)
CHLORIDE SERPL-SCNC: 102 MMOL/L — SIGNIFICANT CHANGE UP (ref 96–108)
CO2 SERPL-SCNC: 20 MMOL/L — LOW (ref 22–31)
CREAT SERPL-MCNC: <0.2 MG/DL — LOW (ref 0.5–1.3)
GLUCOSE BLDC GLUCOMTR-MCNC: 124 MG/DL — HIGH (ref 70–99)
GLUCOSE BLDC GLUCOMTR-MCNC: 170 MG/DL — HIGH (ref 70–99)
GLUCOSE BLDC GLUCOMTR-MCNC: 91 MG/DL — SIGNIFICANT CHANGE UP (ref 70–99)
GLUCOSE BLDC GLUCOMTR-MCNC: 93 MG/DL — SIGNIFICANT CHANGE UP (ref 70–99)
GLUCOSE SERPL-MCNC: 149 MG/DL — HIGH (ref 70–99)
HCT VFR BLD CALC: 44.4 % — SIGNIFICANT CHANGE UP (ref 34.5–45)
HGB BLD-MCNC: 13.6 G/DL — SIGNIFICANT CHANGE UP (ref 11.5–15.5)
INR BLD: 1.14 RATIO — SIGNIFICANT CHANGE UP (ref 0.88–1.16)
POTASSIUM SERPL-MCNC: 5.4 MMOL/L — HIGH (ref 3.5–5.3)
POTASSIUM SERPL-SCNC: 5.4 MMOL/L — HIGH (ref 3.5–5.3)
PROTHROM AB SERPL-ACNC: 12.5 SEC — SIGNIFICANT CHANGE UP (ref 10–12.9)
SODIUM SERPL-SCNC: 131 MMOL/L — LOW (ref 135–145)

## 2019-04-10 PROCEDURE — 27130 TOTAL HIP ARTHROPLASTY: CPT | Mod: 82,LT

## 2019-04-10 PROCEDURE — 20610 DRAIN/INJ JOINT/BURSA W/O US: CPT | Mod: RT

## 2019-04-10 PROCEDURE — 99222 1ST HOSP IP/OBS MODERATE 55: CPT

## 2019-04-10 PROCEDURE — 73502 X-RAY EXAM HIP UNI 2-3 VIEWS: CPT | Mod: 26,LT

## 2019-04-10 PROCEDURE — 88305 TISSUE EXAM BY PATHOLOGIST: CPT | Mod: 26

## 2019-04-10 PROCEDURE — 27130 TOTAL HIP ARTHROPLASTY: CPT | Mod: LT

## 2019-04-10 PROCEDURE — 88311 DECALCIFY TISSUE: CPT | Mod: 26

## 2019-04-10 RX ORDER — MELOXICAM 15 MG/1
15 TABLET ORAL DAILY
Qty: 0 | Refills: 0 | Status: DISCONTINUED | OUTPATIENT
Start: 2019-04-11 | End: 2019-04-12

## 2019-04-10 RX ORDER — TOPIRAMATE 25 MG
25 TABLET ORAL AT BEDTIME
Qty: 0 | Refills: 0 | Status: DISCONTINUED | OUTPATIENT
Start: 2019-04-10 | End: 2019-04-12

## 2019-04-10 RX ORDER — GABAPENTIN 400 MG/1
300 CAPSULE ORAL DAILY
Qty: 0 | Refills: 0 | Status: DISCONTINUED | OUTPATIENT
Start: 2019-04-10 | End: 2019-04-12

## 2019-04-10 RX ORDER — ALLOPURINOL 300 MG
300 TABLET ORAL DAILY
Qty: 0 | Refills: 0 | Status: DISCONTINUED | OUTPATIENT
Start: 2019-04-10 | End: 2019-04-12

## 2019-04-10 RX ORDER — CEFAZOLIN SODIUM 1 G
3000 VIAL (EA) INJECTION EVERY 8 HOURS
Qty: 0 | Refills: 0 | Status: COMPLETED | OUTPATIENT
Start: 2019-04-10 | End: 2019-04-11

## 2019-04-10 RX ORDER — AMLODIPINE BESYLATE 2.5 MG/1
5 TABLET ORAL DAILY
Qty: 0 | Refills: 0 | Status: DISCONTINUED | OUTPATIENT
Start: 2019-04-10 | End: 2019-04-12

## 2019-04-10 RX ORDER — APREPITANT 80 MG/1
40 CAPSULE ORAL ONCE
Qty: 0 | Refills: 0 | Status: COMPLETED | OUTPATIENT
Start: 2019-04-10 | End: 2019-04-10

## 2019-04-10 RX ORDER — DEXTROSE 50 % IN WATER 50 %
12.5 SYRINGE (ML) INTRAVENOUS ONCE
Qty: 0 | Refills: 0 | Status: DISCONTINUED | OUTPATIENT
Start: 2019-04-10 | End: 2019-04-12

## 2019-04-10 RX ORDER — GLUCAGON INJECTION, SOLUTION 0.5 MG/.1ML
1 INJECTION, SOLUTION SUBCUTANEOUS ONCE
Qty: 0 | Refills: 0 | Status: DISCONTINUED | OUTPATIENT
Start: 2019-04-10 | End: 2019-04-12

## 2019-04-10 RX ORDER — DEXTROSE 50 % IN WATER 50 %
25 SYRINGE (ML) INTRAVENOUS ONCE
Qty: 0 | Refills: 0 | Status: DISCONTINUED | OUTPATIENT
Start: 2019-04-10 | End: 2019-04-12

## 2019-04-10 RX ORDER — APIXABAN 2.5 MG/1
2.5 TABLET, FILM COATED ORAL
Qty: 0 | Refills: 0 | Status: COMPLETED | OUTPATIENT
Start: 2019-04-11 | End: 2019-04-11

## 2019-04-10 RX ORDER — SODIUM CHLORIDE 9 MG/ML
1000 INJECTION, SOLUTION INTRAVENOUS
Qty: 0 | Refills: 0 | Status: DISCONTINUED | OUTPATIENT
Start: 2019-04-10 | End: 2019-04-12

## 2019-04-10 RX ORDER — APIXABAN 2.5 MG/1
2.5 TABLET, FILM COATED ORAL EVERY 12 HOURS
Qty: 0 | Refills: 0 | Status: DISCONTINUED | OUTPATIENT
Start: 2019-04-12 | End: 2019-04-12

## 2019-04-10 RX ORDER — ACETAMINOPHEN 500 MG
1000 TABLET ORAL EVERY 6 HOURS
Qty: 0 | Refills: 0 | Status: COMPLETED | OUTPATIENT
Start: 2019-04-10 | End: 2019-04-11

## 2019-04-10 RX ORDER — CHOLESTYRAMINE 4 G/9G
4 POWDER, FOR SUSPENSION ORAL DAILY
Qty: 0 | Refills: 0 | Status: DISCONTINUED | OUTPATIENT
Start: 2019-04-10 | End: 2019-04-12

## 2019-04-10 RX ORDER — ONDANSETRON 8 MG/1
4 TABLET, FILM COATED ORAL ONCE
Qty: 0 | Refills: 0 | Status: DISCONTINUED | OUTPATIENT
Start: 2019-04-10 | End: 2019-04-10

## 2019-04-10 RX ORDER — HYDROMORPHONE HYDROCHLORIDE 2 MG/ML
0.5 INJECTION INTRAMUSCULAR; INTRAVENOUS; SUBCUTANEOUS
Qty: 0 | Refills: 0 | Status: DISCONTINUED | OUTPATIENT
Start: 2019-04-10 | End: 2019-04-12

## 2019-04-10 RX ORDER — CEFAZOLIN SODIUM 1 G
3000 VIAL (EA) INJECTION ONCE
Qty: 0 | Refills: 0 | Status: COMPLETED | OUTPATIENT
Start: 2019-04-10 | End: 2019-04-10

## 2019-04-10 RX ORDER — TRANEXAMIC ACID 100 MG/ML
1000 INJECTION, SOLUTION INTRAVENOUS ONCE
Qty: 0 | Refills: 0 | Status: COMPLETED | OUTPATIENT
Start: 2019-04-10 | End: 2019-04-10

## 2019-04-10 RX ORDER — DIPHENOXYLATE HCL/ATROPINE 2.5-.025MG
1 TABLET ORAL
Qty: 0 | Refills: 0 | Status: DISCONTINUED | OUTPATIENT
Start: 2019-04-10 | End: 2019-04-12

## 2019-04-10 RX ORDER — TRAMADOL HYDROCHLORIDE 50 MG/1
100 TABLET ORAL EVERY 6 HOURS
Qty: 0 | Refills: 0 | Status: DISCONTINUED | OUTPATIENT
Start: 2019-04-10 | End: 2019-04-12

## 2019-04-10 RX ORDER — HYDROMORPHONE HYDROCHLORIDE 2 MG/ML
0.5 INJECTION INTRAMUSCULAR; INTRAVENOUS; SUBCUTANEOUS
Qty: 0 | Refills: 0 | Status: DISCONTINUED | OUTPATIENT
Start: 2019-04-10 | End: 2019-04-10

## 2019-04-10 RX ORDER — INSULIN LISPRO 100/ML
VIAL (ML) SUBCUTANEOUS
Qty: 0 | Refills: 0 | Status: DISCONTINUED | OUTPATIENT
Start: 2019-04-10 | End: 2019-04-12

## 2019-04-10 RX ORDER — ACETAMINOPHEN 500 MG
1000 TABLET ORAL ONCE
Qty: 0 | Refills: 0 | Status: COMPLETED | OUTPATIENT
Start: 2019-04-10 | End: 2019-04-10

## 2019-04-10 RX ORDER — ACETAMINOPHEN 500 MG
1000 TABLET ORAL EVERY 8 HOURS
Qty: 0 | Refills: 0 | Status: DISCONTINUED | OUTPATIENT
Start: 2019-04-11 | End: 2019-04-12

## 2019-04-10 RX ORDER — TRAMADOL HYDROCHLORIDE 50 MG/1
50 TABLET ORAL EVERY 4 HOURS
Qty: 0 | Refills: 0 | Status: DISCONTINUED | OUTPATIENT
Start: 2019-04-10 | End: 2019-04-12

## 2019-04-10 RX ORDER — DEXTROSE 50 % IN WATER 50 %
15 SYRINGE (ML) INTRAVENOUS ONCE
Qty: 0 | Refills: 0 | Status: DISCONTINUED | OUTPATIENT
Start: 2019-04-10 | End: 2019-04-12

## 2019-04-10 RX ORDER — SODIUM CHLORIDE 9 MG/ML
1000 INJECTION, SOLUTION INTRAVENOUS
Qty: 0 | Refills: 0 | Status: DISCONTINUED | OUTPATIENT
Start: 2019-04-10 | End: 2019-04-10

## 2019-04-10 RX ORDER — CHLORHEXIDINE GLUCONATE 213 G/1000ML
1 SOLUTION TOPICAL ONCE
Qty: 0 | Refills: 0 | Status: COMPLETED | OUTPATIENT
Start: 2019-04-10 | End: 2019-04-10

## 2019-04-10 RX ORDER — ATENOLOL 25 MG/1
50 TABLET ORAL DAILY
Qty: 0 | Refills: 0 | Status: DISCONTINUED | OUTPATIENT
Start: 2019-04-10 | End: 2019-04-12

## 2019-04-10 RX ADMIN — SENNA PLUS 2 TABLET(S): 8.6 TABLET ORAL at 22:05

## 2019-04-10 RX ADMIN — SODIUM CHLORIDE 125 MILLILITER(S): 9 INJECTION, SOLUTION INTRAVENOUS at 17:59

## 2019-04-10 RX ADMIN — CHLORHEXIDINE GLUCONATE 1 APPLICATION(S): 213 SOLUTION TOPICAL at 08:46

## 2019-04-10 RX ADMIN — Medication 400 MILLIGRAM(S): at 17:59

## 2019-04-10 RX ADMIN — SODIUM CHLORIDE 100 MILLILITER(S): 9 INJECTION, SOLUTION INTRAVENOUS at 13:39

## 2019-04-10 RX ADMIN — APREPITANT 40 MILLIGRAM(S): 80 CAPSULE ORAL at 08:44

## 2019-04-10 RX ADMIN — Medication 400 MILLIGRAM(S): at 23:11

## 2019-04-10 RX ADMIN — Medication 25 MILLIGRAM(S): at 22:05

## 2019-04-10 RX ADMIN — Medication 1000 MILLIGRAM(S): at 23:30

## 2019-04-10 RX ADMIN — HYDROMORPHONE HYDROCHLORIDE 0.5 MILLIGRAM(S): 2 INJECTION INTRAMUSCULAR; INTRAVENOUS; SUBCUTANEOUS at 16:51

## 2019-04-10 RX ADMIN — Medication 100 MILLIGRAM(S): at 22:05

## 2019-04-10 RX ADMIN — Medication 200 MILLIGRAM(S): at 18:50

## 2019-04-10 NOTE — BRIEF OPERATIVE NOTE - NSICDXBRIEFPOSTOP_GEN_ALL_CORE_FT
POST-OP DIAGNOSIS:  DJD (degenerative joint disease) of pelvis 10-Apr-2019 13:00:30 Left Casey Mercer

## 2019-04-10 NOTE — CONSULT NOTE ADULT - ASSESSMENT
65y/o morbidly obese female with h/o gout, left  hip OA,HTN, diverticulitis, overactive bladder s/p Interstim insertion 2016 presents with longstanding history of worsening left hip pain, s/p left total hip replacement day 0

## 2019-04-10 NOTE — BRIEF OPERATIVE NOTE - NSICDXBRIEFPREOP_GEN_ALL_CORE_FT
PRE-OP DIAGNOSIS:  DJD (degenerative joint disease) of pelvis 10-Apr-2019 13:00:26 Left Casey Mercer

## 2019-04-10 NOTE — CONSULT NOTE ADULT - SUBJECTIVE AND OBJECTIVE BOX
63y/o morbidly obese female with h/o gout, left  hip OA,HTN, diverticulitis, overactive bladder s/p Interstim insertion 2016 presents with longstanding history of worsening left hip pain, s/p left total hip replacement day 0    Allergies:        Allergies:  	Celebrex: Drug, Hives    Home Medications:   * Patient Currently Takes Medications as of 21-Mar-2019 13:19 documented in Structured Notes  · 	atenolol 50 mg oral tablet: Last Dose Taken:  , 1 tab(s) orally once a day  · 	gabapentin 300 mg oral tablet: Last Dose Taken:  , 1 tab(s) orally once a day  · 	omeprazole 40 mg oral delayed release capsule: Last Dose Taken:  , 1 cap(s) orally once a day  · 	allopurinol 300 mg oral tablet: Last Dose Taken:  , 1 tab(s) orally once a day  · 	cholestyramine 4 g/5 g oral powder for reconstitution: Last Dose Taken:    · 	diphenoxylate-atropine 2.5 mg-0.025 mg oral tablet: Last Dose Taken:  , 1 tab(s) orally 4 times a day, As Needed  · 	amLODIPine 5 mg oral tablet: Last Dose Taken:  , 1 tab(s) orally once a day  · 	traMADol 50 mg oral tablet: Last Dose Taken:    · 	Bydureon Pen 2 mg subcutaneous injection, extended release: Last Dose Taken:    · 	topiramate 25 mg oral tablet: Last Dose Taken:  , orally once a day (at bedtime)    PMH/PSH/FH/SH:    Past Medical, Past Surgical, and Family History:  PAST MEDICAL HISTORY:  Diverticulitis     Essential hypertension     GERD (Gastroesophageal Reflux Disease)     Gout     Irritable bowel syndrome, unspecified type     Morbid Obesity BMI 52    Osteoarthritis     Overactive Bladder     Pancreatitis Chronic stable since 2008    Type 2 diabetes mellitus.     PAST SURGICAL HISTORY:  History of cataract surgery, left     History of Cholecystectomy- 2011/April 2011    History of kidney stones s/p percutaneous stone extraction 2011    Lap-Band Surgery - 2006 Removal of Lap band- 03/2017    OAB (overactive bladder) s/p Interstim insertion-04/2016  revision 2017, Replacement Interstim implant 8/2018    S/P D&C (status post dilation and curettage) 2010 and 2014    S/P hip replacement, right 2018    S/P hysterectomy 2014.         Review of Systems:   · General Symptoms	weight gain	  · Skin/Breast	negative	  · Ophthalmologic	negative	  · ENMT	negative	  · Negative Respiratory and Thorax Symptoms	no wheezing; no dyspnea; no cough	  · Negative Cardiovascular Symptoms	no chest pain; no palpitations; no dyspnea on exertion	  · Cardiovascular Comments	HTN , HLD controlled	  · Gastrointestinal Symptoms	diarrhea	  · Gastrointestinal Comments	IBS	  · Genitourinary Comments	overactive bladder s/p Interstim insertion 2016	  · Musculoskeletal Symptoms	arthritis; joint swelling; back pain	  · Musculoskeletal Comments	left hip pain	  · Neurological	negative	  · Psychiatric	negative	  · Hematology/Lymphatics	negative	  · Endocrine	negative	         Physical Exam:  · Constitutional	detailed exam	  · Constitutional Details	well-developed; well-groomed	  · Constitutional Comments	morbidly obese female	  · Eyes	EOMI; PERRL; no drainage or redness	  · ENMT	No oral lesions; no gross abnormalities	  · Neck	No bruits; no thyromegaly or nodules	  · Breasts	not examined	  · Back	No deformity or limitation of movement	  · Respiratory	detailed exam	  · Respiratory Details	breath sounds equal; good air movement; respirations non-labored; clear to auscultation bilaterally; no rales; no rhonchi	  · Cardiovascular	detailed exam	  · Cardiovascular Details	regular rate and rhythm	  · Cardiovascular Details	positive S1; positive S2	  · Gastrointestinal	detailed exam	  · GI Normal	normal; soft; nontender; bowel sounds normal	  · Genitourinary	not examined	  · Rectal	not examined	  · Extremities	detailed exam	  · Extremities Details	no clubbing; no cyanosis	  · Extremities Comments	bilateral LE trace edema , no calf tenderness	  · Vascular	detailed exam	  · Radial Pulse	right normal; left normal

## 2019-04-10 NOTE — CONSULT NOTE ADULT - PROBLEM SELECTOR RECOMMENDATION 9
s/p left total knee hip replacement day 0  pt/ot  pain control  encourage ambulation, incentive spirometer

## 2019-04-11 ENCOUNTER — TRANSCRIPTION ENCOUNTER (OUTPATIENT)
Age: 64
End: 2019-04-11

## 2019-04-11 LAB
ANION GAP SERPL CALC-SCNC: 6 MMOL/L — SIGNIFICANT CHANGE UP (ref 5–17)
BUN SERPL-MCNC: 16 MG/DL — SIGNIFICANT CHANGE UP (ref 7–23)
CALCIUM SERPL-MCNC: 9.2 MG/DL — SIGNIFICANT CHANGE UP (ref 8.4–10.5)
CHLORIDE SERPL-SCNC: 105 MMOL/L — SIGNIFICANT CHANGE UP (ref 96–108)
CO2 SERPL-SCNC: 28 MMOL/L — SIGNIFICANT CHANGE UP (ref 22–31)
CREAT SERPL-MCNC: 0.85 MG/DL — SIGNIFICANT CHANGE UP (ref 0.5–1.3)
GLUCOSE BLDC GLUCOMTR-MCNC: 115 MG/DL — HIGH (ref 70–99)
GLUCOSE BLDC GLUCOMTR-MCNC: 127 MG/DL — HIGH (ref 70–99)
GLUCOSE BLDC GLUCOMTR-MCNC: 133 MG/DL — HIGH (ref 70–99)
GLUCOSE BLDC GLUCOMTR-MCNC: 226 MG/DL — HIGH (ref 70–99)
GLUCOSE BLDC GLUCOMTR-MCNC: 90 MG/DL — SIGNIFICANT CHANGE UP (ref 70–99)
GLUCOSE SERPL-MCNC: 132 MG/DL — HIGH (ref 70–99)
HCT VFR BLD CALC: 30.7 % — LOW (ref 34.5–45)
HGB BLD-MCNC: 9.9 G/DL — LOW (ref 11.5–15.5)
MCHC RBC-ENTMCNC: 28 PG — SIGNIFICANT CHANGE UP (ref 27–34)
MCHC RBC-ENTMCNC: 32.2 GM/DL — SIGNIFICANT CHANGE UP (ref 32–36)
MCV RBC AUTO: 86.7 FL — SIGNIFICANT CHANGE UP (ref 80–100)
NRBC # BLD: 0 /100 WBCS — SIGNIFICANT CHANGE UP (ref 0–0)
PLATELET # BLD AUTO: 255 K/UL — SIGNIFICANT CHANGE UP (ref 150–400)
POTASSIUM SERPL-MCNC: 4.7 MMOL/L — SIGNIFICANT CHANGE UP (ref 3.5–5.3)
POTASSIUM SERPL-SCNC: 4.7 MMOL/L — SIGNIFICANT CHANGE UP (ref 3.5–5.3)
RBC # BLD: 3.54 M/UL — LOW (ref 3.8–5.2)
RBC # FLD: 14.1 % — SIGNIFICANT CHANGE UP (ref 10.3–14.5)
SODIUM SERPL-SCNC: 139 MMOL/L — SIGNIFICANT CHANGE UP (ref 135–145)
WBC # BLD: 17.37 K/UL — HIGH (ref 3.8–10.5)
WBC # FLD AUTO: 17.37 K/UL — HIGH (ref 3.8–10.5)

## 2019-04-11 PROCEDURE — 99232 SBSQ HOSP IP/OBS MODERATE 35: CPT

## 2019-04-11 RX ADMIN — Medication 1000 MILLIGRAM(S): at 13:01

## 2019-04-11 RX ADMIN — TRAMADOL HYDROCHLORIDE 100 MILLIGRAM(S): 50 TABLET ORAL at 02:52

## 2019-04-11 RX ADMIN — GABAPENTIN 300 MILLIGRAM(S): 400 CAPSULE ORAL at 15:50

## 2019-04-11 RX ADMIN — Medication 1000 MILLIGRAM(S): at 18:18

## 2019-04-11 RX ADMIN — Medication 100 MILLIGRAM(S): at 05:10

## 2019-04-11 RX ADMIN — MELOXICAM 15 MILLIGRAM(S): 15 TABLET ORAL at 13:03

## 2019-04-11 RX ADMIN — Medication 1000 MILLIGRAM(S): at 13:00

## 2019-04-11 RX ADMIN — Medication 25 MILLIGRAM(S): at 21:21

## 2019-04-11 RX ADMIN — Medication 300 MILLIGRAM(S): at 13:00

## 2019-04-11 RX ADMIN — APIXABAN 2.5 MILLIGRAM(S): 2.5 TABLET, FILM COATED ORAL at 12:59

## 2019-04-11 RX ADMIN — TRAMADOL HYDROCHLORIDE 100 MILLIGRAM(S): 50 TABLET ORAL at 03:30

## 2019-04-11 RX ADMIN — Medication 200 MILLIGRAM(S): at 01:45

## 2019-04-11 RX ADMIN — PANTOPRAZOLE SODIUM 40 MILLIGRAM(S): 20 TABLET, DELAYED RELEASE ORAL at 05:11

## 2019-04-11 RX ADMIN — TRAMADOL HYDROCHLORIDE 100 MILLIGRAM(S): 50 TABLET ORAL at 09:59

## 2019-04-11 RX ADMIN — Medication 1000 MILLIGRAM(S): at 06:00

## 2019-04-11 RX ADMIN — Medication 1000 MILLIGRAM(S): at 18:17

## 2019-04-11 RX ADMIN — SODIUM CHLORIDE 125 MILLILITER(S): 9 INJECTION, SOLUTION INTRAVENOUS at 02:07

## 2019-04-11 RX ADMIN — Medication 400 MILLIGRAM(S): at 05:10

## 2019-04-11 RX ADMIN — TRAMADOL HYDROCHLORIDE 100 MILLIGRAM(S): 50 TABLET ORAL at 09:26

## 2019-04-11 RX ADMIN — SENNA PLUS 2 TABLET(S): 8.6 TABLET ORAL at 21:21

## 2019-04-11 RX ADMIN — Medication 100 MILLIGRAM(S): at 15:50

## 2019-04-11 RX ADMIN — Medication 100 MILLIGRAM(S): at 21:20

## 2019-04-11 RX ADMIN — APIXABAN 2.5 MILLIGRAM(S): 2.5 TABLET, FILM COATED ORAL at 21:21

## 2019-04-11 RX ADMIN — MELOXICAM 15 MILLIGRAM(S): 15 TABLET ORAL at 13:02

## 2019-04-11 NOTE — OCCUPATIONAL THERAPY INITIAL EVALUATION ADULT - ADDITIONAL COMMENTS
Lives with daughter. Pt.  has 4 B[place]INGTON +railing. no steps inside. +stall shower Pt. owns DME + hip kit +commode +shower chair +rollator

## 2019-04-11 NOTE — PROGRESS NOTE ADULT - ASSESSMENT
63y/o morbidly obese female with h/o gout, left  hip OA,HTN, diverticulitis, overactive bladder s/p Interstim insertion 2016 presents with longstanding history of worsening left hip pain, s/p left total hip replacement day 1

## 2019-04-11 NOTE — DISCHARGE NOTE PROVIDER - NSDCCPTREATMENT_GEN_ALL_CORE_FT
PRINCIPAL PROCEDURE  Procedure: Arthoplasty of left hip  Findings and Treatment: PRINCIPAL PROCEDURE  Procedure: Arthoplasty of left hip  Findings and Treatment: Severe DJD left hip

## 2019-04-11 NOTE — DISCHARGE NOTE PROVIDER - HOSPITAL COURSE
This patient was admitted to [facility] with a history of severe degenerative joint disease of the left hip.  Patient went to Pre-Surgical Testing at [facility] and was medically cleared to undergo elective procedure.  Left THR performed on 4/10/19 by Dr. Lowe. No operative or gail-operative complications arose during patients hospital course.  Patient received antibiotic according to SCIP guidelines for infection prevention.  Eliquis was given for DVT prophylaxis.  Anesthesia, Medical Hospitalist, Physical Therapy and Occupational Therapy were consulted. Patient is stable for discharge with a good prognosis.  Appropriate discharge instructions and medications are provided in this document. This patient was admitted to [facility] with a history of severe degenerative joint disease of the left hip.  Patient went to Pre-Surgical Testing at     [facility] and was medically cleared to undergo elective procedure.  Left THR performed on 4/10/19 by Dr. Lowe. Stable PACU course. No     operative or gail-operative complications arose during patients hospital course.  Patient received antibiotic according to SCIP guidelines for infection     prevention.  Eliquis was given for DVT prophylaxis.  Anesthesia, Medical Hospitalist, Physical Therapy and Occupational Therapy were consulted.     Meloxicam given for Heterotopic bone protocol. Clean LEft hip incision with Nylon sutures intact. No cellulitis or dehiscence. Stable Neurovascular exam of     both LE. Patient is stable for discharge  to Sub Acute rehab ( Per patient & family choice ) with a good prognosis.  Appropriate discharge instructions     and medications are provided in this document.

## 2019-04-11 NOTE — PROGRESS NOTE ADULT - SUBJECTIVE AND OBJECTIVE BOX
POST OPERATIVE DAY #:  1  STATUS POST: Left THR                       SUBJECTIVE: Patient seen and examined early this morning.  Sitting up in chair. Feeling well.  Reported Pain score = 2    OBJECTIVE:     Vital Signs Last 24 Hrs  T(C): 36.6 (11 Apr 2019 11:55), Max: 36.6 (11 Apr 2019 07:02)  T(F): 97.8 (11 Apr 2019 11:55), Max: 97.9 (11 Apr 2019 07:02)  HR: 82 (11 Apr 2019 11:55) (64 - 82)  BP: 109/59 (11 Apr 2019 11:55) (99/71 - 132/79)  RR: 16 (11 Apr 2019 11:55) (11 - 17)  SpO2: 100% (11 Apr 2019 11:55) (96% - 100%)    Left hip:          Dressing: clean/dry/intact    Bilateral LEs:         Sensation:  intact to light touch          Motor exam:  5/5 dorsiflexion/plantarflexion/EHL          2+ DP pulses          calf supple, NT         Abduction pillow in place         SCDs in place    LABS:                        9.9    17.37 )-----------( 255      ( 11 Apr 2019 08:06 )             30.7     04-11    139  |  105  |  16  ----------------------------<  132<H>  4.7   |  28  |  0.85    Ca    9.2      11 Apr 2019 08:06        MEDICATIONS:  Anticoagulation:  apixaban 2.5 milliGRAM(s) Oral <User Schedule>      Pain medications:   acetaminophen   Tablet .. 1000 milliGRAM(s) Oral every 8 hours  gabapentin 300 milliGRAM(s) Oral daily  HYDROmorphone  Injectable 0.5 milliGRAM(s) IV Push every 3 hours PRN  meloxicam 15 milliGRAM(s) Oral daily  topiramate 25 milliGRAM(s) Oral at bedtime  traMADol 50 milliGRAM(s) Oral every 4 hours PRN  traMADol 100 milliGRAM(s) Oral every 6 hours PRN        A/P :   s/p Left THR POD # 1  -    Pain control  -    DVT ppx: Eliquis 2.5mg q 12 h  -    Weight bearing status: WBAT   -    Continue total hip precautions  -    Physical Therapy  -    Occupational Therapy  -    Discharge plan: Rehab tomorrow

## 2019-04-11 NOTE — DISCHARGE NOTE PROVIDER - NSDCCPCAREPLAN_GEN_ALL_CORE_FT
PRINCIPAL DISCHARGE DIAGNOSIS  Diagnosis: Osteoarthritis of left hip  Assessment and Plan of Treatment: Your new total hip replacement requires proper care.  Your surgical care provider is your best resource for information.  Your Physical Therapy /Occupational Therapy will include Ambulation, Transfers , Stairs, ADLs (activities of daily living), range of motion, and isometrics.  Your participation is vital for the fullest recovery and best results.  You may bear full weight as tolerated with rolling walker, cane or assistive device as determined by your therapist.  TOTAL HIP PRECAUTIONS   Do not bend your hip more than 90 degrees.   Do not rotate your leg drastically inward.   Continue abduction pillow while in bed.   Sit in Hip Chair or a chair that does not allow your to bend more than 90 degrees at the hip.  Do not sit on low chairs or low toilet seats.   Keep incision clean and dry.  Change the dressing daily if there is drainage noted.  When there is no drainage the wound may be open to air.   The wound is closed with either sutures (stiches) or Prineo (glued on mesh tape.)  Both sutures and Prineo are removed 2 weeks after surgery at rehab facility or in surgeon's office.  If there is no wet drainage you may shower and pat dry with a clean towel.  Pain medication is to used if needed as prescribed. Constipation may occur from pain medications.  For Constipation :   • Increase your water intake. Drink at least 8 glasses of water daily.  • Try adding fiber to your diet by eating fruits, vegetables and foods that are rich in grains.  • If you do experience constipation, you may take an over-the-counter stool softener/laxative such as Nu Colace, Senekot or Milk of Magnesia.  Call the office with questions.  If you have an emergency call an ambulance or go to the emergency room. PRINCIPAL DISCHARGE DIAGNOSIS  Diagnosis: Osteoarthritis of left hip  Assessment and Plan of Treatment: Physical Therapy /Occupational Therapy Protocol: Posterior THR protocol: Ambulation, Transfers , Stairs, ADLs (activities of daily living), Active & Passive Hip  range of motion, and isometrics.    You may bear full weight as tolerated with rolling walker, cane or assistive device as determined by your therapist.  TOTAL HIP PRECAUTIONS x 6 weeks   Do not bend your hip more than 90 degrees.   Do not rotate your leg drastically inward.   Continue abduction pillow while in bed.   Sit in Hip Chair or a chair that does not allow your to bend more than 90 degrees at the hip.  Do not sit on low chairs or low toilet seats.   Keep incision clean and dry.  Change the dressing daily with ABD & paper tape if there is drainage noted.  When there is no drainage the wound may be open to air. May allow incision to get wet briefly in shower on POD 5 if no drainage present.   The wound is closed with Nylon sutures. Sutures removal at 2 weeks after surgery at rehab facility or in surgeon's office.  For DM: Mealtime Fingerstick glucose orders and insulin coverage orders per Eric MD.   Constipation may occur from pain medications.  For Constipation :   • Increase your water intake. Drink at least 8 glasses of water daily.  • Try adding fiber to your diet by eating fruits, vegetables and foods that are rich in grains.  • If you do experience constipation, you may take an over-the-counter stool softener/laxative such as Nu Colace, Senekot or Milk of Magnesia.  Call the office with questions.  If you have an emergency call an ambulance or go to the emergency room.  ON return home must see your PCP in 2 weeks for Post-Op medical exam and review of medications and any tests.

## 2019-04-11 NOTE — PROGRESS NOTE ADULT - PROBLEM SELECTOR PLAN 1
s/o left total hip replacement day 1  pt/ot  pain control, encourage ambulation, incentive spiroetmer  prlong qt inteval  will place on tele and do daily ekg

## 2019-04-11 NOTE — PROGRESS NOTE ADULT - SUBJECTIVE AND OBJECTIVE BOX
Patient is a 64y old  Female who presents with a chief complaint of       HPI:65y/o morbidly obese female with h/o gout, left  hip OA,HTN, diverticulitis, overactive bladder s/p Interstim insertion 2016 presents with longstanding history of worsening left hip pain, s/p left total hip replacement day 1      SUBJECTIVE & OBJECTIVE: Pt seen and examined at bedside, sp left total hip replacment, mild pain , with prolong qt    PHYSICAL EXAM:  T(C): 36.6 (04-11-19 @ 07:02), Max: 36.6 (04-11-19 @ 07:02)  HR: 75 (04-11-19 @ 07:02) (64 - 81)  BP: 132/79 (04-11-19 @ 11:34) (99/71 - 139/83)  RR: 16 (04-11-19 @ 07:02) (1 - 17)  SpO2: 97% (04-11-19 @ 07:02) (96% - 100%)  Wt(kg): --   GENERAL: NAD, well-groomed, well-developed  HEAD:  Atraumatic, Normocephalic  EYES: EOMI, PERRLA, conjunctiva and sclera clear  ENMT: Moist mucous membranes  NECK: Supple, No JVD  NERVOUS SYSTEM:  Alert & Oriented X3, M  CHEST/LUNG: Clear to auscultation bilaterally; No rales, rhonchi, wheezing, or rubs  HEART: Regular rate and rhythm; No murmurs, rubs, or gallops  ABDOMEN: Soft, Nontender, Nondistended; Bowel sounds present  EXTREMITIES:  2+ Peripheral Pulses, No clubbing, cyanosis, or edema        MEDICATIONS  (STANDING):  acetaminophen   Tablet .. 1000 milliGRAM(s) Oral every 8 hours  allopurinol 300 milliGRAM(s) Oral daily  amLODIPine   Tablet 5 milliGRAM(s) Oral daily  apixaban 2.5 milliGRAM(s) Oral <User Schedule>  ATENolol  Tablet 50 milliGRAM(s) Oral daily  cholestyramine Powder (Sugar-Free) 4 Gram(s) Oral daily  dextrose 5%. 1000 milliLiter(s) (50 mL/Hr) IV Continuous <Continuous>  dextrose 5%. 1000 milliLiter(s) (50 mL/Hr) IV Continuous <Continuous>  dextrose 50% Injectable 12.5 Gram(s) IV Push once  dextrose 50% Injectable 25 Gram(s) IV Push once  dextrose 50% Injectable 25 Gram(s) IV Push once  dextrose 50% Injectable 12.5 Gram(s) IV Push once  dextrose 50% Injectable 25 Gram(s) IV Push once  dextrose 50% Injectable 25 Gram(s) IV Push once  docusate sodium 100 milliGRAM(s) Oral three times a day  gabapentin 300 milliGRAM(s) Oral daily  insulin lispro (HumaLOG) corrective regimen sliding scale   SubCutaneous three times a day before meals  lactated ringers. 1000 milliLiter(s) (125 mL/Hr) IV Continuous <Continuous>  meloxicam 15 milliGRAM(s) Oral daily  pantoprazole    Tablet 40 milliGRAM(s) Oral before breakfast  senna 2 Tablet(s) Oral at bedtime  topiramate 25 milliGRAM(s) Oral at bedtime    MEDICATIONS  (PRN):  aluminum hydroxide/magnesium hydroxide/simethicone Suspension 30 milliLiter(s) Oral four times a day PRN Indigestion  dextrose 40% Gel 15 Gram(s) Oral once PRN Blood Glucose LESS THAN 70 milliGRAM(s)/deciLiter  dextrose 40% Gel 15 Gram(s) Oral once PRN Blood Glucose LESS THAN 70 milliGRAM(s)/deciliter  diphenoxylate/atropine 1 Tablet(s) Oral four times a day PRN Diarrhea  glucagon  Injectable 1 milliGRAM(s) IntraMuscular once PRN Glucose <70 milliGRAM(s)/deciLiter  glucagon  Injectable 1 milliGRAM(s) IntraMuscular once PRN Glucose LESS THAN 70 milligrams/deciliter  HYDROmorphone  Injectable 0.5 milliGRAM(s) IV Push every 3 hours PRN Severe Pain (7 - 10)  magnesium hydroxide Suspension 30 milliLiter(s) Oral daily PRN Constipation  ondansetron Injectable 4 milliGRAM(s) IV Push every 6 hours PRN Nausea and/or Vomiting  polyethylene glycol 3350 17 Gram(s) Oral daily PRN Constipation  traMADol 50 milliGRAM(s) Oral every 4 hours PRN Mild Pain (1 - 3)  traMADol 100 milliGRAM(s) Oral every 6 hours PRN Moderate Pain (4 - 6)      LABS:                        9.9    17.37 )-----------( 255      ( 11 Apr 2019 08:06 )             30.7     04-11    139  |  105  |  16  ----------------------------<  132<H>  4.7   |  28  |  0.85    Ca    9.2      11 Apr 2019 08:06      PT/INR - ( 10 Apr 2019 08:33 )   PT: 12.5 sec;   INR: 1.14 ratio               CAPILLARY BLOOD GLUCOSE      POCT Blood Glucose.: 133 mg/dL (11 Apr 2019 08:13)  POCT Blood Glucose.: 170 mg/dL (10 Apr 2019 21:49)  POCT Blood Glucose.: 124 mg/dL (10 Apr 2019 18:42)  POCT Blood Glucose.: 91 mg/dL (10 Apr 2019 17:31)      CAPILLARY BLOOD GLUCOSE      POCT Blood Glucose.: 133 mg/dL (11 Apr 2019 08:13)  POCT Blood Glucose.: 170 mg/dL (10 Apr 2019 21:49)  POCT Blood Glucose.: 124 mg/dL (10 Apr 2019 18:42)  POCT Blood Glucose.: 91 mg/dL (10 Apr 2019 17:31)    CAPILLARY BLOOD GLUCOSE      POCT Blood Glucose.: 133 mg/dL (11 Apr 2019 08:13)            RECENT CULTURES:      RADIOLOGY & ADDITIONAL TESTS:                        DVT/GI ppx  Discussed with pt @ bedside

## 2019-04-11 NOTE — DISCHARGE NOTE PROVIDER - CARE PROVIDER_API CALL
Gary Lowe)  Orthopaedic Surgery  [address], [address]  [place] [address]  Phone: [phone]  Fax: [phone]  Follow Up Time:

## 2019-04-12 ENCOUNTER — TRANSCRIPTION ENCOUNTER (OUTPATIENT)
Age: 64
End: 2019-04-12

## 2019-04-12 VITALS
SYSTOLIC BLOOD PRESSURE: 105 MMHG | DIASTOLIC BLOOD PRESSURE: 57 MMHG | HEART RATE: 85 BPM | OXYGEN SATURATION: 100 % | TEMPERATURE: 98 F | RESPIRATION RATE: 19 BRPM

## 2019-04-12 LAB
ANION GAP SERPL CALC-SCNC: 10 MMOL/L — SIGNIFICANT CHANGE UP (ref 5–17)
BUN SERPL-MCNC: 20 MG/DL — SIGNIFICANT CHANGE UP (ref 7–23)
CALCIUM SERPL-MCNC: 9.2 MG/DL — SIGNIFICANT CHANGE UP (ref 8.4–10.5)
CHLORIDE SERPL-SCNC: 105 MMOL/L — SIGNIFICANT CHANGE UP (ref 96–108)
CO2 SERPL-SCNC: 26 MMOL/L — SIGNIFICANT CHANGE UP (ref 22–31)
CREAT SERPL-MCNC: 0.87 MG/DL — SIGNIFICANT CHANGE UP (ref 0.5–1.3)
GLUCOSE BLDC GLUCOMTR-MCNC: 104 MG/DL — HIGH (ref 70–99)
GLUCOSE BLDC GLUCOMTR-MCNC: 86 MG/DL — SIGNIFICANT CHANGE UP (ref 70–99)
GLUCOSE SERPL-MCNC: 89 MG/DL — SIGNIFICANT CHANGE UP (ref 70–99)
HCT VFR BLD CALC: 27.3 % — LOW (ref 34.5–45)
HGB BLD-MCNC: 8.5 G/DL — LOW (ref 11.5–15.5)
MCHC RBC-ENTMCNC: 27.7 PG — SIGNIFICANT CHANGE UP (ref 27–34)
MCHC RBC-ENTMCNC: 31.1 GM/DL — LOW (ref 32–36)
MCV RBC AUTO: 88.9 FL — SIGNIFICANT CHANGE UP (ref 80–100)
NRBC # BLD: 0 /100 WBCS — SIGNIFICANT CHANGE UP (ref 0–0)
PLATELET # BLD AUTO: 231 K/UL — SIGNIFICANT CHANGE UP (ref 150–400)
POTASSIUM SERPL-MCNC: 4.1 MMOL/L — SIGNIFICANT CHANGE UP (ref 3.5–5.3)
POTASSIUM SERPL-SCNC: 4.1 MMOL/L — SIGNIFICANT CHANGE UP (ref 3.5–5.3)
RBC # BLD: 3.07 M/UL — LOW (ref 3.8–5.2)
RBC # FLD: 14.5 % — SIGNIFICANT CHANGE UP (ref 10.3–14.5)
SODIUM SERPL-SCNC: 141 MMOL/L — SIGNIFICANT CHANGE UP (ref 135–145)
WBC # BLD: 16.82 K/UL — HIGH (ref 3.8–10.5)
WBC # FLD AUTO: 16.82 K/UL — HIGH (ref 3.8–10.5)

## 2019-04-12 PROCEDURE — 88311 DECALCIFY TISSUE: CPT

## 2019-04-12 PROCEDURE — 99238 HOSP IP/OBS DSCHRG MGMT 30/<: CPT

## 2019-04-12 PROCEDURE — 97116 GAIT TRAINING THERAPY: CPT

## 2019-04-12 PROCEDURE — 73502 X-RAY EXAM HIP UNI 2-3 VIEWS: CPT

## 2019-04-12 PROCEDURE — 82962 GLUCOSE BLOOD TEST: CPT

## 2019-04-12 PROCEDURE — 93010 ELECTROCARDIOGRAM REPORT: CPT

## 2019-04-12 PROCEDURE — 86900 BLOOD TYPING SEROLOGIC ABO: CPT

## 2019-04-12 PROCEDURE — 85027 COMPLETE CBC AUTOMATED: CPT

## 2019-04-12 PROCEDURE — 93005 ELECTROCARDIOGRAM TRACING: CPT

## 2019-04-12 PROCEDURE — 97530 THERAPEUTIC ACTIVITIES: CPT

## 2019-04-12 PROCEDURE — 97535 SELF CARE MNGMENT TRAINING: CPT

## 2019-04-12 PROCEDURE — 97161 PT EVAL LOW COMPLEX 20 MIN: CPT

## 2019-04-12 PROCEDURE — 86850 RBC ANTIBODY SCREEN: CPT

## 2019-04-12 PROCEDURE — C1776: CPT

## 2019-04-12 PROCEDURE — 85610 PROTHROMBIN TIME: CPT

## 2019-04-12 PROCEDURE — C1713: CPT

## 2019-04-12 PROCEDURE — 85014 HEMATOCRIT: CPT

## 2019-04-12 PROCEDURE — 36415 COLL VENOUS BLD VENIPUNCTURE: CPT

## 2019-04-12 PROCEDURE — 86901 BLOOD TYPING SEROLOGIC RH(D): CPT

## 2019-04-12 PROCEDURE — 97110 THERAPEUTIC EXERCISES: CPT

## 2019-04-12 PROCEDURE — 85018 HEMOGLOBIN: CPT

## 2019-04-12 PROCEDURE — 97165 OT EVAL LOW COMPLEX 30 MIN: CPT

## 2019-04-12 PROCEDURE — 80048 BASIC METABOLIC PNL TOTAL CA: CPT

## 2019-04-12 PROCEDURE — 88305 TISSUE EXAM BY PATHOLOGIST: CPT

## 2019-04-12 RX ORDER — ACETAMINOPHEN 500 MG
2 TABLET ORAL
Qty: 0 | Refills: 0 | DISCHARGE
Start: 2019-04-12

## 2019-04-12 RX ORDER — DOCUSATE SODIUM 100 MG
1 CAPSULE ORAL
Qty: 0 | Refills: 0 | DISCHARGE
Start: 2019-04-12

## 2019-04-12 RX ORDER — APIXABAN 2.5 MG/1
1 TABLET, FILM COATED ORAL
Qty: 0 | Refills: 0 | DISCHARGE
Start: 2019-04-12

## 2019-04-12 RX ORDER — TRAMADOL HYDROCHLORIDE 50 MG/1
1 TABLET ORAL
Qty: 0 | Refills: 0 | DISCHARGE
Start: 2019-04-12

## 2019-04-12 RX ORDER — POLYETHYLENE GLYCOL 3350 17 G/17G
17 POWDER, FOR SOLUTION ORAL
Qty: 0 | Refills: 0 | DISCHARGE
Start: 2019-04-12

## 2019-04-12 RX ORDER — SENNA PLUS 8.6 MG/1
2 TABLET ORAL
Qty: 0 | Refills: 0 | DISCHARGE
Start: 2019-04-12

## 2019-04-12 RX ORDER — TOPIRAMATE 25 MG
0 TABLET ORAL
Qty: 0 | Refills: 0 | COMMUNITY

## 2019-04-12 RX ORDER — MELOXICAM 15 MG/1
1 TABLET ORAL
Qty: 0 | Refills: 0 | DISCHARGE
Start: 2019-04-12

## 2019-04-12 RX ORDER — TRAMADOL HYDROCHLORIDE 50 MG/1
2 TABLET ORAL
Qty: 0 | Refills: 0 | DISCHARGE
Start: 2019-04-12

## 2019-04-12 RX ORDER — TRAMADOL HYDROCHLORIDE 50 MG/1
0 TABLET ORAL
Qty: 0 | Refills: 0 | COMMUNITY

## 2019-04-12 RX ADMIN — Medication 1000 MILLIGRAM(S): at 12:54

## 2019-04-12 RX ADMIN — TRAMADOL HYDROCHLORIDE 50 MILLIGRAM(S): 50 TABLET ORAL at 08:47

## 2019-04-12 RX ADMIN — Medication 100 MILLIGRAM(S): at 05:15

## 2019-04-12 RX ADMIN — Medication 1000 MILLIGRAM(S): at 12:52

## 2019-04-12 RX ADMIN — APIXABAN 2.5 MILLIGRAM(S): 2.5 TABLET, FILM COATED ORAL at 08:47

## 2019-04-12 RX ADMIN — PANTOPRAZOLE SODIUM 40 MILLIGRAM(S): 20 TABLET, DELAYED RELEASE ORAL at 05:15

## 2019-04-12 RX ADMIN — TRAMADOL HYDROCHLORIDE 100 MILLIGRAM(S): 50 TABLET ORAL at 03:45

## 2019-04-12 RX ADMIN — MELOXICAM 15 MILLIGRAM(S): 15 TABLET ORAL at 12:53

## 2019-04-12 RX ADMIN — Medication 1000 MILLIGRAM(S): at 03:40

## 2019-04-12 RX ADMIN — Medication 300 MILLIGRAM(S): at 12:52

## 2019-04-12 RX ADMIN — TRAMADOL HYDROCHLORIDE 100 MILLIGRAM(S): 50 TABLET ORAL at 03:06

## 2019-04-12 RX ADMIN — GABAPENTIN 300 MILLIGRAM(S): 400 CAPSULE ORAL at 12:53

## 2019-04-12 RX ADMIN — Medication 100 MILLIGRAM(S): at 12:53

## 2019-04-12 RX ADMIN — Medication 1000 MILLIGRAM(S): at 03:05

## 2019-04-12 NOTE — PROGRESS NOTE ADULT - ASSESSMENT
63y/o morbidly obese female with h/o gout, left  hip OA,HTN, diverticulitis, overactive bladder s/p Interstim insertion 2016 presents with longstanding history of worsening left hip pain, s/p left total hip replacement day 2

## 2019-04-12 NOTE — PROGRESS NOTE ADULT - SUBJECTIVE AND OBJECTIVE BOX
Procedure: Left Post THR 4/10/19  POD#: 2    S: Pt without complaints. No SOB,CP, N/V. Tolerated Fluids / Diet well.   Pain comfortable (4/10 ) on Interval Rx; [+BM], + flatus, No abdominal pain.  acetaminophen   Tablet .. 1000 milliGRAM(s) Oral every 8 hours  gabapentin 300 milliGRAM(s) Oral daily  HYDROmorphone  Injectable 0.5 milliGRAM(s) IV Push every 3 hours PRN  meloxicam 15 milliGRAM(s) Oral daily  ondansetron Injectable 4 milliGRAM(s) IV Push every 6 hours PRN  topiramate 25 milliGRAM(s) Oral at bedtime  traMADol 50 milliGRAM(s) Oral every 4 hours PRN  traMADol 100 milliGRAM(s) Oral every 6 hours PRN    O: General: Pt Alert and oriented, On exam NAD,   VS: Vital Signs Last 24 Hrs  T(C): 36.7 (12 Apr 2019 07:30), Max: 36.7 (12 Apr 2019 07:30)  T(F): 98 (12 Apr 2019 07:30), Max: 98 (12 Apr 2019 07:30)  HR: 82 (12 Apr 2019 07:30) (75 - 82)  BP: 103/66 (12 Apr 2019 07:30) (101/62 - 132/79)  RR: 19 (12 Apr 2019 07:30) (14 - 19)  SpO2: 100% (12 Apr 2019 07:30) (98% - 100%)    Lungs: BS clear bilat.  [Abdomen]: Soft; no distention, benign exam  Ext( Left  Post. Hip): [ Posterior Incision] clean, dry, & intact ; Nylon sutures intact; no  dehiscence; Min. / Mod. STS thigh; No  cellulitis;  [ No fluctuance, No crepitus]  Neurologic: Has sensation bilat. feet & toes ;  Full AROM bilat feet & toes. EHL / AT  = Bilat: 5/5   Vascular: Feet toes warm, pink. DP = 2+. Calves soft ; w/o tenderness bilat..    VTEP: On Bilat. Venodynes + apixaban 2.5 milliGRAM(s) Oral every 12 hours     H.O Prophylaxis: meloxicam 15 milliGRAM(s) Oral daily x Goal 21 Days    Activity in PT yesterday Noted.[Walked 100 ft. with walker]. [Sat up for 2 hours].    Labs Today:  CBC:                      8.5    16.82 )-----------( 231      ( 12 Apr 2019 08:09 )             27.3     Chem: 4/12: Pending    Hospitalist input noted    Primary Orthopedic Assessment:  • Stable from Orthopedic perspective  • Neuro motor exam stable  • Labs:  CBC with Min - Mod Post-Op Anemia - tolerated well with ambulation    Plan:   • Continue:  PT/OT/Weightbearing as tolerated with assistance of a walker/Posterior THR precautions/Ice to hip/ Incentive spirometry encouraged / Meloxicam for H.O  • Continue DVT prophylaxis as prescribed, including use of compression devices and ankle pumps  • Continue Pain Rx  • Posterior THR hip precautions reviewed with patient  • Plans per Medicine   • Discharge planning – anticipated discharge is subacute rehabilitation per patient & family choice when medically stable & cleared by PT/OT

## 2019-04-12 NOTE — PROGRESS NOTE ADULT - PROBLEM SELECTOR PLAN 1
s/o left total hip replacement day 2  pt/ot  pain control, encourage ambulation, incentive spiroetmer  dvt prophalxis  d/c planning

## 2019-04-12 NOTE — DISCHARGE NOTE NURSING/CASE MANAGEMENT/SOCIAL WORK - NSDCDPATPORTLINK_GEN_ALL_CORE
You can access the [facility] Patient Portal, offered by [facility], by registering with the following website: [email or web address]

## 2019-04-12 NOTE — DISCHARGE NOTE NURSING/CASE MANAGEMENT/SOCIAL WORK - NSDCFUADDAPPT_GEN_ALL_CORE_FT
SPOKE WITH ART FROM [facility] CALL [place] REGARDING TO LATEST 5150 HOLD ORDER BY 
PSYCH, FAXED PT'S FACE SHEET AND 5150 HOLD ORDER. FAX CONFIRMATION ATTACHED TO CHART. 4/29/19 at 9:40

## 2019-04-12 NOTE — DISCHARGE NOTE NURSING/CASE MANAGEMENT/SOCIAL WORK - NSDCPNDISPN_GEN_ALL_CORE
Side effects of pain management treatment/Safe use, storage and disposal of opioids when prescribed/Education provided on the pain management plan of care

## 2019-04-12 NOTE — PROGRESS NOTE ADULT - SUBJECTIVE AND OBJECTIVE BOX
Patient is a 64y old  Female who presents with a chief complaint of S/P Left Post THR 4/10/19 (12 Apr 2019 09:46)        HPI:63y/o morbidly obese female with h/o gout, left  hip OA,HTN, diverticulitis, overactive bladder s/p Interstim insertion 2016 presents with longstanding history of worsening left hip pain, s/p left total hip replacement day 2      SUBJECTIVE & OBJECTIVE: Pt seen and examined at bedside, decrease range of moiton of left ip    PHYSICAL EXAM:  T(C): 36.7 (04-12-19 @ 07:30), Max: 36.7 (04-12-19 @ 07:30)  HR: 82 (04-12-19 @ 07:30) (75 - 82)  BP: 103/66 (04-12-19 @ 07:30) (101/62 - 132/79)  RR: 19 (04-12-19 @ 07:30) (14 - 19)  SpO2: 100% (04-12-19 @ 07:30) (98% - 100%)  Wt(kg): --   GENERAL: NAD, well-groomed, well-developed  HEAD:  Atraumatic, Normocephalic  EYES: EOMI, PERRLA, conjunctiva and sclera clear  ENMT: Moist mucous membranes  NECK: Supple, No JVD  NERVOUS SYSTEM:  Alert & Oriented X3, Motor Strength 5/5 B/L upper and lower extremities; DTRs 2+ intact and symmetric  CHEST/LUNG: Clear to auscultation bilaterally; No rales, rhonchi, wheezing, or rubs  HEART: Regular rate and rhythm; No murmurs, rubs, or gallops  ABDOMEN: Soft, Nontender, Nondistended; Bowel sounds present  EXTREMITIES:  2+ Peripheral Pulses, No clubbing, cyanosis, or edema        MEDICATIONS  (STANDING):  acetaminophen   Tablet .. 1000 milliGRAM(s) Oral every 8 hours  allopurinol 300 milliGRAM(s) Oral daily  amLODIPine   Tablet 5 milliGRAM(s) Oral daily  apixaban 2.5 milliGRAM(s) Oral every 12 hours  ATENolol  Tablet 50 milliGRAM(s) Oral daily  cholestyramine Powder (Sugar-Free) 4 Gram(s) Oral daily  dextrose 5%. 1000 milliLiter(s) (50 mL/Hr) IV Continuous <Continuous>  dextrose 5%. 1000 milliLiter(s) (50 mL/Hr) IV Continuous <Continuous>  dextrose 50% Injectable 12.5 Gram(s) IV Push once  dextrose 50% Injectable 25 Gram(s) IV Push once  dextrose 50% Injectable 25 Gram(s) IV Push once  dextrose 50% Injectable 12.5 Gram(s) IV Push once  dextrose 50% Injectable 25 Gram(s) IV Push once  dextrose 50% Injectable 25 Gram(s) IV Push once  docusate sodium 100 milliGRAM(s) Oral three times a day  gabapentin 300 milliGRAM(s) Oral daily  insulin lispro (HumaLOG) corrective regimen sliding scale   SubCutaneous three times a day before meals  lactated ringers. 1000 milliLiter(s) (125 mL/Hr) IV Continuous <Continuous>  meloxicam 15 milliGRAM(s) Oral daily  pantoprazole    Tablet 40 milliGRAM(s) Oral before breakfast  senna 2 Tablet(s) Oral at bedtime  topiramate 25 milliGRAM(s) Oral at bedtime    MEDICATIONS  (PRN):  aluminum hydroxide/magnesium hydroxide/simethicone Suspension 30 milliLiter(s) Oral four times a day PRN Indigestion  bisacodyl Suppository 10 milliGRAM(s) Rectal daily PRN If no bowel movement by POD#2  dextrose 40% Gel 15 Gram(s) Oral once PRN Blood Glucose LESS THAN 70 milliGRAM(s)/deciLiter  dextrose 40% Gel 15 Gram(s) Oral once PRN Blood Glucose LESS THAN 70 milliGRAM(s)/deciliter  diphenoxylate/atropine 1 Tablet(s) Oral four times a day PRN Diarrhea  glucagon  Injectable 1 milliGRAM(s) IntraMuscular once PRN Glucose <70 milliGRAM(s)/deciLiter  glucagon  Injectable 1 milliGRAM(s) IntraMuscular once PRN Glucose LESS THAN 70 milligrams/deciliter  HYDROmorphone  Injectable 0.5 milliGRAM(s) IV Push every 3 hours PRN Severe Pain (7 - 10)  magnesium hydroxide Suspension 30 milliLiter(s) Oral daily PRN Constipation  ondansetron Injectable 4 milliGRAM(s) IV Push every 6 hours PRN Nausea and/or Vomiting  polyethylene glycol 3350 17 Gram(s) Oral daily PRN Constipation  traMADol 50 milliGRAM(s) Oral every 4 hours PRN Mild Pain (1 - 3)  traMADol 100 milliGRAM(s) Oral every 6 hours PRN Moderate Pain (4 - 6)      LABS:                        8.5    16.82 )-----------( 231      ( 12 Apr 2019 08:09 )             27.3     04-12    141  |  105  |  20  ----------------------------<  89  4.1   |  26  |  0.87    Ca    9.2      12 Apr 2019 08:09            CAPILLARY BLOOD GLUCOSE      POCT Blood Glucose.: 86 mg/dL (12 Apr 2019 07:46)  POCT Blood Glucose.: 90 mg/dL (11 Apr 2019 21:12)  POCT Blood Glucose.: 115 mg/dL (11 Apr 2019 18:11)  POCT Blood Glucose.: 226 mg/dL (11 Apr 2019 16:33)  POCT Blood Glucose.: 127 mg/dL (11 Apr 2019 12:27)      CAPILLARY BLOOD GLUCOSE      POCT Blood Glucose.: 86 mg/dL (12 Apr 2019 07:46)  POCT Blood Glucose.: 90 mg/dL (11 Apr 2019 21:12)  POCT Blood Glucose.: 115 mg/dL (11 Apr 2019 18:11)  POCT Blood Glucose.: 226 mg/dL (11 Apr 2019 16:33)  POCT Blood Glucose.: 127 mg/dL (11 Apr 2019 12:27)    CAPILLARY BLOOD GLUCOSE      POCT Blood Glucose.: 86 mg/dL (12 Apr 2019 07:46)            RECENT CULTURES:      RADIOLOGY & ADDITIONAL TESTS:                        DVT/GI ppx  Discussed with pt @ bedside

## 2019-04-29 ENCOUNTER — APPOINTMENT (OUTPATIENT)
Dept: ORTHOPEDIC SURGERY | Facility: CLINIC | Age: 64
End: 2019-04-29
Payer: MEDICARE

## 2019-04-29 VITALS — SYSTOLIC BLOOD PRESSURE: 121 MMHG | HEART RATE: 82 BPM | DIASTOLIC BLOOD PRESSURE: 67 MMHG

## 2019-04-29 PROCEDURE — 73502 X-RAY EXAM HIP UNI 2-3 VIEWS: CPT

## 2019-04-29 PROCEDURE — 99024 POSTOP FOLLOW-UP VISIT: CPT

## 2019-04-30 ENCOUNTER — APPOINTMENT (OUTPATIENT)
Dept: ORTHOPEDIC SURGERY | Facility: CLINIC | Age: 64
End: 2019-04-30
Payer: MEDICARE

## 2019-04-30 VITALS — TEMPERATURE: 98 F | DIASTOLIC BLOOD PRESSURE: 68 MMHG | HEART RATE: 83 BPM | SYSTOLIC BLOOD PRESSURE: 107 MMHG

## 2019-04-30 PROCEDURE — 99024 POSTOP FOLLOW-UP VISIT: CPT

## 2019-05-06 ENCOUNTER — APPOINTMENT (OUTPATIENT)
Dept: ORTHOPEDIC SURGERY | Facility: CLINIC | Age: 64
End: 2019-05-06
Payer: MEDICARE

## 2019-05-06 VITALS — SYSTOLIC BLOOD PRESSURE: 143 MMHG | DIASTOLIC BLOOD PRESSURE: 85 MMHG | HEART RATE: 93 BPM

## 2019-05-06 DIAGNOSIS — Z96.641 PRESENCE OF RIGHT ARTIFICIAL HIP JOINT: ICD-10-CM

## 2019-05-06 PROCEDURE — 99024 POSTOP FOLLOW-UP VISIT: CPT

## 2019-05-06 PROCEDURE — 73502 X-RAY EXAM HIP UNI 2-3 VIEWS: CPT

## 2019-05-14 ENCOUNTER — APPOINTMENT (OUTPATIENT)
Dept: ORTHOPEDIC SURGERY | Facility: CLINIC | Age: 64
End: 2019-05-14
Payer: MEDICARE

## 2019-05-14 VITALS
SYSTOLIC BLOOD PRESSURE: 105 MMHG | BODY MASS INDEX: 53.24 KG/M2 | HEIGHT: 61 IN | TEMPERATURE: 98.3 F | HEART RATE: 86 BPM | DIASTOLIC BLOOD PRESSURE: 71 MMHG | WEIGHT: 282 LBS

## 2019-05-14 PROCEDURE — 99024 POSTOP FOLLOW-UP VISIT: CPT

## 2019-05-14 NOTE — HISTORY OF PRESENT ILLNESS
[___ Weeks Post Op] : [unfilled] weeks post op [Discharge] : noted to have a ~M discharge [Neuro Intact] : an unremarkable neurological exam [Vascular Intact] : ~T peripheral vascular exam normal [Slow Progress] : is progressing slowly [Negative Chema's] : maneuvers demonstrated a negative Chema's sign [Adequate Pain Control] : has adequate pain control [Chills] : no chills [Fever] : no fever [Nausea] : no nausea [Vomiting] : no vomiting [Erythema] : not erythematous [Swelling] : not swollen [Dehiscence] : not dehisced [de-identified] : The patient presents today for follow up surgical wound check. S/P left THR on 4/10/19. [de-identified] : The patient verbalized feeling well overall. Reports daily dressing changes by her daughter. Reports some clear drainage from the wound. Denies fever, chills and body aches. Reports minimal pain, taking Oxycodone 5 MG 1 tablet twice a day as needed. She is getting home physical therapy 2-3 times a week. Ambulating with a walker with a seat. Patient is on Bactrim twice a day. [de-identified] : The patient has antalgic gait ambulating with a walker. The left lateral hip incision site has gauze dressing intact. The two small open wounds on the incision are improving with small amount of clear drainage present. There is no erythema, swelling or heat. No palpable tenderness, hematoma or effusion. Surrounding skin is intact. [de-identified] : No radiographs obtained at this visit. [de-identified] : The left lateral hip surgical wound continues to improve. The wounds were cleansed with normal saline and covered with dry gauze. The patient was advised to continue daily gauze dressing changes as needed, continue on Bactrim as prescribed and continue with physical therapy as recommended. She was advised to call the office promptly if she experiences any worsening symptoms at which time she should come in for evaluation otherwise, she will follow up with us next Tuesday for follow up wound check. The patient verbalized understanding of instructions. All of her questions were addressed to her satisfaction. The patient was advised to call the office at anytime with  further questions or concerns.

## 2019-05-21 ENCOUNTER — APPOINTMENT (OUTPATIENT)
Dept: ORTHOPEDIC SURGERY | Facility: CLINIC | Age: 64
End: 2019-05-21
Payer: MEDICARE

## 2019-05-21 VITALS
WEIGHT: 282 LBS | SYSTOLIC BLOOD PRESSURE: 125 MMHG | TEMPERATURE: 97.8 F | HEART RATE: 94 BPM | DIASTOLIC BLOOD PRESSURE: 77 MMHG | HEIGHT: 61 IN | BODY MASS INDEX: 53.24 KG/M2

## 2019-05-21 PROCEDURE — 99024 POSTOP FOLLOW-UP VISIT: CPT

## 2019-05-21 NOTE — HISTORY OF PRESENT ILLNESS
[2] : the patient reports pain that is 2/10 in severity [Discharge] : noted to have a ~M discharge [Neuro Intact] : an unremarkable neurological exam [Vascular Intact] : ~T peripheral vascular exam normal [Negative Chema's] : maneuvers demonstrated a negative Chema's sign [Slow Progress] : is progressing slowly [Adequate Pain Control] : has adequate pain control [___ Weeks Post Op] : [unfilled] weeks post op [Chills] : no chills [Fever] : no fever [Nausea] : no nausea [Vomiting] : no vomiting [Erythema] : not erythematous [Swelling] : not swollen [Dehiscence] : not dehisced [de-identified] : The patient presents today for follow up surgical wound check. S/P left THR on 4/10/19. [de-identified] : The patient verbalized feeling well overall. continues daily dressing changes by her daughter. Reports reduced clear drainage from the wound. Denies fever, chills and body aches. Denies any odor from the wound. Pain is much improved taking Oxycodone 5 MG 1 tablet twice a day as needed. Continues with PT as recommended. Ambulating with a walker with a seat. Patient is on Bactrim twice a day. [de-identified] : The patient has antalgic gait ambulating with a walker. The left lateral hip incision site has gauze dressing intact. The two small open wounds on the incision with much improvement with scant  amount of clear liquid present. There is no erythema, swelling or heat. No palpable tenderness, hematoma or effusion. Surrounding skin is intact. An undissolved stitch measuring about 3 CM was removed from distal end of the incision without difficulty.  [de-identified] : No radiographs obtained at this visit. [de-identified] : The left lateral hip surgical wound continues to improve. The wounds were cleansed with normal saline and covered with dry gauze. The patient was advised to continue daily gauze dressing changes as needed and continue with physical therapy as recommended. Bactrim was renewed for 7 more days. Patient is noted to have allergy to Celebrex (rash), but states she takes Bactrim without any problems. She was advised to call the office promptly if she experiences any worsening symptoms such as worsening surgical site pain, increased drainage or change in the consistency of the drainage, fever, etc. She will follow up with us next Tuesday for follow up wound check. The patient verbalized understanding of instructions. All of her questions were addressed to her satisfaction. The patient was advised to call the office at anytime with  further questions or concerns.

## 2019-05-28 ENCOUNTER — APPOINTMENT (OUTPATIENT)
Dept: ORTHOPEDIC SURGERY | Facility: CLINIC | Age: 64
End: 2019-05-28
Payer: MEDICARE

## 2019-05-28 VITALS
DIASTOLIC BLOOD PRESSURE: 86 MMHG | BODY MASS INDEX: 53.24 KG/M2 | WEIGHT: 282 LBS | HEART RATE: 89 BPM | HEIGHT: 61 IN | SYSTOLIC BLOOD PRESSURE: 134 MMHG

## 2019-05-28 DIAGNOSIS — T81.31XS DISRUPTION OF EXTERNAL OPERATION (SURGICAL) WOUND, NOT ELSEWHERE CLASSIFIED, SEQUELA: ICD-10-CM

## 2019-05-28 PROCEDURE — 73502 X-RAY EXAM HIP UNI 2-3 VIEWS: CPT

## 2019-05-28 PROCEDURE — 99024 POSTOP FOLLOW-UP VISIT: CPT

## 2019-06-04 ENCOUNTER — APPOINTMENT (OUTPATIENT)
Dept: ORTHOPEDIC SURGERY | Facility: CLINIC | Age: 64
End: 2019-06-04

## 2019-08-13 ENCOUNTER — APPOINTMENT (OUTPATIENT)
Dept: ORTHOPEDIC SURGERY | Facility: CLINIC | Age: 64
End: 2019-08-13
Payer: MEDICARE

## 2019-08-13 VITALS — HEIGHT: 61 IN | DIASTOLIC BLOOD PRESSURE: 81 MMHG | HEART RATE: 92 BPM | SYSTOLIC BLOOD PRESSURE: 123 MMHG

## 2019-08-13 DIAGNOSIS — M16.12 UNILATERAL PRIMARY OSTEOARTHRITIS, LEFT HIP: ICD-10-CM

## 2019-08-13 DIAGNOSIS — Z96.642 PRESENCE OF LEFT ARTIFICIAL HIP JOINT: ICD-10-CM

## 2019-08-13 PROCEDURE — 73502 X-RAY EXAM HIP UNI 2-3 VIEWS: CPT

## 2019-08-13 PROCEDURE — 99213 OFFICE O/P EST LOW 20 MIN: CPT

## 2019-08-13 NOTE — DISCUSSION/SUMMARY
[de-identified] : Patient was seen and evaluated with Dr. Lowe.  Dr. Lowe advised patient to continue her walking and exercise program. She should return to full and we will schedule whichever of her knees is most problematic and the second knee 3-6 months after giving her an opportunity to recover from the first surgery

## 2019-08-13 NOTE — PHYSICAL EXAM
[Antalgic] : antalgic [LE] : Sensory: Intact in bilateral lower extremities [DP] : dorsalis pedis 2+ and symmetric bilaterally [PT] : posterior tibial 2+ and symmetric bilaterally [de-identified] : AP pelvis, shows bilateral total hip replacements in good position with equal leg lengths. All muscle components appear well fixed and anatomically aligned. [de-identified] : A 64-year-old woman status post bilateral total hip replacements, doing very well, ambulating. Her cane. It is much less pain than she had been with her dysplastic hips. Patient has also bilateral knee pain, but will return in the fall for evaluation and possible scheduling for bilateral total knee replacements

## 2019-08-13 NOTE — HISTORY OF PRESENT ILLNESS
[de-identified] : Left total hip replacement 4/10/19. Rt THR 12/19 [Improving] : improving [___ mths] : [unfilled] month(s) ago [0] : a minimum pain level of 0/10 [1] : an average pain level of 1/10 [2] : a maximum pain level of 2/10 [Walking] : walking

## 2019-08-19 ENCOUNTER — APPOINTMENT (OUTPATIENT)
Dept: BARIATRICS/WEIGHT MGMT | Facility: CLINIC | Age: 64
End: 2019-08-19
Payer: MEDICARE

## 2019-08-19 ENCOUNTER — RX RENEWAL (OUTPATIENT)
Age: 64
End: 2019-08-19

## 2019-08-19 VITALS
DIASTOLIC BLOOD PRESSURE: 90 MMHG | BODY MASS INDEX: 53.24 KG/M2 | SYSTOLIC BLOOD PRESSURE: 130 MMHG | WEIGHT: 282 LBS | HEIGHT: 61 IN | HEART RATE: 91 BPM | OXYGEN SATURATION: 98 %

## 2019-08-19 PROCEDURE — 99214 OFFICE O/P EST MOD 30 MIN: CPT

## 2019-08-19 RX ORDER — APIXABAN 5 MG/1
TABLET, FILM COATED ORAL
Refills: 0 | Status: COMPLETED | COMMUNITY
End: 2019-08-19

## 2019-08-19 RX ORDER — SULFAMETHOXAZOLE AND TRIMETHOPRIM 800; 160 MG/1; MG/1
800-160 TABLET ORAL
Qty: 14 | Refills: 0 | Status: COMPLETED | COMMUNITY
Start: 2019-05-21 | End: 2019-08-19

## 2019-08-19 RX ORDER — MELOXICAM 15 MG/1
TABLET ORAL
Refills: 0 | Status: COMPLETED | COMMUNITY
End: 2019-08-19

## 2019-08-19 RX ORDER — NAPROXEN 500 MG/1
500 TABLET ORAL
Qty: 30 | Refills: 0 | Status: COMPLETED | COMMUNITY
Start: 2019-05-28 | End: 2019-08-19

## 2019-08-19 RX ORDER — PHENTERMINE AND TOPIRAMATE 3.75; 23 MG/1; MG/1
3.75-23 CAPSULE, EXTENDED RELEASE ORAL
Qty: 14 | Refills: 0 | Status: COMPLETED | COMMUNITY
Start: 2017-06-05 | End: 2019-08-19

## 2019-08-19 NOTE — ASSESSMENT
[FreeTextEntry1] : Plan:\par Increase activity as tolerated per ortho clearance\par Cont Ozempic 0.5mg per outside institution\par Resume Phentermine 30mg/Topamax 50mg\par Cont to avoid late night eating\par Goal will be to improve sleep quality for circadian rhythm regulation\par \par RTC 3wk

## 2019-08-19 NOTE — REASON FOR VISIT
[Follow-Up Visit] : a follow-up visit for [Diabetes Mellitus] : diabetes mellitus [Obesity] : obesity [Hyperlipidemia] : hyperlipidemia [Hypertension] : hypertension [FreeTextEntry2] : insomnia

## 2019-08-19 NOTE — HISTORY OF PRESENT ILLNESS
[FreeTextEntry1] : Pt s/p band removal March 2017 w/ HTN, DM2, HLD and overactive bladder presents for weight management f/u.\par \par Weight number relatively unchanged w/ what seems to be some fat loss.\par Tolerating Ozempic 0.5mg from outside institution.  Limited to no pain s/p b/l hip replacement for hip dysplasia.  PT 2x wkly using cane no longer requiring walker.  Tries to get more steps in at home.  Per pt outside labs WNL.\par Diet consists of veg and animal protein limiting refined starches.  Drinking protein shakes and yogurt.  Attempts not to eat late or only have a salad if after 8pm.\par Still going to sleep at 2-3a having a busy household.

## 2019-08-19 NOTE — PHYSICAL EXAM
[Obese] : obese [Normal] : affect appropriate [de-identified] : non labored breathing [de-identified] : RR [de-identified] : central adiposity [de-identified] : ambulation w/ cane

## 2019-08-21 ENCOUNTER — APPOINTMENT (OUTPATIENT)
Dept: UROLOGY | Facility: CLINIC | Age: 64
End: 2019-08-21

## 2019-08-28 ENCOUNTER — CHART COPY (OUTPATIENT)
Age: 64
End: 2019-08-28

## 2019-09-04 ENCOUNTER — OUTPATIENT (OUTPATIENT)
Dept: OUTPATIENT SERVICES | Facility: HOSPITAL | Age: 64
LOS: 1 days | End: 2019-09-04
Payer: COMMERCIAL

## 2019-09-04 ENCOUNTER — APPOINTMENT (OUTPATIENT)
Dept: UROLOGY | Facility: CLINIC | Age: 64
End: 2019-09-04
Payer: MEDICARE

## 2019-09-04 DIAGNOSIS — Z90.710 ACQUIRED ABSENCE OF BOTH CERVIX AND UTERUS: Chronic | ICD-10-CM

## 2019-09-04 DIAGNOSIS — N32.81 OVERACTIVE BLADDER: Chronic | ICD-10-CM

## 2019-09-04 DIAGNOSIS — Z98.42 CATARACT EXTRACTION STATUS, LEFT EYE: Chronic | ICD-10-CM

## 2019-09-04 DIAGNOSIS — R35.0 FREQUENCY OF MICTURITION: ICD-10-CM

## 2019-09-04 DIAGNOSIS — Z96.641 PRESENCE OF RIGHT ARTIFICIAL HIP JOINT: Chronic | ICD-10-CM

## 2019-09-04 DIAGNOSIS — Z98.89 OTHER SPECIFIED POSTPROCEDURAL STATES: Chronic | ICD-10-CM

## 2019-09-04 DIAGNOSIS — Z87.442 PERSONAL HISTORY OF URINARY CALCULI: Chronic | ICD-10-CM

## 2019-09-04 PROCEDURE — 76775 US EXAM ABDO BACK WALL LIM: CPT

## 2019-09-04 PROCEDURE — 99213 OFFICE O/P EST LOW 20 MIN: CPT | Mod: 25

## 2019-09-04 PROCEDURE — 76775 US EXAM ABDO BACK WALL LIM: CPT | Mod: 26

## 2019-09-04 NOTE — ASSESSMENT
[FreeTextEntry1] : US today showed 5mm stone in upper pole and 4mm in the lower pole of the right kidney. \par LL on 08/09/19 shows low output.\par Pt advised to increase fluid intake.\par Advised to lose weight by swimming so as not to strain her knees and hips. \par Prescribed VESIcare 10 mg 1 tablet daily. Went over side effects with the pt. \par LL in 5 months and RTO in 6 months for results and US renal

## 2019-09-04 NOTE — HISTORY OF PRESENT ILLNESS
[FreeTextEntry1] : 64 year old female presents for stones f/u.\par Nocturia with increasing of fluid intake\par Has had two hip replacements and scheduled for knee replacements\par \par US today showed 5mm stone in upper pole and 4mm in the lower pole of the right kidney. \par LL on 08/09/19 shows low output.\par Pt advised to increase fluid intake.\par Advised to lose weight by swimming so as not to strain her knees and hips. \par Prescribed VESIcare 10 mg 1 tablet daily. Went over side effects with the pt. \par LL in 5 months and RTO in 6 months for results and US renal

## 2019-09-04 NOTE — ADDENDUM
[FreeTextEntry1] :  I, Tess Melo, acted solely as a scribe for Dr. Jagdeep Watts. The documentation recorded by the scribe accurately reflects the service I personally performed and the decision by me.

## 2019-09-06 DIAGNOSIS — N20.0 CALCULUS OF KIDNEY: ICD-10-CM

## 2019-09-06 DIAGNOSIS — N32.81 OVERACTIVE BLADDER: ICD-10-CM

## 2019-09-06 RX ORDER — SOLIFENACIN SUCCINATE 10 MG/1
10 TABLET ORAL
Qty: 90 | Refills: 3 | Status: DISCONTINUED | COMMUNITY
Start: 2019-09-04 | End: 2019-09-06

## 2019-09-09 ENCOUNTER — RX RENEWAL (OUTPATIENT)
Age: 64
End: 2019-09-09

## 2019-09-16 ENCOUNTER — APPOINTMENT (OUTPATIENT)
Dept: BARIATRICS/WEIGHT MGMT | Facility: CLINIC | Age: 64
End: 2019-09-16
Payer: MEDICARE

## 2019-09-16 VITALS
SYSTOLIC BLOOD PRESSURE: 97 MMHG | DIASTOLIC BLOOD PRESSURE: 60 MMHG | HEART RATE: 85 BPM | HEIGHT: 61 IN | OXYGEN SATURATION: 98 % | BODY MASS INDEX: 51.73 KG/M2 | WEIGHT: 274 LBS

## 2019-09-16 PROCEDURE — 99214 OFFICE O/P EST MOD 30 MIN: CPT

## 2019-09-16 RX ORDER — EXENATIDE 2 MG/.65ML
2 INJECTION, SUSPENSION, EXTENDED RELEASE SUBCUTANEOUS
Qty: 4 | Refills: 3 | Status: DISCONTINUED | COMMUNITY
Start: 2019-02-25 | End: 2019-09-16

## 2019-09-16 RX ORDER — PHENTERMINE HYDROCHLORIDE 15 MG/1
15 CAPSULE ORAL
Qty: 30 | Refills: 0 | Status: DISCONTINUED | COMMUNITY
Start: 2019-02-06 | End: 2019-09-16

## 2019-09-16 RX ORDER — TOPIRAMATE 100 MG/1
100 TABLET, FILM COATED ORAL
Qty: 30 | Refills: 5 | Status: DISCONTINUED | COMMUNITY
Start: 2019-02-06 | End: 2019-09-16

## 2019-09-16 NOTE — REASON FOR VISIT
[Follow-Up Visit] : a follow-up visit for [Diabetes Mellitus] : diabetes mellitus [Hyperlipidemia] : hyperlipidemia [Hypertension] : hypertension [Obesity] : obesity [FreeTextEntry2] : insomnia

## 2019-09-16 NOTE — ASSESSMENT
[FreeTextEntry1] : Plan:\par Cont to focus on whole foods avoiding red meat limiting refined carbs.\par Increase activity now having improved mobility w/ hip replacement\par Practice proper sleep hygiene for improved sleep quality and quantity\par Cont Ozempic 0.5mg from outside institution, refilled Phentermine 30/Topamax 50mg.\par BP controlled denying orthostasis\par \par RTC 3-4wk

## 2019-09-16 NOTE — HISTORY OF PRESENT ILLNESS
[FreeTextEntry1] : Pt s/p band removal March 2017 w/ HTN, DM2, HLD and overactive bladder presents for weight management f/u.\par \par 8lb weight loss.\par Walking more since hip replacement denying pain.  Denies dietary changes limited portions and focusing on whole foods avoiding red meat limiting refined carbs.\par Cont to have interrupted sleep being a "light sleeper".\par Tolerating Ozempic 0.5mg from outside institution.  Also tolerating Phentermine 30/Topamax 50mg.

## 2019-09-16 NOTE — PHYSICAL EXAM
[Obese] : obese [Normal] : affect appropriate [de-identified] : non labored breathing [de-identified] : RR [de-identified] : central adiposity [de-identified] : ambulation w/ cane

## 2019-10-21 ENCOUNTER — APPOINTMENT (OUTPATIENT)
Dept: BARIATRICS/WEIGHT MGMT | Facility: CLINIC | Age: 64
End: 2019-10-21
Payer: MEDICARE

## 2019-10-21 VITALS
HEIGHT: 61 IN | BODY MASS INDEX: 50.98 KG/M2 | SYSTOLIC BLOOD PRESSURE: 120 MMHG | WEIGHT: 270 LBS | OXYGEN SATURATION: 99 % | HEART RATE: 93 BPM | DIASTOLIC BLOOD PRESSURE: 80 MMHG

## 2019-10-21 PROCEDURE — 99214 OFFICE O/P EST MOD 30 MIN: CPT

## 2019-10-21 NOTE — REASON FOR VISIT
[Follow-Up Visit] : a follow-up visit for [Diabetes Mellitus] : diabetes mellitus [Hyperlipidemia] : hyperlipidemia [Hypertension] : hypertension [Metabolic Syndrome] : metabolic syndrome [Obesity] : obesity

## 2019-10-23 ENCOUNTER — RX RENEWAL (OUTPATIENT)
Age: 64
End: 2019-10-23

## 2019-10-24 NOTE — REVIEW OF SYSTEMS
[Negative] : Allergic/Immunologic [MED-ROS-Genituro-FT] : incontinence, chronic [MED-ROS-Musclo-FT] : improved hips, limited by knee pain, requires wlaker [MED-ROS-Psych-FT] : active mind at night

## 2019-10-24 NOTE — HISTORY OF PRESENT ILLNESS
[FreeTextEntry1] : 64F PMH lap-band placement (s/p band removal March 2017), HTN, DM2, HLD, nephrolithiasis and overactive bladder presents for weight management f/u.\par \par Her weight today is 270 lbs with BMI 51.02, decreased from 274 lbs 5 weeks ago, body fat is 146 lbs, from 144.8 lbs.\par \par She is currently on Phentermine 30 mg, Topiramate 50 mg, and reports Ozempic .5 mg from outside source.\par Over the past year she had both hips replaced, and has increased mobility but is still limited by her knees.\par \par Usually 2 meals per day.\par B: Oatmeal or yogurt\par L/D: Fish or chicken and vegetables\par Snacks: fiber 1, keto brownies\par Beverages: vitamin water and coconut water\par \par Exercise: She had hip surgery in December and April which has significantly improved her mobility but still has bilateral knee pain and will need eventual replacement. Goes on walks around the house and does PT exercises. Does not have formal PT or exercise outside of the house.\par \par Sleep: She has mild sleep apnea diagnosed just prior to surgery. Feels that she has poor sleep, with contributors being overactive mind (primary factor) and need to urinate.

## 2019-10-24 NOTE — PHYSICAL EXAM
[Obese, well nourished, in no acute distress] : obese, well nourished, in no acute distress [Normal] : affect appropriate [de-identified] : [place]ampatti 2

## 2019-10-24 NOTE — ASSESSMENT
[FreeTextEntry1] : 64F PMH lap-band placement (s/p band removal March 2017), HTN, DM2, HLD, nephrolithiasis, overactive bladder, and hip dysplasia s/p bilateral replacement who presents for weight management f/u.\par \par # Obesity/Metabolic Syndrome: \par - C/w current regimen of Topiramate 50 mg, Phentermine 30 mg, Ozempic. Would consider increasing Topiramate but given recurrent renal stones will leave this as is.\par - C/w PT exercises, consider formal PT and increased activity\par - Minimize sugary drinks such as coconut water and vitamin water\par - Minimize snacks. Recommend whole food options (ie. apple instead of fiber one bar, nuts instead of keto bar), as patient has had lap band removed.\par \par \par Bariatric Surgery History: S/p Lap band placement and removal\par \par Obesity Co-Morbidities: HTN, HLD, DM2\par \par Anti-Obesity Medications: Topiramate, Phentermine, Ozempic\par \par Obesity Medication Side Effects: None\par

## 2019-10-27 NOTE — H&P PST ADULT - CARDIOVASCULAR
Patient continues to be on a 1:1 for safety and behaviors  Restless this shift and pacing around  Patient is confused, minimally verbal  Compliant with medications and meals  Patient pacing around the unit with 1:1 staff  Will continue to monitor for behaviors and changes  details… detailed exam

## 2019-11-18 ENCOUNTER — APPOINTMENT (OUTPATIENT)
Dept: BARIATRICS/WEIGHT MGMT | Facility: CLINIC | Age: 64
End: 2019-11-18
Payer: MEDICARE

## 2019-11-18 VITALS
SYSTOLIC BLOOD PRESSURE: 130 MMHG | OXYGEN SATURATION: 97 % | WEIGHT: 269 LBS | BODY MASS INDEX: 50.79 KG/M2 | HEIGHT: 61 IN | DIASTOLIC BLOOD PRESSURE: 80 MMHG | HEART RATE: 83 BPM

## 2019-11-18 PROCEDURE — 99214 OFFICE O/P EST MOD 30 MIN: CPT

## 2019-12-01 NOTE — HISTORY OF PRESENT ILLNESS
[FreeTextEntry1] : 64F PMH lap-band placement (s/p band removal March 2017), HTN, DM2, HLD, nephrolithiasis and overactive bladder presents for weight management f/u.\par \par she has lost 1 more lb of fat since last visit though body fat might be down substantially more\par \par She is currently on Phentermine 30 mg, Topiramate 50 mg, and reports Ozempic .5 mg from outside source.\par Over the past year she had both hips replaced, and has increased mobility but is still limited by her knees.\par \par Usually 2 meals per day though some eating has been out due to helping family with various issues\par \par \par Exercise: she has increased physical activity since hip surgeries though has been driving family members around for medical issues and has been less active of late\par \par Sleep: She has mild sleep apnea diagnosed just prior to surgery. Feels that she has poor sleep, with contributors being overactive mind (primary factor) and need to urinate.

## 2019-12-01 NOTE — ASSESSMENT
[FreeTextEntry1] : 64F PMH lap-band placement (s/p band removal March 2017), HTN, DM2, HLD, nephrolithiasis, overactive bladder, and hip dysplasia s/p bilateral replacement who presents for weight management f/u.\par \par # Obesity/Metabolic Syndrome: \par increase topiamate to 100mg\par cont phentermine 30mg\par needs to increase physical activity and resume PT if needed to accomplish this\par \par rtc in 4 weeks.\par \par \par Bariatric Surgery History: S/p Lap band placement and removal\par \par Obesity Co-Morbidities: HTN, HLD, DM2\par \par Anti-Obesity Medications: Topiramate, Phentermine, Ozempic\par \par Obesity Medication Side Effects: None\par

## 2019-12-10 NOTE — PATIENT PROFILE ADULT - DO YOU FEEL THREATENED BY OTHERS?
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 RN arrived at bedside. Patient is unresponsive and imminent. Apnea is present along with mottling and cold extremities. Urine output is dark and minimal. Nail beds are cyanotic. Accessory muscles are being used with each breath. RN spoke with Abbie VILLAFANA about trying dilaudid instead of morphine to help with comfort and symptom management. Abbie states she is going to get dilaudid now and give prior to turning the patient. No other needs, issues or concerns at this time.         Juancarlos Rosario RN     no

## 2019-12-16 ENCOUNTER — APPOINTMENT (OUTPATIENT)
Dept: BARIATRICS/WEIGHT MGMT | Facility: CLINIC | Age: 64
End: 2019-12-16
Payer: MEDICARE

## 2019-12-16 VITALS
SYSTOLIC BLOOD PRESSURE: 130 MMHG | DIASTOLIC BLOOD PRESSURE: 80 MMHG | HEIGHT: 61 IN | OXYGEN SATURATION: 97 % | HEART RATE: 76 BPM | BODY MASS INDEX: 50.22 KG/M2 | WEIGHT: 266 LBS

## 2019-12-16 PROCEDURE — 99214 OFFICE O/P EST MOD 30 MIN: CPT

## 2019-12-16 RX ORDER — HYDROMORPHONE HYDROCHLORIDE 8 MG/1
TABLET ORAL
Refills: 0 | Status: DISCONTINUED | COMMUNITY
End: 2019-12-16

## 2019-12-29 NOTE — HISTORY OF PRESENT ILLNESS
[FreeTextEntry1] : 64F PMH lap-band placement (s/p band removal March 2017), HTN, DM2, HLD, nephrolithiasis and overactive bladder presents for weight management f/u.\par \par Patient has lost 3 pounds since last visit \par She is currently on Phentermine 30 mg, Topiramate 100 mg, and reports Ozempic 0.5 mg from outside source.\par Over the past year she had both hips replaced, and has increased mobility but is still limited by her knees. She needs a wrist surgery and BL knee surgery which she plans to do next year. She purchased a light exercise video which she is doing at home. She is pleased because she is able to move more then she ever could\par \par Usually 2 meals per day, tends to avoid refined carbohydrates \par \par \par Patient's sleep varies. She tends to wake up every 2-3 hours due to pain \par This past weekend she needed to drive her family members around so she did not get good sleep

## 2019-12-29 NOTE — ASSESSMENT
[FreeTextEntry1] : 64F PMH lap-band placement (s/p band removal March 2017), HTN, DM2, HLD, nephrolithiasis, overactive bladder, and hip dysplasia s/p bilateral replacement who presents for weight management f/u.\par \par # Obesity/Metabolic Syndrome: \par Continue topiramate  100mg\par cont phentermine 30mg\par increase physical activity as tolerated\par \par rtc in 4 weeks.\par \par \par Bariatric Surgery History: S/p Lap band placement and removal\par \par Obesity Co-Morbidities: HTN, HLD, DM2\par \par Anti-Obesity Medications: Topiramate, Phentermine, Ozempic\par \par Obesity Medication Side Effects: None\par

## 2020-01-13 ENCOUNTER — APPOINTMENT (OUTPATIENT)
Dept: BARIATRICS/WEIGHT MGMT | Facility: CLINIC | Age: 65
End: 2020-01-13
Payer: MEDICARE

## 2020-01-13 VITALS
SYSTOLIC BLOOD PRESSURE: 110 MMHG | HEIGHT: 61 IN | HEART RATE: 94 BPM | WEIGHT: 266 LBS | OXYGEN SATURATION: 98 % | DIASTOLIC BLOOD PRESSURE: 78 MMHG | BODY MASS INDEX: 50.22 KG/M2

## 2020-01-13 PROCEDURE — 99214 OFFICE O/P EST MOD 30 MIN: CPT

## 2020-01-14 NOTE — ASSESSMENT
[FreeTextEntry1] : 64F PMH lap-band placement (s/p band removal March 2017), HTN, DM2, HLD, nephrolithiasis, overactive bladder, and hip dysplasia s/p bilateral replacement who presents for weight management f/u.\par \par # Obesity/Metabolic Syndrome: \par Continue topiramate 100mg\par cont phentermine 30mg\par increase physical activity as tolerated\par \par rtc in 4 weeks.\par \par \par Bariatric Surgery History: S/p Lap band placement and removal\par \par Obesity Co-Morbidities: HTN, HLD, DM2\par \par Anti-Obesity Medications: Topiramate, Phentermine, Ozempic\par \par Obesity Medication Side Effects: None\par . \par

## 2020-01-14 NOTE — HISTORY OF PRESENT ILLNESS
[FreeTextEntry1] : This is a 65 year old female present in office \par \par Her weight remains the same \par She was sick over melquiades, she stopped medication up until 1 week ago. She is tolerating both phentermine and topiramate well. She usually eats 2 meals a day\par She has been moving better, returned to her home exercises\par

## 2020-03-02 ENCOUNTER — APPOINTMENT (OUTPATIENT)
Dept: BARIATRICS/WEIGHT MGMT | Facility: CLINIC | Age: 65
End: 2020-03-02
Payer: MEDICARE

## 2020-03-02 VITALS
BODY MASS INDEX: 48.71 KG/M2 | SYSTOLIC BLOOD PRESSURE: 130 MMHG | DIASTOLIC BLOOD PRESSURE: 80 MMHG | OXYGEN SATURATION: 97 % | WEIGHT: 258 LBS | HEART RATE: 85 BPM | HEIGHT: 61 IN

## 2020-03-02 PROCEDURE — 99214 OFFICE O/P EST MOD 30 MIN: CPT

## 2020-03-02 NOTE — HISTORY OF PRESENT ILLNESS
[FreeTextEntry1] : 65F PMH lap-band placement (s/p band removal March 2017), HTN, DM2, HLD, nephrolithiasis and overactive bladder presents for weight management f/u.\par \par Weight today: 258 lbs, BMI 48.75\par Weight at last visit (1/13/20): 266 lbs, BMI 50.26, BF 53.6%, 142.8 lbs body fat.\par Weight at initial visit (2/2019): 289 lbs\par Weight changes: Lost 8 lbs in 2.5 months\par \par Med history: She is on Phentermine 30 mg, Topiramate 150 mg, Ozempic\par She takes Gabapentin for numbness s/p tooth extraction 2 years ago.\par She takes Atenolol for HTN. \par \par Denies significant changes to diet since last visit. Eating 2 meals per day, feeling well and more active.\par \par Exercise: Has bilateral hip replacements, has not replaced either knee yet (family needed operations).\par She has been walking around house and yard more frequently, not formally tracking steps. Not currently doing PT.\par \par Sleep: She continues to have difficulty with sleep. She states she is used to years of waking up in pain, her pain is gone now but it is habit to get poor sleep.

## 2020-03-02 NOTE — REASON FOR VISIT
[Diabetes Mellitus] : diabetes mellitus [Hyperlipidemia] : hyperlipidemia [Hypertension] : hypertension [Metabolic Syndrome] : metabolic syndrome [Obesity] : obesity

## 2020-03-02 NOTE — ASSESSMENT
[FreeTextEntry1] : 65F PMH lap-band placement (s/p band removal March 2017), HTN, DM2, HLD, nephrolithiasis and overactive bladder presents for weight management f/u.\par \par # Obesity s/p lap-band placement/removal c/b HTN, DM2, HLD: Weight today: 258 lbs, BMI 48.75, she has lost 8 lbs since her last visit 2.5 months ago. She has maintained her regimen of Phentermine 30 mg, Topiramate 150 mg, Ozempic. Looks and feels well, sleep schedule remains poor. Of note, she is on Atenolol for HTN, and Gabapentin for numbness following a prior dental procedure.\par - C/w Topiramate and Ozempic as prescribed.\par - Discussed tapering Gabapentin w/ patient, as she no longer has complaint for which it is indicated\par - Discussed coming off Atenolol for BP control, as BB can increase weight (Coreg less) and may also affect efficacy of Phentermine. Patient will discuss coming off Atenolol w/PMD and we will likely reduce Phentermine to 15 mg at that time, with possible increase in the future. This may also help sleep.\par - Patient will look into step-tracking devices\par - Rec food log\par - Continued to discuss sleep hygiene\par \par \par \par Bariatric Surgery History: S/p Lap band placement and removal\par \par Obesity Co-Morbidities: HTN, HLD, DM2\par \par Anti-Obesity Medications: Topiramate, Phentermine, Ozempic\par \par Obesity Medication Side Effects: None

## 2020-03-13 ENCOUNTER — OUTPATIENT (OUTPATIENT)
Dept: OUTPATIENT SERVICES | Facility: HOSPITAL | Age: 65
LOS: 1 days | End: 2020-03-13
Payer: COMMERCIAL

## 2020-03-13 ENCOUNTER — APPOINTMENT (OUTPATIENT)
Dept: UROLOGY | Facility: CLINIC | Age: 65
End: 2020-03-13
Payer: MEDICARE

## 2020-03-13 DIAGNOSIS — Z98.42 CATARACT EXTRACTION STATUS, LEFT EYE: Chronic | ICD-10-CM

## 2020-03-13 DIAGNOSIS — Z96.641 PRESENCE OF RIGHT ARTIFICIAL HIP JOINT: Chronic | ICD-10-CM

## 2020-03-13 DIAGNOSIS — Z87.442 PERSONAL HISTORY OF URINARY CALCULI: Chronic | ICD-10-CM

## 2020-03-13 DIAGNOSIS — Z90.710 ACQUIRED ABSENCE OF BOTH CERVIX AND UTERUS: Chronic | ICD-10-CM

## 2020-03-13 DIAGNOSIS — R35.0 FREQUENCY OF MICTURITION: ICD-10-CM

## 2020-03-13 DIAGNOSIS — Z98.89 OTHER SPECIFIED POSTPROCEDURAL STATES: Chronic | ICD-10-CM

## 2020-03-13 DIAGNOSIS — N32.81 OVERACTIVE BLADDER: Chronic | ICD-10-CM

## 2020-03-13 PROCEDURE — 99213 OFFICE O/P EST LOW 20 MIN: CPT | Mod: 25

## 2020-03-13 PROCEDURE — 76775 US EXAM ABDO BACK WALL LIM: CPT | Mod: 26

## 2020-03-13 PROCEDURE — 76775 US EXAM ABDO BACK WALL LIM: CPT

## 2020-03-13 NOTE — PHYSICAL EXAM

## 2020-03-13 NOTE — ASSESSMENT
[FreeTextEntry1] : US today again visualized nonobstructing stones in the right kidney lower pole 4 mm and upper pole 5 mm Both kidneys are normal in size and echogenicity without hydronephrosis or solid masses visualized. \par LL on 03/03/2020 shows slightly low output, SS CaOx, high Ca, and SS CaP\par Pt was advised to increase fluid intake. \par LL in 5 months and RTO in 6 months for results and US

## 2020-03-13 NOTE — HISTORY OF PRESENT ILLNESS
[FreeTextEntry1] : 65 year old female presents for evaluation of test results. \par Nocturia with increasing of fluid intake\par Has had two hip replacements and is scheduled for knee replacements\par Pt has lost 100lbs. \par \par US today again visualized nonobstructing stones in the right kidney lower pole 4 mm and upper pole 5 mm Both kidneys are normal in size and echogenicity without hydronephrosis or solid masses visualized. \par LL on 03/03/2020 shows slightly low output, SS CaOx, high Ca, and SS CaP\par Pt was advised to increase fluid intake. \par LL in 5 months and RTO in 6 months for results and US

## 2020-03-13 NOTE — ADDENDUM
[FreeTextEntry1] :  I, Kaitlin [place], acted solely as a scribe for Dr. Jagdeep Watts. The documentation recorded by the scribe accurately reflects the service I personally performed and the decision by me.\par

## 2020-03-19 DIAGNOSIS — N20.0 CALCULUS OF KIDNEY: ICD-10-CM

## 2020-04-06 ENCOUNTER — APPOINTMENT (OUTPATIENT)
Dept: BARIATRICS/WEIGHT MGMT | Facility: CLINIC | Age: 65
End: 2020-04-06
Payer: MEDICARE

## 2020-04-06 PROCEDURE — G2012 BRIEF CHECK IN BY MD/QHP: CPT

## 2020-05-29 ENCOUNTER — APPOINTMENT (OUTPATIENT)
Dept: BARIATRICS/WEIGHT MGMT | Facility: CLINIC | Age: 65
End: 2020-05-29
Payer: MEDICARE

## 2020-05-29 VITALS — WEIGHT: 260 LBS | BODY MASS INDEX: 49.13 KG/M2

## 2020-05-29 PROCEDURE — 99443: CPT

## 2020-06-12 ENCOUNTER — APPOINTMENT (OUTPATIENT)
Dept: DISASTER EMERGENCY | Facility: CLINIC | Age: 65
End: 2020-06-12

## 2020-06-12 NOTE — PATIENT PROFILE ADULT - BRADEN MOISTURE
3) occasionally moist

## 2020-06-13 LAB — SARS-COV-2 N GENE NPH QL NAA+PROBE: NOT DETECTED

## 2020-06-29 ENCOUNTER — APPOINTMENT (OUTPATIENT)
Dept: BARIATRICS/WEIGHT MGMT | Facility: CLINIC | Age: 65
End: 2020-06-29
Payer: MEDICARE

## 2020-06-29 VITALS — WEIGHT: 257 LBS | BODY MASS INDEX: 48.56 KG/M2

## 2020-06-29 DIAGNOSIS — Z87.898 PERSONAL HISTORY OF OTHER SPECIFIED CONDITIONS: ICD-10-CM

## 2020-06-29 DIAGNOSIS — Z01.818 ENCOUNTER FOR OTHER PREPROCEDURAL EXAMINATION: ICD-10-CM

## 2020-06-29 PROCEDURE — 99442: CPT

## 2020-07-21 ENCOUNTER — APPOINTMENT (OUTPATIENT)
Dept: BARIATRICS/WEIGHT MGMT | Facility: CLINIC | Age: 65
End: 2020-07-21
Payer: MEDICARE

## 2020-07-21 PROCEDURE — 99443: CPT

## 2020-07-21 RX ORDER — CARVEDILOL 25 MG/1
25 TABLET, FILM COATED ORAL
Refills: 0 | Status: ACTIVE | COMMUNITY
Start: 2020-07-21

## 2020-07-21 RX ORDER — ATENOLOL 50 MG/1
50 TABLET ORAL
Refills: 0 | Status: DISCONTINUED | COMMUNITY
End: 2020-07-21

## 2020-07-21 RX ORDER — TOPIRAMATE 100 MG/1
100 TABLET, FILM COATED ORAL
Qty: 30 | Refills: 5 | Status: DISCONTINUED | COMMUNITY
Start: 2019-11-18 | End: 2020-07-21

## 2020-07-21 NOTE — HISTORY OF PRESENT ILLNESS
[Home] : at home, [unfilled] , at the time of the visit. [Medical Office: ([facility])___] : at the medical office located in  [FreeTextEntry1] : 65F PMH Obesity (s/p lap-band, removed March 2017), HTN, DM2, HLD, recent cataract surgery,  nephrolithiasis and overactive bladder, who presents today for weight management f/u. \par \par She was last seen in-person on 3/2, and last telephone visit was 6/29 with Dr. Roberts.\par \par Weight today: 257 lbs. \par She states this is the lightest weight she has been at in years, she is active and feels well.\par \par Meds: Phentermine 30 mg, Topiramate 50 mg, Ozempic. Denies side effects. \par She spoke to her doctor who switched atenolol to carvedilol, her BP has been well controlled.\par She was on Gabapentin for years for jaw numbness she had years ago and no longer has, she went to the doctor who decreased it from 300 mg to 100 mg. Otherwise notes no pain in her hips and knees, is mobile, no numbness except occasional mildly in her lower legs/feet. \par \par She has a history of kidney stones and currently w/non-obstructing kidney stones, followed by Urology regularly with imaging and advised to increase fluid intake.\par Discussed with patient risks and benefits of Topiramate. She has been on this medication for many years with positive effect on weight (now at her lowest weight in years), dose is very low and may specifically be beneficial for her poorly regulated sleep and for night eating, and it is often very effective in patients w/previous lap band removal. Risk of weight regain is higher in this patient with removal. However, Topiramate may increase risk of kidney stones, this was discussed with patient. Possible medication removal or replacement was discussed with patient. She has follow-up repeat US and Urology appointment in 1.5 months.\par \par Diet: Eats smaller amounts throughout the day. Avocado, salads, protein shakes. Tries not to eat after 8 pm. She is drinking coconut water frequently, and water throughout the day.\par \par Exercise - Has been more active, going on walks, knees and hips feel well. \par \par Sleep: Goes to bed at 11 pm but lays awake until 3-4 am. Gets up at 8 am. States she takes her Phentermine when she gets up at 8 am, she takes topiramate 50 mg, Gabapentin 100 mg, and Carvedilol at 8 pm. She has always had sleep issues. She tried reducing screen time at night. Does not drink caffeine.

## 2020-07-21 NOTE — ASSESSMENT
[FreeTextEntry1] : 65F PMH Obesity (s/p lap-band, removed March 2017), HTN, DM2, HLD, recent cataract surgery,  nephrolithiasis and overactive bladder, who presents today for weight management f/u. \par \par # Obesity s/p lap-band placement/removal, HTN, HLD, DM2: Reports ~257 lbs and is at her lowest weight in years. Feels great, is more active, no pain in hips or knees. On Phentermine 30 mg, Topiramate 50 mg, Ozempic. Reduced her Gabapentin and switched Atenolol to Carvedilol. \par - Try to minimize liquid/sugar calories, rec water instead of coconut water\par - Continue to drink plenty of fluids\par - C/w regular activity and walks, track steps\par - Discussed sleep hygiene, can read from non-screen sources (books, newspapers) in the evening.\par - Will trial melatonin\par - Drinks Premium Protein shakes, will discuss trying to find alternatives (whole legumes, or plain-unsweetened proteins w/o artificial sweeteners) at next visit. \par - Patient will re-discuss w/doctor entirely removing Gabapentin\par - Patient has been on Topiramate for many years, right now on low-dose 50 mg QHS. She has documented non-obstructing kidney stones, unlikely due to Topiramate since she has prior history,  however Topiramate may slightly increase risk of renal stones in adults. This was discussed at length with patient. She currently has an Ultrasound and Urology appointment scheduled in 1.5 months. We made a joint decision to c/w Topiramate at 50 mg dose and follow-up results of ultrasound. Discussed the importance of hydration. If we decide to remove Topiramate, we may trial Orlistat.\par \par \par \par Bariatric Surgery History: Lap band placement and removal (3/2017)\par \par Obesity Comorbidities: DM2, HTN, HLD\par \par Obesity Comorbidity resolution or improvement: BP improving\par \par Anti-Obesity Medications: Ozempic, Phentermine 30 mg, Topiramate 50 mg\par \par Obesity Medication Side Effects: renal stones (possibly increased risk from Topiramate)

## 2020-09-14 ENCOUNTER — APPOINTMENT (OUTPATIENT)
Dept: UROLOGY | Facility: CLINIC | Age: 65
End: 2020-09-14
Payer: MEDICARE

## 2020-09-14 ENCOUNTER — OUTPATIENT (OUTPATIENT)
Dept: OUTPATIENT SERVICES | Facility: HOSPITAL | Age: 65
LOS: 1 days | End: 2020-09-14
Payer: COMMERCIAL

## 2020-09-14 DIAGNOSIS — Z87.442 PERSONAL HISTORY OF URINARY CALCULI: Chronic | ICD-10-CM

## 2020-09-14 DIAGNOSIS — Z96.641 PRESENCE OF RIGHT ARTIFICIAL HIP JOINT: Chronic | ICD-10-CM

## 2020-09-14 DIAGNOSIS — Z98.42 CATARACT EXTRACTION STATUS, LEFT EYE: Chronic | ICD-10-CM

## 2020-09-14 DIAGNOSIS — Z90.710 ACQUIRED ABSENCE OF BOTH CERVIX AND UTERUS: Chronic | ICD-10-CM

## 2020-09-14 DIAGNOSIS — R35.0 FREQUENCY OF MICTURITION: ICD-10-CM

## 2020-09-14 DIAGNOSIS — Z98.89 OTHER SPECIFIED POSTPROCEDURAL STATES: Chronic | ICD-10-CM

## 2020-09-14 DIAGNOSIS — N32.81 OVERACTIVE BLADDER: Chronic | ICD-10-CM

## 2020-09-14 PROCEDURE — 76775 US EXAM ABDO BACK WALL LIM: CPT | Mod: 26

## 2020-09-14 PROCEDURE — 99442: CPT | Mod: 25

## 2020-09-14 PROCEDURE — 76775 US EXAM ABDO BACK WALL LIM: CPT

## 2020-09-18 DIAGNOSIS — Z87.442 PERSONAL HISTORY OF URINARY CALCULI: ICD-10-CM

## 2020-10-13 ENCOUNTER — APPOINTMENT (OUTPATIENT)
Dept: BARIATRICS/WEIGHT MGMT | Facility: CLINIC | Age: 65
End: 2020-10-13
Payer: MEDICARE

## 2020-10-13 PROCEDURE — 99214 OFFICE O/P EST MOD 30 MIN: CPT | Mod: 95

## 2020-10-13 NOTE — ASSESSMENT
[FreeTextEntry1] : 65F PMH lap-band placement (s/p band removal March 2017), HTN, DM2, HLD, nephrolithiasis and overactive bladder presents for weight management f/u.\par \par # Obesity (s/p lap band and removal 3/2017) c/b HTN, DM2, HLD: weight today 262 lbs, from 257 last visit (6/21) in the setting of decreased physical activity. \par - track food\par - discussed increasing activity and re-engaging in PT exercises\par - Discussed following up urine studies from her urologist regarding topiramate usage. For now will continue at 50 mg QHS\par - C/w phentermine 30 mg for now\par - Patient temporarily discontinued Ozempic .5 mg with BMP reading of glucose 55. As this medication may be preferable to topiramate (kidney stones), phentermine (stimulant) and she has a history of DM2. GLP-1 less likely to cause hypoglycemia than most anti-diabetics as insulin response is proportional to level of hypoglycemia, however I would likely discontinue this medication if FS is ~55. A1c remains pre-diabetic, this may be an inaccurate measurement. I recommend starting to check AM finger sticks and intermittently at other times, and if FS are normal/elevated, re-starting Ozempic at .25 mg, and continuing to monitor finger sticks so that we have more data.\par \par \par \par Bariatric Surgery History: Lap band placement and removal (3/2017)\par \par Obesity Comorbidities: DM2, HTN, HLD\par \par Obesity Comorbidity resolution or improvement: HTN improved, diabetic control improved.\par \par Anti-Obesity Medications: Ozempic, Phentermine 30 mg, Topiramate 50 mg\par \par Obesity Medication Side Effects: None (hx renal stones, not specifically due to Topiramate)
No

## 2020-10-13 NOTE — HISTORY OF PRESENT ILLNESS
[Home] : at home, [unfilled] , at the time of the visit. [Medical Office: ([facility])___] : at the medical office located in  [Verbal consent obtained from patient] : the patient, [unfilled] [FreeTextEntry1] : 65F PMH lap-band placement (s/p band removal March 2017), HTN, DM2, HLD, nephrolithiasis and overactive bladder presents for weight management f/u.\par \par Weight today: 262 lbs\par Weight at last visit (7/21/20): 257 lbs. \par \par Meds: We switched her atenolol to carvedilol and titrated off her gabapentin. She has been on Phentermine 30 mg, Topiramate 50 mg, Ozempic w/o reported side effects. \par She has followed with Urology regarding her kidney stones, she has a small kidney stone still in her R kidney, and appears to have passed the other, her L kidney is unremarkable. She did urine studies and is pending results, although since results still have not returned she will call the clinic to make sure the lab does not need another sample.  \par She notes that she stopped taking Ozempic last week as her blood sugar was low on BMP at her primary doctor (glucose 55), she denied symptoms, notes that she had actually missed her dose 2 days earlier that week as well. \par \par She has been less physically active recently with COVID, her knee is still pending surgery, she has not been doing PT. She is able to do short distances without cane and longer distances with rolling walker. \par \par Continues to endorse chronic poor sleep quality.

## 2020-10-27 ENCOUNTER — APPOINTMENT (OUTPATIENT)
Dept: BARIATRICS/WEIGHT MGMT | Facility: CLINIC | Age: 65
End: 2020-10-27
Payer: MEDICARE

## 2020-10-27 VITALS
HEIGHT: 61 IN | SYSTOLIC BLOOD PRESSURE: 132 MMHG | HEART RATE: 96 BPM | WEIGHT: 262.25 LBS | OXYGEN SATURATION: 97 % | DIASTOLIC BLOOD PRESSURE: 90 MMHG | BODY MASS INDEX: 49.51 KG/M2

## 2020-10-27 PROCEDURE — 99214 OFFICE O/P EST MOD 30 MIN: CPT | Mod: 25

## 2020-10-27 PROCEDURE — 99072 ADDL SUPL MATRL&STAF TM PHE: CPT

## 2020-10-27 NOTE — HISTORY OF PRESENT ILLNESS
[FreeTextEntry1] : 65F PMH lap-band placement (s/p band removal March 2017), HTN, DM2, HLD, nephrolithiasis and overactive bladder presents for weight management f/u.\par \par Weight today: 262 lbs 4 oz, BMI 49.55\par Weight at last visit (10/13/20): reported 262 lbs. \par \par Last appointment she noted that she had blood work by her primary care doctor that showed Glucose 55 on her metabolic panel, she discontinued Ozempic. She noted no episodes of hypoglycemic symptoms. Patient checked FS off medication without hypoglycemia. Patient restarted medication and checked FS each morning with no episodes of hypoglycemia. She is on Topiramate 50 mg, she is awaiting results of urine studies from her urologist.\par \par Patient has chronic sleep issues and stressors. Notes that she raised 6 adopted children who were born to mothers with drug abuse, and the stressors of trying to guide them. One of her daughters got a nursing degree 4 years ago and hasn't worked, she seems less interested in getting work than socializing and she is uncertain what to do. She is also taking care of her mother who has bronchopulmonary pneumonia.

## 2020-10-27 NOTE — ASSESSMENT
[FreeTextEntry1] : 65F PMH lap-band placement (s/p band removal March 2017), HTN, DM2, HLD, nephrolithiasis and overactive bladder presents for weight management f/u.\par \par # Obesity s/p lap-band removal (3/2017) c/b HTN, DM2, HLD: Weight today:262 lbs 4 oz, BMI 49.55, patient with slight weight gain. She restarted Ozempic and is not having hypoglycemic episodes. Pending results of urine studies. Discussed lifelong stressors she has dealt which may be affecting her sleep, diet, activity, and weight.\par - C/w Ozempic, Phentermine, Topiramate 50 mg, follow-up with Urology about urine study results.\par - Discussed looking into mindfulness techniques for stress reduction and improve sleep/restfulness. Will discuss further at next visit.\par - Track food.\par - Patient may benefit from re-engaging in PT, re-engage in work-out tapes\par \par \par \par Bariatric Surgery History: Lap band placement and removal (3/2017)\par \par Obesity Comorbidities: DM2, HTN, HLD\par \par Obesity Comorbidity resolution or improvement: HTN improved, diabetic control improved.\par \par Anti-Obesity Medications: Ozempic, Phentermine 30 mg, Topiramate 50 mg\par \par Obesity Medication Side Effects: None (hx renal stones, not specifically due to Topiramate).

## 2020-11-16 ENCOUNTER — RX RENEWAL (OUTPATIENT)
Age: 65
End: 2020-11-16

## 2021-01-04 NOTE — ASSESSMENT
[FreeTextEntry1] : \par \par \par \par \par \par \par Bariatric Surgery History: Lap band placement and removal (3/2017)\par \par Obesity Comorbidities: DM2, HTN, HLD\par \par Obesity Comorbidity resolution or improvement: HTN improved, diabetic control improved.\par \par Anti-Obesity Medications: Ozempic, Phentermine 30 mg, Topiramate 50 mg\par \par Obesity Medication Side Effects: None (hx renal stones, not specifically due to Topiramate).

## 2021-01-04 NOTE — HISTORY OF PRESENT ILLNESS
[FreeTextEntry1] : 65F PMH lap-band placement (s/p band removal March 2017), HTN, DM2, HLD, nephrolithiasis and overactive bladder presents for weight management f/u.\par \par Weight today: \par Weight at last visit (10/27/20): 262 lbs 4 oz, BMI 49.55\par \par She is on Topiramate 50 mg, she is awaiting results of urine studies from her urologist.\par \par (Patient has chronic sleep issues and stressors. Notes that she raised 6 adopted children who were born to mothers with drug abuse, and the stressors of trying to guide them. One of her daughters got a nursing degree 4 years ago and hasn't worked, she seems less interested in getting work than socializing and she is uncertain what to do. She is also taking care of her mother who has bronchopulmonary pneumonia)\par \par - C/w Ozempic, Phentermine, Topiramate 50 mg, follow-up with Urology about urine study results.\par - Discussed looking into mindfulness techniques for stress reduction and improve sleep/restfulness. Will discuss further at next visit.\par - Track food.\par - Patient may benefit from re-engaging in PT, re-engage in work-out tapes

## 2021-01-05 ENCOUNTER — APPOINTMENT (OUTPATIENT)
Dept: BARIATRICS/WEIGHT MGMT | Facility: CLINIC | Age: 66
End: 2021-01-05
Payer: MEDICARE

## 2021-01-11 ENCOUNTER — APPOINTMENT (OUTPATIENT)
Dept: BARIATRICS/WEIGHT MGMT | Facility: CLINIC | Age: 66
End: 2021-01-11
Payer: MEDICARE

## 2021-01-11 PROCEDURE — 99442: CPT

## 2021-01-11 NOTE — ASSESSMENT
[FreeTextEntry1] : 66F PMH obesity (s/p lap-band placement and removal March 2017), HTN, DM2, HLD, nephrolithiasis and overactive bladder presents for weight management f/u.\par \par # Obesity (s/p lap-band placement and removal March 2017) c/b DM2, HTN, HLD, nephrolithiasis: No recent weights, has been very busy and not attending to her own health but has been on her feet. Currently on Ozempic .25, Topiramate 50 mg, Phentermine 30 mg. Chronic poor sleep.  \par - Food log\par - F/u urologist regarding studies\par - C/w topiramate 50 mg QHS for now. C/w Phentermine 30 mg. C/w Ozempic, may increase to 0.5.\par - Patient would benefit from sleep hygiene and stress-reduction/mindfulness techniques, currently she is overwhelmed with the acuity of her mother's health conditions, but would benefit from revisiting these issues.\par - May benefit from re-engaging in PT or with work-out tapes, we will re-address this at future visit when things settle down.\par \par \par \par Bariatric Surgery History: Lap band placement and removal (3/2017)\par \par Obesity Comorbidities: DM2, HTN, HLD\par \par Obesity Comorbidity resolution or improvement: HTN improved, diabetic control improved.\par \par Anti-Obesity Medications: Ozempic, Phentermine 30 mg, Topiramate 50 mg\par \par Obesity Medication Side Effects: None (hx renal stones, not specifically due to Topiramate).

## 2021-01-11 NOTE — HISTORY OF PRESENT ILLNESS
[Home] : at home, [unfilled] , at the time of the visit. [Medical Office: ([facility])___] : at the medical office located in  [Verbal consent obtained from patient] : the patient, [unfilled] [FreeTextEntry1] : 66F PMH obesity (s/p lap-band placement and removal March 2017), HTN, DM2, HLD, nephrolithiasis and overactive bladder presents for weight management f/u.\par \par Weight today: Has not weighed herself recently\par Weight at last visit (10/27/20): 262 lbs 4 oz, BMI 49.55\par  \par She has been running around taking care of her mother who has CHF requiring oxygen therapy, She is bringing her to get an x-ray and will need a potential hospital readmission. She doesn't feel she's had time to weigh herself or take care of herself. \par No formal exercise but has been on her feet more, taking care of her mother. \par \par Sleeping 3 am to 7 am. Averaging 2-4 hours.\par She is planning to make an appointment to follow-up with her Urologist. \par She is on Ozempic .25, phentermine 30 mg, Topiramate 50 mg, she is awaiting results of urine studies from her urologist.

## 2021-01-14 ENCOUNTER — TRANSCRIPTION ENCOUNTER (OUTPATIENT)
Age: 66
End: 2021-01-14

## 2021-02-05 NOTE — ANESTHESIA FOLLOW-UP NOTE - NSINTERVIEW_GEN_ALL_CORE
February 5, 2021       Patricia Allen MD  [address] [facility] [address]  Via In Basket      Patient: Jonathan Bucio   YOB: 1957   Date of Visit: 2/5/2021       Dear Dr. Allen:    Thank you for referring Jonathan Bucio to me for evaluation. Below are my notes for this visit with him.    If you have questions, please do not hesitate to call me. I look forward to following your patient along with you.      Sincerely,        Jeanette Pinedo MD        CC: No Recipients  Jeanette Pinedo MD  2/5/2021 12:56 PM  Signed                                   Cardiology Office Visit      Chief complaint  Chief Complaint   Patient presents with   • Follow-up     3 month follow up, patient denies any cardiac complaints. PCP Dr. Patricia Allen        HPI:   Patient here for follow up appointment.     The patient is doing well. He notes that Vascepa was not approved. Patient has not followed with his PCP regarding elevated LFT's. In addition, patient did not have repeat lipid panel improved. He admits to coughing at times while he is asleep. Patient denies any regular exercise regimen.     Unfortunately, he continues to smoke tobacco.  Does not want me to discuss smoking cessation with him again.    The patient denies complaints of chest pain, orthopnea or PND, palpitations, worsening lower extremity edema, TIA or strokelike symptoms, claudication, presyncope or syncope.     Review of Systems  General: No fever or chills, no loss of appetite.    No cough or hemoptysis  No GI or  disturbances  No skin disorders or blood dyscrasias.  Remainder of systems reviewed and negative.        Past Medical History:   Diagnosis Date   • CAD (coronary artery disease)    • Essential (primary) hypertension    • H/O hernia repair      Past Surgical History:   Procedure Laterality Date   • Coronary stent placement  09/09/2013    [facility]   • Gallbladder surgery     • Hernia repair     • Inner ear surgery     • Knee  Interviewed and evaluated arthroscopy w/ meniscal repair     • Stent implant       No family history on file.  Social History     Socioeconomic History   • Marital status: /Civil Union     Spouse name: Not on file   • Number of children: Not on file   • Years of education: Not on file   • Highest education level: Not on file   Occupational History   • Not on file   Social Needs   • Financial resource strain: Not on file   • Food insecurity     Worry: Not on file     Inability: Not on file   • Transportation needs     Medical: Not on file     Non-medical: Not on file   Tobacco Use   • Smoking status: Current Every Day Smoker     Packs/day: 1.00     Years: 45.00     Pack years: 45.00     Types: Cigarettes   • Smokeless tobacco: Never Used   Substance and Sexual Activity   • Alcohol use: Never     Frequency: Never   • Drug use: Never   • Sexual activity: Not on file   Lifestyle   • Physical activity     Days per week: Not on file     Minutes per session: Not on file   • Stress: Not on file   Relationships   • Social connections     Talks on phone: Not on file     Gets together: Not on file     Attends [employer] service: Not on file     Active member of club or organization: Not on file     Attends meetings of clubs or organizations: Not on file     Relationship status: Not on file   • Intimate partner violence     Fear of current or ex partner: Not on file     Emotionally abused: Not on file     Physically abused: Not on file     Forced sexual activity: Not on file   Other Topics Concern   • Not on file   Social History Narrative   • Not on file     Current Medications    ASPIRIN 81 MG EC TABLET    Take 1 tablet by mouth daily.    COENZYME Q10 (CO Q 10 PO)        CYANOCOBALAMIN (VITAMIN B 12 PO)    Take by mouth daily.    EZETIMIBE (ZETIA) 10 MG TABLET    Take 1 tablet by mouth daily.    ICOSAPENT ETHYL 1 G CAP    Take 2 g by mouth daily.    MULTIPLE VITAMINS-MINERALS (CENTRUM SILVER 50+MEN PO)        SELENIUIM (SELSUN) 2.5 % LOTION     APPLY TO WHOLE NECK AND TRUNK ,ARMS ONCE DAILY FOR 1 WEEK     ALLERGIES:  No Known Allergies        PHYSICAL EXAM   Visit Vitals  /78 (BP Location: LUE - Left upper extremity, Patient Position: Sitting, Cuff Size: Small Adult)   Pulse (!) 57   Ht 5' 9\" (1.753 m)   Wt 90 kg (198 lb 6.6 oz)   BMI 29.30 kg/m²     Physical Exam:    HEENT: PERRL, EOMI. Normocephalic.  Neck:  There is no jugular venous distention.  No bruits.  Supple neck.   Oral mucosa : Pink and moist.    Endocrine: There is no goiter.  CVS: Nonpalpable PMI.  Regular rate and rhythm.  Normal first and second heart tones.  No murmurs, rubs or gallops.  Lung fields: Clear to auscultation bilaterally.  Abdomen: Soft. Nontender, nondistended.    Lower extremity: No cyanosis, clubbing or edema.   Peripheral vascular: Both lower extremities are warm and well perfused.    Venous system: Calves are benign.  Homans sign is negative.  Neuro: Awake and alert.  Nonfocal examination.  Psych: Appropriate mood and affect  Integumentary: Warm and Dry      Procedures:  No results found for this or any previous visit.    Lexiscan cardiolite stress test 12/2/2020  Summary:     1. Myocardial perfusion imaging: There is no transient ischemic dilation     of the left ventricle during stress. Myocardial perfusion study is     normal.  2. Gated SPECT: The calculated left ventricular ejection fraction is 56%.  3. Stress ECG conclusions: The stress ECG is normal.   4. Baseline ECG: Normal sinus rhythm.  Impressions:   Normal study after pharmacologic stress.    TTE 7/24/2020  SUMMARY:   1. Left ventricle: The cavity size is normal. Wall thickness is mildly increased. Systolic function is normal. The estimated ejection fraction is 55-60%, by biplane method of disks. Wall motion is normal; there are no regional wall motion abnormalities. Doppler parameters are consistent with abnormal left ventricular relaxation (grade 1 diastolic dysfunction).  2. Mitral valve: No  regurgitation.  3. Tricuspid valve: Trivial regurgitation.  4. Right ventricle: Systolic pressure is within the normal range. The estimated peak pressure is 31mm Hg.    Carotid US 6/9/2020   IMPRESSION:   No hemodynamically significant stenoses involving the bilateral common and internal carotid arterial system.    Holter monitor 5/2020  IMPRESSION: 48 hour Holter monitor demonstrating sinus rhythm with occasional, brief, nonsustained atrial runs. No sustained atrial or ventricular arrhythmias were detected. Clinical correlation is recommended.       Labs:  Sodium (mmol/L)   Date Value   07/22/2020 143   05/16/2020 139     Potassium (mmol/L)   Date Value   07/22/2020 4.4   05/16/2020 4.4     Chloride (mmol/L)   Date Value   07/22/2020 111 (H)   05/16/2020 108 (H)     Carbon Dioxide (mmol/L)   Date Value   07/22/2020 25   05/16/2020 24     BUN (mg/dL)   Date Value   08/13/2020 12   07/22/2020 13     Creatinine (mg/dL)   Date Value   08/13/2020 0.94   07/22/2020 0.99     Glucose (mg/dL)   Date Value   07/22/2020 138 (H)   05/16/2020 156 (H)       No results found for: HGBA1C    CHOLESTEROL (mg/dL)   Date Value   07/30/2020 176   07/22/2020 194     HDL (mg/dL)   Date Value   07/30/2020 33 (L)   07/22/2020 23 (L)     TRIGLYCERIDE (mg/dL)   Date Value   07/30/2020 166 (H)   07/22/2020 220 (H)     CALCULATED LDL (mg/dL)   Date Value   07/30/2020 110   07/22/2020 127       TSH (mcUnits/mL)   Date Value   06/06/2020 0.759       No results found for: INR    WBC (K/mcL)   Date Value   07/30/2020 6.7     RBC (mil/mcL)   Date Value   07/30/2020 5.07     HCT (%)   Date Value   07/30/2020 45.3     HGB (g/dL)   Date Value   07/30/2020 14.2     PLT (K/mcL)   Date Value   07/30/2020 171     CARDIAC CATH:  Xience stents to MID RCA (2.25X15) AND MID LCX (2.5X18)by Dr. De León on 9/9/2013    IMPRESSION/PLAN:    H/O Chest pain:atypical  -Patient complains of intermittent chest pain  -Lexiscan cardiolite stress test from 12/2/2020  showed there is no evidence of ischemia.     Elevated LFT's  -8/25/2020: , AST 71  -Continue Zetia    CAD with prior drug-eluting stent to left circumflex and mid RCA  -Continue baby aspirin daily continue beta-blocker  -Off statin presently because of elevated LFT    Tobacco dependence:  -Smoking cessation was also extensively discussed.  Poorly motivated to quit.    Hypertension  -Blood pressure in office today 134/78 mmHg  -Continue labetalol   -Goal blood pressure of <140/90 discussed with patient.     Hyperlipidemia  -Suboptimally controlled..  -Zetia 10 mg daily.  -Off statin d/t elevated LFT  - Awaiting work up/ clearance by PCP/ GI to restart statin.If not, will have to consider PCSK-9 inhibitors if LDL remains >70  -He says Vascepa was not approved by his insurance although it is my understanding that a prior authorization was completed. I have asked him to recheck with his pharmacy  -Dietary lifestyle modifications discussed.  -Lipid panel from 7/30/2020: Cholesterol 176, , HDL 33, and Trig 166. Patient notes that he did not fast for this test   -Goal LDL of less than 70 discussed.  -LFTs ordered today  -Asked him to get lipids tested-was ordered at previous visit    Pulsatile tinnitus versus palpitations  -Revd Holter and carotid Doppler  -Holter without significant arrhythmia  -TSH 6/6/2020: 0.759  -TTE from 7/24/2020 ejection fraction 55-60%,  Trivial regurgitation of the tricuspid valve The estimated peak pressure is 31mm Hg.  -Carotid US from 6/9/2020 showed No hemodynamically significant stenoses involving the bilateral common and internal carotid arterial system.  -Recommended patient to follow up with an ENT specialist    Non sust. atach:  On Labetalol    Jeanette Pinedo MD, FACC  Scribe Attestation: Entered by Gay Boston acting as scribe for Dr. Jeanette Pinedo MD, FACC.     Provider Attestation: The documentation recorded by the scribe accurately reflects the service I  personally performed and the decisions made by me, Jeanette Pinedo MD, FACC.

## 2021-02-15 ENCOUNTER — RX RENEWAL (OUTPATIENT)
Age: 66
End: 2021-02-15

## 2021-03-08 ENCOUNTER — NON-APPOINTMENT (OUTPATIENT)
Age: 66
End: 2021-03-08

## 2021-03-08 PROBLEM — G47.00 INSOMNIA WITH SLEEP APNEA: Status: ACTIVE | Noted: 2019-02-06

## 2021-03-09 ENCOUNTER — APPOINTMENT (OUTPATIENT)
Dept: BARIATRICS/WEIGHT MGMT | Facility: CLINIC | Age: 66
End: 2021-03-09
Payer: MEDICARE

## 2021-03-09 VITALS
OXYGEN SATURATION: 97 % | BODY MASS INDEX: 51.95 KG/M2 | HEART RATE: 89 BPM | HEIGHT: 61 IN | WEIGHT: 275.13 LBS | DIASTOLIC BLOOD PRESSURE: 80 MMHG | SYSTOLIC BLOOD PRESSURE: 140 MMHG

## 2021-03-09 DIAGNOSIS — G47.30 INSOMNIA, UNSPECIFIED: ICD-10-CM

## 2021-03-09 DIAGNOSIS — G47.00 INSOMNIA, UNSPECIFIED: ICD-10-CM

## 2021-03-09 PROCEDURE — 99072 ADDL SUPL MATRL&STAF TM PHE: CPT

## 2021-03-09 PROCEDURE — 99214 OFFICE O/P EST MOD 30 MIN: CPT

## 2021-03-09 NOTE — HISTORY OF PRESENT ILLNESS
[FreeTextEntry1] : 66F PMH obesity (s/p lap-band placement and removal March 2017), HTN, DM2, HLD, nephrolithiasis and overactive bladder presents for weight management f/u.\par \par Weight today: 275 lbs 2 oz, BMI 51.98\par Weight at last visit (10/27/20): 262 lbs 4 oz, BMI 49.55\par  \par Medication: Phentermine 30, Topiramate 50, Ozempic .5. Noting no side effects. Recently discontinued her amlodipine\par \par Diet: She has not been eating organized meals or paying attention to food choices as she has been busy taking care of her mother. Eating twice per day (noon and 8 pm) and snacks. Stopped food log. Her mother is now in better health and she thinks she can start focusing on herself again.\par \par Exercise: Going to physical therapy. Has knee pain that has limited additional walking.\par \par Sleep: Remains poor, usually 4-7 am.

## 2021-03-09 NOTE — ASSESSMENT
[FreeTextEntry1] : 66F PMH obesity (s/p lap-band placement and removal March 2017), HTN, DM2, HLD, nephrolithiasis and overactive bladder presents for weight management f/u.\par \par # Obesity (s/p lap-band placement and removal March 2017) c/b DM2, HTN, HLD, nephrolithiasis: Weight today 275 lbs 2 oz, BMI 51.9. Taking Phentermine 30, Topiramate 50, Ozempic .5 and tolerating all well.\par - Food log\par - C/w physical therapy\par - Incorporate walking and further exercise as tolerated and per PT.\par - Goal for 2-3 planned healthy meals per day.\par - C/w sleep hygiene, may benefit from melatonin.\par - C/w current medication regimen\par \par \par \par Bariatric Surgery History: Lap band placement and removal (3/2017)\par \par Obesity Comorbidities: DM2, HTN, HLD\par \par Obesity Comorbidity resolution or improvement: HTN improved, diabetic control improved.\par \par Anti-Obesity Medications: Ozempic, Phentermine 30 mg, Topiramate 50 mg\par \par Obesity Medication Side Effects: None (hx renal stones, not specifically due to Topiramate).

## 2021-03-19 ENCOUNTER — NON-APPOINTMENT (OUTPATIENT)
Age: 66
End: 2021-03-19

## 2021-05-03 ENCOUNTER — NON-APPOINTMENT (OUTPATIENT)
Age: 66
End: 2021-05-03

## 2021-05-10 ENCOUNTER — APPOINTMENT (OUTPATIENT)
Dept: UROLOGY | Facility: CLINIC | Age: 66
End: 2021-05-10

## 2021-05-14 ENCOUNTER — APPOINTMENT (OUTPATIENT)
Dept: UROLOGY | Facility: CLINIC | Age: 66
End: 2021-05-14
Payer: MEDICARE

## 2021-05-14 ENCOUNTER — OUTPATIENT (OUTPATIENT)
Dept: OUTPATIENT SERVICES | Facility: HOSPITAL | Age: 66
LOS: 1 days | End: 2021-05-14
Payer: COMMERCIAL

## 2021-05-14 DIAGNOSIS — Z96.641 PRESENCE OF RIGHT ARTIFICIAL HIP JOINT: Chronic | ICD-10-CM

## 2021-05-14 DIAGNOSIS — Z90.710 ACQUIRED ABSENCE OF BOTH CERVIX AND UTERUS: Chronic | ICD-10-CM

## 2021-05-14 DIAGNOSIS — N32.81 OVERACTIVE BLADDER: Chronic | ICD-10-CM

## 2021-05-14 DIAGNOSIS — Z98.42 CATARACT EXTRACTION STATUS, LEFT EYE: Chronic | ICD-10-CM

## 2021-05-14 DIAGNOSIS — Z98.89 OTHER SPECIFIED POSTPROCEDURAL STATES: Chronic | ICD-10-CM

## 2021-05-14 DIAGNOSIS — Z87.442 PERSONAL HISTORY OF URINARY CALCULI: Chronic | ICD-10-CM

## 2021-05-14 PROCEDURE — 99072 ADDL SUPL MATRL&STAF TM PHE: CPT

## 2021-05-14 PROCEDURE — 99213 OFFICE O/P EST LOW 20 MIN: CPT

## 2021-05-14 PROCEDURE — 95972 ALYS CPLX SP/PN NPGT W/PRGRM: CPT

## 2021-05-14 RX ORDER — SOLIFENACIN SUCCINATE 10 MG/1
10 TABLET ORAL DAILY
Qty: 90 | Refills: 0 | Status: COMPLETED | COMMUNITY
Start: 2019-09-04 | End: 2021-05-14

## 2021-05-14 RX ORDER — ACETAMINOPHEN 500 MG/1
TABLET ORAL
Refills: 0 | Status: COMPLETED | COMMUNITY
End: 2021-05-14

## 2021-05-14 RX ORDER — SOLIFENACIN SUCCINATE 5 MG/1
5 TABLET ORAL DAILY
Qty: 90 | Refills: 0 | Status: COMPLETED | COMMUNITY
Start: 2019-09-04 | End: 2021-05-14

## 2021-05-14 RX ORDER — MELATONIN 3 MG
3 CAPSULE ORAL
Qty: 60 | Refills: 2 | Status: COMPLETED | COMMUNITY
Start: 2020-07-21 | End: 2021-05-14

## 2021-05-14 RX ORDER — CHOLESTYRAMINE 4 G/9G
4 POWDER, FOR SUSPENSION ORAL
Refills: 0 | Status: COMPLETED | COMMUNITY
End: 2021-05-14

## 2021-05-14 RX ORDER — OXYBUTYNIN CHLORIDE 5 MG/1
5 TABLET ORAL
Qty: 60 | Refills: 5 | Status: COMPLETED | COMMUNITY
Start: 2019-09-06 | End: 2021-05-14

## 2021-05-14 NOTE — PHYSICAL EXAM
[General Appearance - Well Developed] : well developed [General Appearance - Well Nourished] : well nourished [Normal Appearance] : normal appearance [Well Groomed] : well groomed [General Appearance - In No Acute Distress] : no acute distress [FreeTextEntry1] : morbidly obese  [Abdomen Soft] : soft [Abdomen Tenderness] : non-tender [Costovertebral Angle Tenderness] : no ~M costovertebral angle tenderness [Edema] : no peripheral edema [] : no respiratory distress [Respiration, Rhythm And Depth] : normal respiratory rhythm and effort [Exaggerated Use Of Accessory Muscles For Inspiration] : no accessory muscle use [Oriented To Time, Place, And Person] : oriented to person, place, and time [Affect] : the affect was normal [Mood] : the mood was normal [Not Anxious] : not anxious [Normal Station and Gait] : the gait and station were normal for the patient's age

## 2021-05-14 NOTE — ASSESSMENT
[FreeTextEntry1] : \par 65 yo woman with OAB and urge incontinence SP SNS April 2016 and ? revision in 2018, here today for SNS programming and check .PSH lap band placement and removal in March 2017. Voids with some urgency Q  2 h hours during the day and nocturia x 1 with use of Depends lined with pad X 1 per day .Fluids 24 oz X 2-3 while at home and not adequate fruits or vegetables intake. She has been very busy with caring for her ailing mother and daughter and has not been eating or drinking well .Occasional constipation managed with senna tabs. Meds reconciled.\par \par - Right sided SNS was checked and reprogrammed with Jose from [employer] . \par patient is active with program 2 (3- 2- 0+) at 1.7 Amp. Battery life 13-23 months and no impedance noted. \par Life style behavior modifications- fluids management - increase fluids - water intake from \par 40  to 64  oz .Fiber intake and dietary modifications. \par Stop oxybutynin 5 mg .\par Trial of Myrbetriq ER 25 mg PO daily -side effects reviewed. \par RTO in 4 months. \par Follow up with Dr Watts for nephrolithiasis .

## 2021-05-14 NOTE — REVIEW OF SYSTEMS
[Constipation] : constipation [see HPI] : see HPI [Incontinence] : incontinence [Negative] : Heme/Lymph

## 2021-05-14 NOTE — HISTORY OF PRESENT ILLNESS
[FreeTextEntry1] : 67 yo woman with OAB and urge incontinence SP SNS April 2016 and ? revision in 2018, here today for SNS programming and check .PSH lap band placement and removal in March 2017. Voids with some urgency Q  2 h hours during the day and nocturia x 1 with use of Depends lined with pad X 1 per day .Fluids 24 oz X 2-3 while at home and not adequate fruits or vegetables intake. She has been very busy with caring for her ailing mother and daughter and has not been eating or drinking well .Occasional constipation managed with senna tabs. Meds reconciled.\par \par - Right sided SNS was checked and reprogrammed with Jose from [employer] . \par patient is active with program 2 (3- 2- 0+) at 1.7 Amp. Battery life 13-23 months and no impedance noted. \par Life style behavior modifications- fluids management - increase fluids - water intake from \par 40  to 64  oz .Fiber intake and dietary modifications. \par Stop oxybutynin 5 mg .\par Trial of Myrbetriq ER 25 mg PO daily -side effects reviewed. \par RTO in 4 months. \par Follow up with Dr Watts for nephrolithiasis .

## 2021-05-20 RX ORDER — MIRABEGRON 25 MG/1
25 TABLET, FILM COATED, EXTENDED RELEASE ORAL
Qty: 30 | Refills: 3 | Status: COMPLETED | COMMUNITY
Start: 2021-05-14 | End: 2021-05-20

## 2021-06-01 ENCOUNTER — OUTPATIENT (OUTPATIENT)
Dept: OUTPATIENT SERVICES | Facility: HOSPITAL | Age: 66
LOS: 1 days | End: 2021-06-01
Payer: COMMERCIAL

## 2021-06-01 ENCOUNTER — APPOINTMENT (OUTPATIENT)
Dept: UROLOGY | Facility: CLINIC | Age: 66
End: 2021-06-01
Payer: MEDICARE

## 2021-06-01 DIAGNOSIS — Z87.442 PERSONAL HISTORY OF URINARY CALCULI: Chronic | ICD-10-CM

## 2021-06-01 DIAGNOSIS — Z90.710 ACQUIRED ABSENCE OF BOTH CERVIX AND UTERUS: Chronic | ICD-10-CM

## 2021-06-01 DIAGNOSIS — R35.0 FREQUENCY OF MICTURITION: ICD-10-CM

## 2021-06-01 DIAGNOSIS — Z87.442 PERSONAL HISTORY OF URINARY CALCULI: ICD-10-CM

## 2021-06-01 DIAGNOSIS — N32.81 OVERACTIVE BLADDER: Chronic | ICD-10-CM

## 2021-06-01 DIAGNOSIS — Z98.89 OTHER SPECIFIED POSTPROCEDURAL STATES: Chronic | ICD-10-CM

## 2021-06-01 DIAGNOSIS — Z96.641 PRESENCE OF RIGHT ARTIFICIAL HIP JOINT: Chronic | ICD-10-CM

## 2021-06-01 DIAGNOSIS — Z98.42 CATARACT EXTRACTION STATUS, LEFT EYE: Chronic | ICD-10-CM

## 2021-06-01 PROCEDURE — 76775 US EXAM ABDO BACK WALL LIM: CPT

## 2021-06-01 PROCEDURE — 99213 OFFICE O/P EST LOW 20 MIN: CPT

## 2021-06-01 PROCEDURE — 76775 US EXAM ABDO BACK WALL LIM: CPT | Mod: 26

## 2021-06-01 PROCEDURE — 51728 CYSTOMETROGRAM W/VP: CPT

## 2021-06-01 PROCEDURE — 51797 INTRAABDOMINAL PRESSURE TEST: CPT

## 2021-06-01 PROCEDURE — 51784 ANAL/URINARY MUSCLE STUDY: CPT

## 2021-06-01 PROCEDURE — 51741 ELECTRO-UROFLOWMETRY FIRST: CPT

## 2021-06-01 NOTE — ASSESSMENT
[FreeTextEntry1] : The patient is a 66 year old female presenting today for a renal US.\par \par The renal US from 6/1/21 demonstrated:\par The kidneys were evaluated in both axial and transverse planes, utilizing Supersonic Aixplorer ultrasound and the 4.0 mHz solid state transducer. Views were taken of the upper pole, middle section of the renal pelvis and the lower pole from medial to lateral and the echogenicity compared to surrounding structures.\par \par Right kidney: 10.0 x 5.0 x 5.7 cm \par \par Cortical Thickness: up 1.2 cm lp 1.3 cm \par \par Left kidney: 10.2 x 5.0 x 4.9 cm\par \par Cortical Thickness: up 1.5 cm lp 1.6 cm \par \par Echogenicity: Normal\par \par Bladder: Not visualized\par \par Findings: There is a 3.2 mm echogenic focus with posterior shadow in the lower pole right kidney likely non obstructing stone. Previously visualized cluster of stones in the left lower pole not appreciated on this exam. There is a sub centimeter simple cyst in the left lower pole. Both kidneys are normal in size and echogenicity without hydronephrosis or solid masses visualized\par \par The Litholink from 5/20/21 demonstrated:\par A low urine output resulting in SS CaOx and SS CaP. \par Elevated Ca\par \par I recommend that she increases hydration to prevent the formation of stones.\par I also recommend that she loses weight. \par \par She will complete a Litholink in 5 months.\par She will have a renal US done in 6 months.\par \par The patient will return to the office in 6 months.  I recommend that she continues to follow up with the NP she saw for her overactive bladder.\par \par I spent 25 minutes with the patient. \par

## 2021-06-01 NOTE — HISTORY OF PRESENT ILLNESS
[FreeTextEntry1] : The patient is a 66 year old female presenting today for a renal US.\par She noted that she has some leakage and has an interstim machine.\par \par The renal US from 6/1/21 demonstrated:\par The kidneys were evaluated in both axial and transverse planes, utilizing Supersonic Aixplorer ultrasound and the 4.0 mHz solid state transducer. Views were taken of the upper pole, middle section of the renal pelvis and the lower pole from medial to lateral and the echogenicity compared to surrounding structures.\par \par Right kidney: 10.0 x 5.0 x 5.7 cm \par \par Cortical Thickness: up 1.2 cm lp 1.3 cm \par \par Left kidney: 10.2 x 5.0 x 4.9 cm\par \par Cortical Thickness: up 1.5 cm lp 1.6 cm \par \par Echogenicity: Normal\par \par Bladder: Not visualized\par \par Findings: There is a 3.2 mm echogenic focus with posterior shadow in the lower pole right kidney likely non obstructing stone. Previously visualized cluster of stones in the left lower pole not appreciated on this exam. There is a sub centimeter simple cyst in the left lower pole. Both kidneys are normal in size and echogenicity without hydronephrosis or solid masses visualized\par \par The Litholink from 5/20/21 demonstrated:\par A low urine output resulting in SS CaOx and SS CaP. \par Elevated Ca\par \par I recommend that she increases hydration to prevent the formation of stones.\par I also recommend that she loses weight. \par \par She will complete a Litholink in 5 months.\par She will have a renal US done in 6 months.\par \par The patient will return to the office in 6 months.  I recommend that she continues to follow up with the NP she saw for her overactive bladder.

## 2021-06-01 NOTE — PHYSICAL EXAM
[General Appearance - Well Developed] : well developed [Normal Appearance] : normal appearance [Well Groomed] : well groomed [General Appearance - In No Acute Distress] : no acute distress [Abdomen Soft] : soft [Abdomen Tenderness] : non-tender [Edema] : no peripheral edema [] : no respiratory distress [Respiration, Rhythm And Depth] : normal respiratory rhythm and effort [Exaggerated Use Of Accessory Muscles For Inspiration] : no accessory muscle use [Oriented To Time, Place, And Person] : oriented to person, place, and time [Affect] : the affect was normal [Mood] : the mood was normal [Not Anxious] : not anxious [Normal Station and Gait] : the gait and station were normal for the patient's age [No Focal Deficits] : no focal deficits [No Palpable Adenopathy] : no palpable adenopathy

## 2021-06-29 ENCOUNTER — APPOINTMENT (OUTPATIENT)
Dept: BARIATRICS/WEIGHT MGMT | Facility: CLINIC | Age: 66
End: 2021-06-29
Payer: MEDICARE

## 2021-06-29 VITALS
SYSTOLIC BLOOD PRESSURE: 142 MMHG | HEIGHT: 62 IN | WEIGHT: 279.13 LBS | DIASTOLIC BLOOD PRESSURE: 80 MMHG | HEART RATE: 102 BPM | OXYGEN SATURATION: 97 % | BODY MASS INDEX: 51.37 KG/M2

## 2021-06-29 PROCEDURE — 99213 OFFICE O/P EST LOW 20 MIN: CPT

## 2021-06-29 NOTE — ASSESSMENT
[FreeTextEntry1] : 66F PMH obesity (s/p lap-band placement and removal March 2017), HTN, DM2, HLD, nephrolithiasis and overactive bladder presents for weight management f/u.\par \par # Obesity (s/p lap-band placement and removal March 2017) c/b DM2, HTN, HLD, nephrolithiasis: Weight today 279 lbs 2 oz, BMI 51.05. She gained 4 lbs since last visit. Remains on Phentermine, Topiramate, Ozempic. No noted side effects, renal stones being monitored by urologist. Numerous stressors currently with unplanned meals and minimal physical activity.\par - Food log\par - Discussed eating plans to manage with her numerous responsibilities and stressors. These would focus around having pre-planned/prepared meals available for some of her busy days, and otherwise utilizing these periods (when only "junk food" is around) as fasting periods, to avoid food unless she feels light-headed. Patient encouraged to drink plenty of water during these periods.\par - Continue to hydrate\par - Increase physical activity as tolerated, follow-up with ortho. Would benefit by continuing PT. Goal 1000k steps per day if she is able without significant pain or exacerbation.\par - C/w Phentermine 30 mg, topiramate 50 mg, Ozempic. May need to reconsider topiramate depending on evolution of renal stones, but patient was further advised in agreement with her urologist that she needs to maintain hydration. Will increase Ozempic if she can get coverage.\par - Sleep hygiene\par - F/u 4-6 weeks\par \par Note: Ozempic copay would be ~$200, however will discuss with patient looking into coupons.\par \par \par \par \par Bariatric Surgery History: Lap band placement and removal (3/2017)\par \par Obesity Comorbidities: DM2, HTN, HLD\par \par Obesity Comorbidity resolution or improvement: HTN improved, diabetic control improved.\par \par Anti-Obesity Medications: Ozempic, Phentermine 30 mg, Topiramate 50 mg\par \par Obesity Medication Side Effects: None (hx renal stones, not specifically due to Topiramate).
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
## 2021-07-20 ENCOUNTER — EMERGENCY (EMERGENCY)
Facility: HOSPITAL | Age: 66
LOS: 1 days | Discharge: HOME | End: 2021-07-20
Attending: EMERGENCY MEDICINE | Admitting: EMERGENCY MEDICINE
Payer: COMMERCIAL

## 2021-07-20 VITALS
OXYGEN SATURATION: 96 % | RESPIRATION RATE: 16 BRPM | TEMPERATURE: 98 F | DIASTOLIC BLOOD PRESSURE: 82 MMHG | SYSTOLIC BLOOD PRESSURE: 117 MMHG | HEART RATE: 83 BPM

## 2021-07-20 VITALS
HEIGHT: 62 IN | SYSTOLIC BLOOD PRESSURE: 138 MMHG | DIASTOLIC BLOOD PRESSURE: 98 MMHG | RESPIRATION RATE: 15 BRPM | HEART RATE: 90 BPM | OXYGEN SATURATION: 98 % | WEIGHT: 259.93 LBS | TEMPERATURE: 98 F

## 2021-07-20 DIAGNOSIS — N32.81 OVERACTIVE BLADDER: Chronic | ICD-10-CM

## 2021-07-20 DIAGNOSIS — Z98.89 OTHER SPECIFIED POSTPROCEDURAL STATES: Chronic | ICD-10-CM

## 2021-07-20 DIAGNOSIS — Z96.641 PRESENCE OF RIGHT ARTIFICIAL HIP JOINT: Chronic | ICD-10-CM

## 2021-07-20 DIAGNOSIS — Z87.442 PERSONAL HISTORY OF URINARY CALCULI: Chronic | ICD-10-CM

## 2021-07-20 DIAGNOSIS — Z98.42 CATARACT EXTRACTION STATUS, LEFT EYE: Chronic | ICD-10-CM

## 2021-07-20 DIAGNOSIS — Z90.710 ACQUIRED ABSENCE OF BOTH CERVIX AND UTERUS: Chronic | ICD-10-CM

## 2021-07-20 LAB
ALBUMIN SERPL ELPH-MCNC: 3.3 G/DL — SIGNIFICANT CHANGE UP (ref 3.3–5)
ALP SERPL-CCNC: 89 U/L — SIGNIFICANT CHANGE UP (ref 30–120)
ALT FLD-CCNC: 28 U/L DA — SIGNIFICANT CHANGE UP (ref 10–60)
ANION GAP SERPL CALC-SCNC: 6 MMOL/L — SIGNIFICANT CHANGE UP (ref 5–17)
APPEARANCE UR: CLEAR — SIGNIFICANT CHANGE UP
AST SERPL-CCNC: 40 U/L — SIGNIFICANT CHANGE UP (ref 10–40)
BASOPHILS # BLD AUTO: 0.04 K/UL — SIGNIFICANT CHANGE UP (ref 0–0.2)
BASOPHILS NFR BLD AUTO: 0.3 % — SIGNIFICANT CHANGE UP (ref 0–2)
BILIRUB SERPL-MCNC: 0.6 MG/DL — SIGNIFICANT CHANGE UP (ref 0.2–1.2)
BILIRUB UR-MCNC: NEGATIVE — SIGNIFICANT CHANGE UP
BUN SERPL-MCNC: 10 MG/DL — SIGNIFICANT CHANGE UP (ref 7–23)
CALCIUM SERPL-MCNC: 9.6 MG/DL — SIGNIFICANT CHANGE UP (ref 8.4–10.5)
CHLORIDE SERPL-SCNC: 102 MMOL/L — SIGNIFICANT CHANGE UP (ref 96–108)
CO2 SERPL-SCNC: 26 MMOL/L — SIGNIFICANT CHANGE UP (ref 22–31)
COLOR SPEC: YELLOW — SIGNIFICANT CHANGE UP
CREAT SERPL-MCNC: 0.9 MG/DL — SIGNIFICANT CHANGE UP (ref 0.5–1.3)
DIFF PNL FLD: NEGATIVE — SIGNIFICANT CHANGE UP
EOSINOPHIL # BLD AUTO: 0.14 K/UL — SIGNIFICANT CHANGE UP (ref 0–0.5)
EOSINOPHIL NFR BLD AUTO: 1.1 % — SIGNIFICANT CHANGE UP (ref 0–6)
GLUCOSE SERPL-MCNC: 98 MG/DL — SIGNIFICANT CHANGE UP (ref 70–99)
GLUCOSE UR QL: NEGATIVE MG/DL — SIGNIFICANT CHANGE UP
HCT VFR BLD CALC: 44.5 % — SIGNIFICANT CHANGE UP (ref 34.5–45)
HGB BLD-MCNC: 14.5 G/DL — SIGNIFICANT CHANGE UP (ref 11.5–15.5)
IMM GRANULOCYTES NFR BLD AUTO: 0.2 % — SIGNIFICANT CHANGE UP (ref 0–1.5)
KETONES UR-MCNC: NEGATIVE — SIGNIFICANT CHANGE UP
LEUKOCYTE ESTERASE UR-ACNC: ABNORMAL
LIDOCAIN IGE QN: 188 U/L — SIGNIFICANT CHANGE UP (ref 73–393)
LYMPHOCYTES # BLD AUTO: 2.78 K/UL — SIGNIFICANT CHANGE UP (ref 1–3.3)
LYMPHOCYTES # BLD AUTO: 22.4 % — SIGNIFICANT CHANGE UP (ref 13–44)
MCHC RBC-ENTMCNC: 28.8 PG — SIGNIFICANT CHANGE UP (ref 27–34)
MCHC RBC-ENTMCNC: 32.6 GM/DL — SIGNIFICANT CHANGE UP (ref 32–36)
MCV RBC AUTO: 88.3 FL — SIGNIFICANT CHANGE UP (ref 80–100)
MONOCYTES # BLD AUTO: 0.96 K/UL — HIGH (ref 0–0.9)
MONOCYTES NFR BLD AUTO: 7.7 % — SIGNIFICANT CHANGE UP (ref 2–14)
NEUTROPHILS # BLD AUTO: 8.46 K/UL — HIGH (ref 1.8–7.4)
NEUTROPHILS NFR BLD AUTO: 68.3 % — SIGNIFICANT CHANGE UP (ref 43–77)
NITRITE UR-MCNC: NEGATIVE — SIGNIFICANT CHANGE UP
NRBC # BLD: 0 /100 WBCS — SIGNIFICANT CHANGE UP (ref 0–0)
PH UR: 6.5 — SIGNIFICANT CHANGE UP (ref 5–8)
PLATELET # BLD AUTO: 250 K/UL — SIGNIFICANT CHANGE UP (ref 150–400)
POTASSIUM SERPL-MCNC: 5.3 MMOL/L — SIGNIFICANT CHANGE UP (ref 3.5–5.3)
POTASSIUM SERPL-SCNC: 5.3 MMOL/L — SIGNIFICANT CHANGE UP (ref 3.5–5.3)
PROT SERPL-MCNC: 8.1 G/DL — SIGNIFICANT CHANGE UP (ref 6–8.3)
PROT UR-MCNC: NEGATIVE MG/DL — SIGNIFICANT CHANGE UP
RBC # BLD: 5.04 M/UL — SIGNIFICANT CHANGE UP (ref 3.8–5.2)
RBC # FLD: 13.7 % — SIGNIFICANT CHANGE UP (ref 10.3–14.5)
SODIUM SERPL-SCNC: 134 MMOL/L — LOW (ref 135–145)
SP GR SPEC: 1.01 — SIGNIFICANT CHANGE UP (ref 1.01–1.02)
UROBILINOGEN FLD QL: NEGATIVE MG/DL — SIGNIFICANT CHANGE UP
WBC # BLD: 12.41 K/UL — HIGH (ref 3.8–10.5)
WBC # FLD AUTO: 12.41 K/UL — HIGH (ref 3.8–10.5)

## 2021-07-20 PROCEDURE — 80053 COMPREHEN METABOLIC PANEL: CPT

## 2021-07-20 PROCEDURE — 81001 URINALYSIS AUTO W/SCOPE: CPT

## 2021-07-20 PROCEDURE — 96375 TX/PRO/DX INJ NEW DRUG ADDON: CPT

## 2021-07-20 PROCEDURE — 36415 COLL VENOUS BLD VENIPUNCTURE: CPT

## 2021-07-20 PROCEDURE — 85025 COMPLETE CBC W/AUTO DIFF WBC: CPT

## 2021-07-20 PROCEDURE — 99284 EMERGENCY DEPT VISIT MOD MDM: CPT

## 2021-07-20 PROCEDURE — 96374 THER/PROPH/DIAG INJ IV PUSH: CPT

## 2021-07-20 PROCEDURE — 99284 EMERGENCY DEPT VISIT MOD MDM: CPT | Mod: 25

## 2021-07-20 PROCEDURE — 83690 ASSAY OF LIPASE: CPT

## 2021-07-20 RX ORDER — FAMOTIDINE 10 MG/ML
20 INJECTION INTRAVENOUS ONCE
Refills: 0 | Status: COMPLETED | OUTPATIENT
Start: 2021-07-20 | End: 2021-07-20

## 2021-07-20 RX ORDER — ONDANSETRON 8 MG/1
4 TABLET, FILM COATED ORAL ONCE
Refills: 0 | Status: COMPLETED | OUTPATIENT
Start: 2021-07-20 | End: 2021-07-20

## 2021-07-20 RX ORDER — SODIUM CHLORIDE 9 MG/ML
1000 INJECTION INTRAMUSCULAR; INTRAVENOUS; SUBCUTANEOUS ONCE
Refills: 0 | Status: COMPLETED | OUTPATIENT
Start: 2021-07-20 | End: 2021-07-20

## 2021-07-20 RX ADMIN — SODIUM CHLORIDE 1000 MILLILITER(S): 9 INJECTION INTRAMUSCULAR; INTRAVENOUS; SUBCUTANEOUS at 20:58

## 2021-07-20 RX ADMIN — ONDANSETRON 4 MILLIGRAM(S): 8 TABLET, FILM COATED ORAL at 20:58

## 2021-07-20 RX ADMIN — FAMOTIDINE 20 MILLIGRAM(S): 10 INJECTION INTRAVENOUS at 20:57

## 2021-07-20 NOTE — ED PROVIDER NOTE - OBJECTIVE STATEMENT
65yo female who presents with diarrhea for a month. pt c/o watery diarrhea, no fever, chills, no pain or vomiting, pt has been seen by urgent care, her pmd, her GI doc, has had blood work and stool samples all normal, and pt is still having diarrhea, no recent travel or sick contacs

## 2021-07-20 NOTE — ED PROVIDER NOTE - PATIENT PORTAL LINK FT
You can access the FollowMyHealth Patient Portal offered by [facility] by registering at the following website: [email or web address]. By joining [employer]’s FollowMyHealth portal, you will also be able to view your health information using other applications (apps) compatible with our system.

## 2021-07-20 NOTE — ED PROVIDER NOTE - NSFOLLOWUPINSTRUCTIONS_ED_ALL_ED_FT
Acute Diarrhea    WHAT YOU NEED TO KNOW:    Acute diarrhea starts quickly and lasts a short time, usually 1 to 3 days. It can last up to 2 weeks. You may not be able to control your diarrhea. Acute diarrhea usually stops on its own.     DISCHARGE INSTRUCTIONS:    Return to the emergency department if:   •You feel confused.       •Your heartbeat is faster than usual.       •Your eyes look deeply sunken, or you have no tears when you cry.       •You urinate less than usual, or your urine is dark yellow.       •You have blood or mucus in your bowel movements.      •You have severe abdominal pain.       •You are unable to drink any liquids.       Contact your healthcare provider if:   •Your symptoms do not get better with treatment.       •You have a fever higher than 101.3°F (38.5°C).       •You have trouble eating and drinking because you are vomiting.       •Your diarrhea does not get better in 7 days.       •You have questions or concerns about your condition or care.       Follow up with your healthcare provider as directed: Write down your questions so you remember to ask them during your visits.     Medicines:  •Diarrhea medicine is an over-the-counter medicine that helps slow or stop your diarrhea. Do not take this medicine unless your healthcare provider says it is okay.       •Antibiotics may be given to help treat an infection caused by bacteria.       •Antiparasitics may be given to treat an infection caused by parasites.       •Take your medicine as directed. Contact your healthcare provider if you think your medicine is not helping or if you have side effects. Tell him of her if you are allergic to any medicine. Keep a list of the medicines, vitamins, and herbs you take. Include the amounts, and when and why you take them. Bring the list or the pill bottles to follow-up visits. Carry your medicine list with you in case of an emergency.      Self-care:   •Drink liquids as directed. Liquids will help prevent dehydration caused by diarrhea. Ask your healthcare provider how much liquid to drink each day and which liquids are best for you. You may need to drink an oral rehydration solution (ORS). An ORS has the right amounts of water, salts, and sugar you need to replace body fluids. You can buy an ORS at most grocery stores and pharmacies.       •Eat foods that are easy to digest. Examples include rice, lentils, cereal, bananas, potatoes, and bread. It also includes some fruits (bananas, melon), well-cooked vegetables, and lean meats. Do not eat foods high in fiber, fat, and sugar. Do not drink alcohol until your diarrhea is gone.       Prevent acute diarrhea:   •Wash your hands often. Use soap and water. Wash your hands before you eat or prepare food. Also wash your hands after you use the bathroom. Use an alcohol-based hand gel when soap and water are not available.   Handwashing           •Keep bathroom surfaces clean. This helps prevent the spread of germs that cause acute diarrhea.       •Wash fruits and vegetables well before you eat them. This can help remove germs that cause diarrhea. If possible, remove the skin from fruits and vegetables, or cook them well before you eat them.       •Cook meat and poultry as directed. Meat includes beef and pork. Poultry includes chicken, turkey, and duck.?Cook ground meat to 160°F.       ?Cook ground poultry, whole poultry, or cuts of poultry to at least 165°F. Remove the poultry from heat. Let it stand for 3 minutes before you eat it.       ?Cook whole cuts of meat other than poultry to at least 145°F. Remove the meat from heat. Let it stand for 3 minutes before you eat it.       •Wash dishes that have touched raw meat or poultry with hot water and soap. This includes cutting boards, utensils, dishes, and serving containers.       •Place raw or cooked meat or poultry in the refrigerator as soon as possible. Bacteria can grow in meat or poultry that is left at room temperature too long.       •Do not eat raw or undercooked oysters, clams, or mussels. These foods may be contaminated and cause infection.       •Drink only filtered or treated water when you travel. Do not put ice in your drinks. Drink bottled water whenever possible.

## 2021-07-20 NOTE — ED PROVIDER NOTE - PROGRESS NOTE DETAILS
pt reevaluted, feeling better, no diarrhea since being in the Er, pt to be d/c home follow up with GI, pending stool cx results, return if any symptoms worsen

## 2021-07-20 NOTE — ED ADULT NURSE NOTE - OBJECTIVE STATEMENT
pt walked in c/o diarrhea x 1month, denies N/V , no fever/chills or urinary symptoms. pt reports she had GI work up done and everything was normal. pt states she has watery diarrhea and no episodes today.

## 2021-07-20 NOTE — ED ADULT NURSE REASSESSMENT NOTE - NS ED NURSE REASSESS COMMENT FT1
Tests resulted, pt verbalize improvement of symptoms, VSS, pt reevaluated by MD, D/C home with f/u instructions.

## 2021-07-20 NOTE — ED PROVIDER NOTE - PMH
Diverticulitis    Essential hypertension    GERD (Gastroesophageal Reflux Disease)    Gout    Irritable bowel syndrome, unspecified type    Morbid Obesity  BMI 52  Osteoarthritis    Overactive Bladder    Pancreatitis Chronic  stable since 2008  Type 2 diabetes mellitus

## 2021-07-21 ENCOUNTER — NON-APPOINTMENT (OUTPATIENT)
Age: 66
End: 2021-07-21

## 2021-07-21 ENCOUNTER — EMERGENCY (EMERGENCY)
Facility: HOSPITAL | Age: 66
LOS: 1 days | Discharge: HOME | End: 2021-07-21
Attending: EMERGENCY MEDICINE | Admitting: EMERGENCY MEDICINE
Payer: COMMERCIAL

## 2021-07-21 VITALS
OXYGEN SATURATION: 100 % | TEMPERATURE: 98 F | DIASTOLIC BLOOD PRESSURE: 94 MMHG | SYSTOLIC BLOOD PRESSURE: 153 MMHG | RESPIRATION RATE: 18 BRPM | HEART RATE: 90 BPM

## 2021-07-21 VITALS
RESPIRATION RATE: 18 BRPM | HEIGHT: 62 IN | TEMPERATURE: 98 F | SYSTOLIC BLOOD PRESSURE: 118 MMHG | OXYGEN SATURATION: 99 % | HEART RATE: 96 BPM | WEIGHT: 259.93 LBS | DIASTOLIC BLOOD PRESSURE: 90 MMHG

## 2021-07-21 DIAGNOSIS — Z98.89 OTHER SPECIFIED POSTPROCEDURAL STATES: Chronic | ICD-10-CM

## 2021-07-21 DIAGNOSIS — Z87.442 PERSONAL HISTORY OF URINARY CALCULI: Chronic | ICD-10-CM

## 2021-07-21 DIAGNOSIS — Z98.42 CATARACT EXTRACTION STATUS, LEFT EYE: Chronic | ICD-10-CM

## 2021-07-21 DIAGNOSIS — N32.81 OVERACTIVE BLADDER: Chronic | ICD-10-CM

## 2021-07-21 DIAGNOSIS — Z96.641 PRESENCE OF RIGHT ARTIFICIAL HIP JOINT: Chronic | ICD-10-CM

## 2021-07-21 DIAGNOSIS — Z98.890 OTHER SPECIFIED POSTPROCEDURAL STATES: Chronic | ICD-10-CM

## 2021-07-21 DIAGNOSIS — Z90.710 ACQUIRED ABSENCE OF BOTH CERVIX AND UTERUS: Chronic | ICD-10-CM

## 2021-07-21 LAB
ALBUMIN SERPL ELPH-MCNC: 3.6 G/DL — SIGNIFICANT CHANGE UP (ref 3.3–5)
ALP SERPL-CCNC: 95 U/L — SIGNIFICANT CHANGE UP (ref 30–120)
ALT FLD-CCNC: 28 U/L DA — SIGNIFICANT CHANGE UP (ref 10–60)
ANION GAP SERPL CALC-SCNC: 9 MMOL/L — SIGNIFICANT CHANGE UP (ref 5–17)
APPEARANCE UR: CLEAR — SIGNIFICANT CHANGE UP
AST SERPL-CCNC: 22 U/L — SIGNIFICANT CHANGE UP (ref 10–40)
BASOPHILS # BLD AUTO: 0 K/UL — SIGNIFICANT CHANGE UP (ref 0–0.2)
BASOPHILS NFR BLD AUTO: 0 % — SIGNIFICANT CHANGE UP (ref 0–2)
BILIRUB SERPL-MCNC: 0.6 MG/DL — SIGNIFICANT CHANGE UP (ref 0.2–1.2)
BILIRUB UR-MCNC: NEGATIVE — SIGNIFICANT CHANGE UP
BUN SERPL-MCNC: 9 MG/DL — SIGNIFICANT CHANGE UP (ref 7–23)
CALCIUM SERPL-MCNC: 9.6 MG/DL — SIGNIFICANT CHANGE UP (ref 8.4–10.5)
CHLORIDE SERPL-SCNC: 101 MMOL/L — SIGNIFICANT CHANGE UP (ref 96–108)
CO2 SERPL-SCNC: 27 MMOL/L — SIGNIFICANT CHANGE UP (ref 22–31)
COLOR SPEC: YELLOW — SIGNIFICANT CHANGE UP
CREAT SERPL-MCNC: 0.94 MG/DL — SIGNIFICANT CHANGE UP (ref 0.5–1.3)
DIFF PNL FLD: NEGATIVE — SIGNIFICANT CHANGE UP
EOSINOPHIL # BLD AUTO: 0.14 K/UL — SIGNIFICANT CHANGE UP (ref 0–0.5)
EOSINOPHIL NFR BLD AUTO: 1 % — SIGNIFICANT CHANGE UP (ref 0–6)
GLUCOSE SERPL-MCNC: 95 MG/DL — SIGNIFICANT CHANGE UP (ref 70–99)
GLUCOSE UR QL: NEGATIVE MG/DL — SIGNIFICANT CHANGE UP
HCT VFR BLD CALC: 45.1 % — HIGH (ref 34.5–45)
HGB BLD-MCNC: 14.5 G/DL — SIGNIFICANT CHANGE UP (ref 11.5–15.5)
KETONES UR-MCNC: NEGATIVE — SIGNIFICANT CHANGE UP
LEUKOCYTE ESTERASE UR-ACNC: NEGATIVE — SIGNIFICANT CHANGE UP
LIDOCAIN IGE QN: 376 U/L — SIGNIFICANT CHANGE UP (ref 73–393)
LIDOCAIN IGE QN: 768 U/L — HIGH (ref 73–393)
LYMPHOCYTES # BLD AUTO: 29 % — SIGNIFICANT CHANGE UP (ref 13–44)
LYMPHOCYTES # BLD AUTO: 4.14 K/UL — HIGH (ref 1–3.3)
MCHC RBC-ENTMCNC: 28.6 PG — SIGNIFICANT CHANGE UP (ref 27–34)
MCHC RBC-ENTMCNC: 32.2 GM/DL — SIGNIFICANT CHANGE UP (ref 32–36)
MCV RBC AUTO: 89 FL — SIGNIFICANT CHANGE UP (ref 80–100)
MONOCYTES # BLD AUTO: 0.14 K/UL — SIGNIFICANT CHANGE UP (ref 0–0.9)
MONOCYTES NFR BLD AUTO: 1 % — LOW (ref 2–14)
NEUTROPHILS # BLD AUTO: 7.85 K/UL — HIGH (ref 1.8–7.4)
NEUTROPHILS NFR BLD AUTO: 55 % — SIGNIFICANT CHANGE UP (ref 43–77)
NITRITE UR-MCNC: NEGATIVE — SIGNIFICANT CHANGE UP
NRBC # BLD: SIGNIFICANT CHANGE UP /100 WBCS (ref 0–0)
PH UR: 5 — SIGNIFICANT CHANGE UP (ref 5–8)
PLATELET # BLD AUTO: 302 K/UL — SIGNIFICANT CHANGE UP (ref 150–400)
POTASSIUM SERPL-MCNC: 3.9 MMOL/L — SIGNIFICANT CHANGE UP (ref 3.5–5.3)
POTASSIUM SERPL-SCNC: 3.9 MMOL/L — SIGNIFICANT CHANGE UP (ref 3.5–5.3)
PROT SERPL-MCNC: 8.6 G/DL — HIGH (ref 6–8.3)
PROT UR-MCNC: NEGATIVE MG/DL — SIGNIFICANT CHANGE UP
RBC # BLD: 5.07 M/UL — SIGNIFICANT CHANGE UP (ref 3.8–5.2)
RBC # FLD: 13.9 % — SIGNIFICANT CHANGE UP (ref 10.3–14.5)
SODIUM SERPL-SCNC: 137 MMOL/L — SIGNIFICANT CHANGE UP (ref 135–145)
SP GR SPEC: 1.01 — SIGNIFICANT CHANGE UP (ref 1.01–1.02)
UROBILINOGEN FLD QL: NEGATIVE MG/DL — SIGNIFICANT CHANGE UP
WBC # BLD: 14.28 K/UL — HIGH (ref 3.8–10.5)
WBC # FLD AUTO: 14.28 K/UL — HIGH (ref 3.8–10.5)

## 2021-07-21 PROCEDURE — 99284 EMERGENCY DEPT VISIT MOD MDM: CPT | Mod: 25

## 2021-07-21 PROCEDURE — 74177 CT ABD & PELVIS W/CONTRAST: CPT

## 2021-07-21 PROCEDURE — 85025 COMPLETE CBC W/AUTO DIFF WBC: CPT

## 2021-07-21 PROCEDURE — 96361 HYDRATE IV INFUSION ADD-ON: CPT

## 2021-07-21 PROCEDURE — 87086 URINE CULTURE/COLONY COUNT: CPT

## 2021-07-21 PROCEDURE — 80053 COMPREHEN METABOLIC PANEL: CPT

## 2021-07-21 PROCEDURE — 74177 CT ABD & PELVIS W/CONTRAST: CPT | Mod: 26,MA

## 2021-07-21 PROCEDURE — 99285 EMERGENCY DEPT VISIT HI MDM: CPT

## 2021-07-21 PROCEDURE — 83690 ASSAY OF LIPASE: CPT

## 2021-07-21 PROCEDURE — 87186 SC STD MICRODIL/AGAR DIL: CPT

## 2021-07-21 PROCEDURE — 96372 THER/PROPH/DIAG INJ SC/IM: CPT | Mod: XU

## 2021-07-21 PROCEDURE — 96374 THER/PROPH/DIAG INJ IV PUSH: CPT | Mod: XU

## 2021-07-21 PROCEDURE — 81003 URINALYSIS AUTO W/O SCOPE: CPT

## 2021-07-21 PROCEDURE — 96375 TX/PRO/DX INJ NEW DRUG ADDON: CPT

## 2021-07-21 PROCEDURE — 87077 CULTURE AEROBIC IDENTIFY: CPT

## 2021-07-21 PROCEDURE — 36415 COLL VENOUS BLD VENIPUNCTURE: CPT

## 2021-07-21 RX ORDER — AMLODIPINE BESYLATE 2.5 MG/1
1 TABLET ORAL
Qty: 0 | Refills: 0 | DISCHARGE

## 2021-07-21 RX ORDER — ONDANSETRON 8 MG/1
4 TABLET, FILM COATED ORAL ONCE
Refills: 0 | Status: COMPLETED | OUTPATIENT
Start: 2021-07-21 | End: 2021-07-21

## 2021-07-21 RX ORDER — FAMOTIDINE 10 MG/ML
20 INJECTION INTRAVENOUS ONCE
Refills: 0 | Status: COMPLETED | OUTPATIENT
Start: 2021-07-21 | End: 2021-07-21

## 2021-07-21 RX ORDER — EXENATIDE 250 UG/ML
0 INJECTION SUBCUTANEOUS
Qty: 0 | Refills: 0 | DISCHARGE

## 2021-07-21 RX ORDER — SODIUM CHLORIDE 9 MG/ML
1000 INJECTION INTRAMUSCULAR; INTRAVENOUS; SUBCUTANEOUS ONCE
Refills: 0 | Status: COMPLETED | OUTPATIENT
Start: 2021-07-21 | End: 2021-07-21

## 2021-07-21 RX ORDER — LIPASE/PROTEASE/AMYLASE 16-48-48K
1 CAPSULE,DELAYED RELEASE (ENTERIC COATED) ORAL
Qty: 15 | Refills: 0
Start: 2021-07-21 | End: 2021-07-25

## 2021-07-21 RX ADMIN — SODIUM CHLORIDE 1000 MILLILITER(S): 9 INJECTION INTRAMUSCULAR; INTRAVENOUS; SUBCUTANEOUS at 18:54

## 2021-07-21 RX ADMIN — SODIUM CHLORIDE 1000 MILLILITER(S): 9 INJECTION INTRAMUSCULAR; INTRAVENOUS; SUBCUTANEOUS at 16:50

## 2021-07-21 RX ADMIN — Medication 20 MILLIGRAM(S): at 18:54

## 2021-07-21 RX ADMIN — ONDANSETRON 4 MILLIGRAM(S): 8 TABLET, FILM COATED ORAL at 16:50

## 2021-07-21 RX ADMIN — SODIUM CHLORIDE 1000 MILLILITER(S): 9 INJECTION INTRAMUSCULAR; INTRAVENOUS; SUBCUTANEOUS at 20:00

## 2021-07-21 RX ADMIN — FAMOTIDINE 20 MILLIGRAM(S): 10 INJECTION INTRAVENOUS at 16:50

## 2021-07-21 RX ADMIN — SODIUM CHLORIDE 1000 MILLILITER(S): 9 INJECTION INTRAMUSCULAR; INTRAVENOUS; SUBCUTANEOUS at 17:50

## 2021-07-21 NOTE — ED PROVIDER NOTE - PATIENT PORTAL LINK FT
You can access the FollowMyHealth Patient Portal offered by [facility] by registering at the following website: [email or web address]. By joining [employer]’s FollowMyHealth portal, you will also be able to view your health information using other applications (apps) compatible with our system.

## 2021-07-21 NOTE — ED PROVIDER NOTE - PROGRESS NOTE DETAILS
David Badillo for attending Dr. Hernandez: 67 y/o F with a PMHx of DM2, essential HTN, IBS, GERD, osteoarthritis, overactive bladder, gout, chronic pancreatitis, diverticulitis, morbid obesity s/p R hip replacement, L cataract surgery, D&C, hysterectomy, kidney stones, cholecystectomy, lap-band surgery presents to ED c/o multiple episodes of diarrhea a day, and R flank pain. Denies blood in stool, CP, SOB, fever.Pt reports she has been sick since 6/20. Pt went to UC x2 weeks ago and was told blood and stool sample were normal. Pt saw her GI last week and gave another stool sample but sx worsened this past weekend and so she gave another stool sample on 7/19. Pt was seen in ED last night for same sx and nausea.     On exam, pt is laying in bed and appears to be comfortable at this time. NAD, pt is noted to be obese, NC/AT, PERRL, Heart RR, Lungs CTA, abd: soft with diffuse TTP to lower abd region. Reevaluated patient at bedside.  Patient feeling much improved. Repeat lipase normal. Pt requesting food, no vomiting in ED. Will d/c home and f/u GI. Discussed the results of all diagnostic testing in ED and copies of all reports given.   An opportunity to ask questions was given.  Discussed the importance of prompt, close medical follow-up.  Patient will return with any changes, concerns or persistent / worsening symptoms.  Understanding of all instructions verbalized. Spoke with GI, Dr. Recinos covering for pt GI, discussed case and results, advised likely chronic pancreatitis, advised to rx creon before each meal and f/u office tomorrow.

## 2021-07-21 NOTE — ED PROVIDER NOTE - GASTROINTESTINAL, MLM
obese, +diffuse abdomen ttp, multiple old healed prior surgical scars noted, no guarding or rebound, no CVAT B

## 2021-07-21 NOTE — ED PROVIDER NOTE - PSH
History of cataract surgery, left    History of Cholecystectomy- 2011/April 2011  History of kidney stones  s/p percutaneous stone extraction 2011  Lap-Band Surgery - 2006  Removal of Lap band- 03/2017  OAB (overactive bladder)  s/p Interstim insertion-04/2016  revision 2017, Replacement interstim implant 8/2018  S/P D&C (status post dilation and curettage)  2010 and 2014  S/P hip replacement, right  2018  S/P hysterectomy  2014

## 2021-07-21 NOTE — ED PROVIDER NOTE - CARE PLAN
Principal Discharge DX:	Abdominal pain   Principal Discharge DX:	Abdominal pain  Secondary Diagnosis:	Chronic pancreatitis  Secondary Diagnosis:	Diarrhea

## 2021-07-21 NOTE — ED PROVIDER NOTE - ATTENDING CONTRIBUTION TO CARE
David Badillo for attending Dr. Hernandez: 67 y/o F with a PMHx of DM2, essential HTN, IBS, GERD, osteoarthritis, overactive bladder, gout, chronic pancreatitis, diverticulitis, morbid obesity s/p R hip replacement, L cataract surgery, D&C, hysterectomy, kidney stones, cholecystectomy, lap-band surgery presents to ED c/o multiple episodes of diarrhea a day, and R flank pain. Denies blood in stool, CP, SOB, fever. Pt reports she has been sick since 6/20. Pt went to UC x2 weeks ago and was told blood and stool sample were normal. Pt saw her GI last week and gave another stool sample but sx worsened this past weekend and so she gave another stool sample on 7/19. Pt was seen in ED last night for same sx and nausea.     On exam, pt is laying in bed and appears to be comfortable at this time. NAD, pt is noted to be obese, NC/AT, PERRL, Heart RR, Lungs CTA, abd: soft with diffuse TTP to lower abd region. No focal neurological deficits.   Pt presenting with right sided abdominal pain and diarrhea. Will obtain screening labs, CT abd/pelvis, will hydrate and medicate for symptoms

## 2021-07-21 NOTE — ED ADULT NURSE NOTE - PSH
Problem List Items Addressed This Visit        Genitourinary    Benign localized prostatic hyperplasia with lower urinary tract symptoms (LUTS) - Primary            Discussion:  I had a very nice visit with Axel Zuleta  and his female partner today  His orchialgia remains well treated from his varicocelectomy previously, his main complaint today is his lower urinary tract symptoms, mostly hesitancy and slow urinary stream and some burning at terminal urination  He did try the tamsulosin, this centrally did not change his symptoms, he is interested in cystoscopy and transrectal ultrasound for workup for further therapies such as Uro lift  His PSA is normal, indicating a low risk for prostate cancer as a cause of his symptoms  No new systemic complaints  All questions and concerns answered and addressed    Assessment and plan:       Please see problem oriented charting for the assessment plan of today's urological complaints    Alicia Gallegos MD      Chief Complaint     Chief Complaint   Patient presents with    Benign Prostatic Hypertrophy    varicocele         History of Present Illness     Bhupinder Sheridan is a 38 y o  man with a history of left orchialgia as well as left varicocele, he is status post a subinguinal varicocelectomy on August 27, 2019  He was seen in January, at which point all of his symptoms had resolved, he presents today with continued lower urinary tract symptoms  His scrotal pain has completely resolved since undergoing varicocelectomy  He was also seen for some difficulty urinating, for this reason he was started on tamsulosin at his last visit  Since starting this medication in terms of his symptoms he states that he did not have much improvement in this, in terms of interest in further therapies for lower urinary tract symptoms he wishes to proceed to Uro lift if he is a candidate      New complaints include some burning at terminal urination, also slow urinary stream and hesitancy  The following portions of the patient's history were reviewed and updated as appropriate: allergies, current medications, past family history, past medical history, past social history, past surgical history and problem list     Detailed Urologic History     - please refer to HPI    Review of Systems     Review of Systems   Constitutional: Negative  HENT: Negative  Eyes: Negative  Respiratory: Negative  Cardiovascular: Negative  Gastrointestinal: Negative  Endocrine: Negative  Genitourinary: Positive for dysuria and urgency  Musculoskeletal: Negative  Skin: Negative  Allergic/Immunologic: Negative  Neurological: Negative  Hematological: Negative  Psychiatric/Behavioral: Negative  Allergies     No Known Allergies    Physical Exam     Physical Exam   Constitutional: He is oriented to person, place, and time  He appears well-developed and well-nourished  No distress  HENT:   Head: Normocephalic and atraumatic  Eyes: Right eye exhibits no discharge  Left eye exhibits no discharge  Neck: No tracheal deviation present  Cardiovascular: Intact distal pulses  Pulmonary/Chest: Effort normal  No stridor  No respiratory distress  Abdominal: He exhibits no distension  Musculoskeletal: He exhibits no deformity  Neurological: He is alert and oriented to person, place, and time  No cranial nerve deficit  Coordination normal    Skin: Skin is warm and dry  No rash noted  He is not diaphoretic  No erythema  No pallor  Psychiatric: He has a normal mood and affect  His behavior is normal  Judgment and thought content normal    Nursing note and vitals reviewed            Vital Signs  Vitals:    07/20/20 1243   BP: 130/68   Pulse: 66   Temp: 99 °F (37 2 °C)   Weight: 61 1 kg (134 lb 12 8 oz)   Height: 5' 3" (1 6 m)         Current Medications       Current Outpatient Medications:     lamoTRIgine (LaMICtal) 25 mg tablet, , Disp: , Rfl:    QUEtiapine (SEROquel) 400 MG tablet, , Disp: , Rfl:     docusate sodium (COLACE) 100 mg capsule, Take 1 capsule (100 mg total) by mouth 3 (three) times a day for 14 days, Disp: 42 capsule, Rfl: 0    DULoxetine (CYMBALTA) 60 mg delayed release capsule, , Disp: , Rfl:     meloxicam (MOBIC) 7 5 mg tablet, Take 7 5 mg by mouth 2 (two) times a day, Disp: , Rfl: 1    tamsulosin (FLOMAX) 0 4 mg, Take 1 capsule (0 4 mg total) by mouth daily at bedtime, Disp: 30 capsule, Rfl: 3      Active Problems     Patient Active Problem List   Diagnosis    Bipolar disease, chronic (HCC)    Chronic bilateral low back pain with left-sided sciatica    Depression with anxiety    Fibromyalgia    Hypogonadism male    Chronic neck pain    Pure hypercholesterolemia    Other hemorrhoids    Chronic idiopathic constipation    Benign localized prostatic hyperplasia with lower urinary tract symptoms (LUTS)         Past Medical History     Past Medical History:   Diagnosis Date    Anxiety     Bipolar disorder (HCC)     Chronic back pain     with left sided sciatica    Constipation     Depression     Fibromyalgia, primary     Hemorrhoids     last assessed - [id number]    Hyperlipidemia          Surgical History     Past Surgical History:   Procedure Laterality Date    CERVICAL FUSION      Cervical Vertbral fusion    WA EXCISE VARICOCELE Left 8/27/2019    Procedure: VARICOCELECTOMY;  Surgeon: Ivy Hatfield MD;  Location: [facility];  Service: Urology         Family History     Family History   Problem Relation Age of Onset    Arthritis Mother     Hyperlipidemia Mother     Hyperthyroidism Father     Other Father         Prostate disease    Colon cancer Sister 40    Other Sister         Epilepsy    Asthma Brother          Social History     Social History     Social History     Tobacco Use   Smoking Status Never Smoker   Smokeless Tobacco Never Used         Pertinent Lab Values     Lab Results   Component Value Date CREATININE 1 30 06/29/2020       Lab Results   Component Value Date    PSA 0 9 07/16/2020             Pertinent Imaging      No new imaging for my review History of cataract surgery, left    History of Cholecystectomy- 2011/April 2011  History of kidney stones  s/p percutaneous stone extraction 2011  Lap-Band Surgery - 2006  Removal of Lap band- 03/2017  OAB (overactive bladder)  s/p Interstim insertion-04/2016  revision 2017, Replacement interstim implant 8/2018  S/P D&C (status post dilation and curettage)  2010 and 2014  S/P hip replacement, right  2018  S/P hysterectomy  2014

## 2021-07-21 NOTE — ED PROVIDER NOTE - CLINICAL SUMMARY MEDICAL DECISION MAKING FREE TEXT BOX
67 y/o F with hx of HTN, IBS, diverticulitis, chronic pancreatitis, DM, GERD presents with c/o diarrhea x 1 month. States that she has had 3-4 episodes of non-bloody watery diarrhea since 6/20, took imodium few times. States that she also has had diffuse abdominal pain. States that she was seen for same yesterday and was supposed to get ct scan but was unsure and hence came back to ED today. States that she had 2 episodes of vomiting today. Denies fever, dysuria, hematuria, CP, SOB, recent travel, known sick contacts. States that she has hx of lap band which was removed. States that she was seen in urgent care over a week ago and had stool samples and blood work done, which were neg as per pt. States that she saw her GI, Dr. Laws on 7/12 and had stool samples tested again, symptoms worsened again so she gave another stool sample on 7/19; awaiting results and was advised that she will need colonoscopy. PE: as above; will get labs, UA, ct abdomen/pelvis, ivf/zofran/pepcid, speak with GI, reassess

## 2021-07-21 NOTE — ED ADULT TRIAGE NOTE - CHIEF COMPLAINT QUOTE
" I was seen here last night, I am still having diarrhea and abdominal pain, I did not want to have a Ct Scan last night but now I can "

## 2021-07-21 NOTE — ED ADULT NURSE NOTE - OBJECTIVE STATEMENT
Patient presents c/o diarrhea and nausea X 1 month. Patient was seen in the ED yesterday for same and CT was recommended. Patient states she declined because she thought she was not permitted to have it due to her implanted bladder stimulator. Patient called Dr. Dela Cruz today and he told her to have the test as it is an MRI that is restricted. Patient states 2 episodes of liquid stool today. States the nausea was improved last night when she left the ED but it has recurred today as well. Patient c/o crampy abdominal pains localized to left abdomen.

## 2021-07-21 NOTE — ED PROVIDER NOTE - OBJECTIVE STATEMENT
67 y/o F with hx of HTN, IBS, diverticulitis, chronic pancreatitis, DM, GERD presents with c/o diarrhea x 1 month. States that she has had 3-4 episodes of non-bloody watery diarrhea since 6/20, took imodium few times. States that she also has had diffuse abdominal pain. States that she was seen for same yesterday and was supposed to get ct scan but was unsure and hence came back to ED today. States that she had 2 episodes of vomiting today. Denies fever, dysuria, hematuria, CP, SOB, recent travel, known sick contacts. States that she has hx of lap band which was removed. States that she was seen in urgent care over a week ago and had stool samples and blood work done, which were neg as per pt. States that she saw her GI, Dr. Laws on 7/12 and had stool samples tested again, symptoms worsened again so she gave another stool sample on 7/19; awaiting results and was advised that she will need colonoscopy. 67 y/o F with hx of HTN, IBS, diverticulitis, chronic pancreatitis, DM, GERD presents with c/o diarrhea x 1 month. States that she has had 3-4 episodes of non-bloody watery diarrhea since 6/20, took imodium few times. States that she also has had diffuse abdominal pain. States that she was seen for same yesterday and was supposed to get ct scan but was unsure and hence came back to ED today. States that she had 2 episodes of vomiting today. Denies fever, dysuria, hematuria, CP, SOB, recent travel, recent abx use, known sick contacts. States that she has hx of lap band which was removed. States that she was seen in urgent care over a week ago and had stool samples and blood work done, which were neg as per pt. States that she saw her GI, Dr. Laws on 7/12 and had stool samples tested again, symptoms worsened again so she gave another stool sample on 7/19; awaiting results and was advised that she will need colonoscopy.

## 2021-07-21 NOTE — ED ADULT NURSE NOTE - NSIMPLEMENTINTERV_GEN_ALL_ED
Implemented All Universal Safety Interventions:  [place] to call system. Call bell, personal items and telephone within reach. Instruct patient to call for assistance. Room bathroom lighting operational. Non-slip footwear when patient is off stretcher. Physically safe environment: no spills, clutter or unnecessary equipment. Stretcher in lowest position, wheels locked, appropriate side rails in place.

## 2021-07-21 NOTE — ED PROVIDER NOTE - CARE PROVIDER_API CALL
Olga Laws  GASTROENTEROLOGY  [address] [facility], [address]  [place] [address]  Phone: [phone]  Fax: [phone]  Follow Up Time: 1-3 Days

## 2021-07-21 NOTE — ED PROVIDER NOTE - NSFOLLOWUPINSTRUCTIONS_ED_ALL_ED_FT
Follow up with your gastroenterolist tomorrow for re-evaluation, ongoing care and treatment. Drink plenty of fluids, take creon as prescribed. If having worsening of symptoms, fever, unable to keep down liquids or other related symptoms, RETURN TO THE ER IMMEDIATELY.     Abdominal Pain    WHAT YOU NEED TO KNOW:    Abdominal pain can be dull, achy, or sharp. You may have pain in one area of your abdomen, or in your entire abdomen. Your pain may be caused by a condition such as constipation, food sensitivity or poisoning, infection, or a blockage. Abdominal pain can also be from a hernia, appendicitis, or an ulcer. Liver, gallbladder, or kidney conditions can also cause abdominal pain. The cause of your abdominal pain may be unknown.     DISCHARGE INSTRUCTIONS:    Return to the emergency department if:   •You have new chest pain or shortness of breath.      •You have pulsing pain in your upper abdomen or lower back that suddenly becomes constant.      •Your pain is in the right lower abdominal area and worsens with movement.       •You have a fever over 100.4°F (38°C) or shaking chills.       •You are vomiting and cannot keep food or liquids down.       •Your pain does not improve or gets worse over the next 8 to 12 hours.       •You see blood in your vomit or bowel movements, or they look black and tarry.       •Your skin or the whites of your eyes turn yellow.       •You are a woman and have a large amount of vaginal bleeding that is not your monthly period.       Contact your healthcare provider if:   •You have pain in your lower back.       •You are a man and have pain in your testicles.      •You have pain when you urinate.       •You have questions or concerns about your condition or care.      Follow up with your healthcare provider within 24 hours or as directed: Write down your questions so you remember to ask them during your visits.     Medicines:   •Medicines may be given to calm your stomach and prevent vomiting or to decrease pain. Ask how to take pain medicine safely.      •Take your medicine as directed. Contact your healthcare provider if you think your medicine is not helping or if you have side effects. Tell him of her if you are allergic to any medicine. Keep a list of the medicines, vitamins, and herbs you take. Include the amounts, and when and why you take them. Bring the list or the pill bottles to follow-up visits. Carry your medicine list with you in case of an emergency.

## 2021-07-23 ENCOUNTER — APPOINTMENT (OUTPATIENT)
Dept: UROLOGY | Facility: CLINIC | Age: 66
End: 2021-07-23
Payer: MEDICARE

## 2021-07-23 LAB
-  AMIKACIN: SIGNIFICANT CHANGE UP
-  AMOXICILLIN/CLAVULANIC ACID: SIGNIFICANT CHANGE UP
-  AMPICILLIN/SULBACTAM: SIGNIFICANT CHANGE UP
-  AMPICILLIN: SIGNIFICANT CHANGE UP
-  AZTREONAM: SIGNIFICANT CHANGE UP
-  CEFAZOLIN: SIGNIFICANT CHANGE UP
-  CEFEPIME: SIGNIFICANT CHANGE UP
-  CEFOXITIN: SIGNIFICANT CHANGE UP
-  CEFTRIAXONE: SIGNIFICANT CHANGE UP
-  CIPROFLOXACIN: SIGNIFICANT CHANGE UP
-  ERTAPENEM: SIGNIFICANT CHANGE UP
-  GENTAMICIN: SIGNIFICANT CHANGE UP
-  LEVOFLOXACIN: SIGNIFICANT CHANGE UP
-  MEROPENEM: SIGNIFICANT CHANGE UP
-  NITROFURANTOIN: SIGNIFICANT CHANGE UP
-  PIPERACILLIN/TAZOBACTAM: SIGNIFICANT CHANGE UP
-  TOBRAMYCIN: SIGNIFICANT CHANGE UP
-  TRIMETHOPRIM/SULFAMETHOXAZOLE: SIGNIFICANT CHANGE UP
CULTURE RESULTS: SIGNIFICANT CHANGE UP
METHOD TYPE: SIGNIFICANT CHANGE UP
ORGANISM # SPEC MICROSCOPIC CNT: SIGNIFICANT CHANGE UP
ORGANISM # SPEC MICROSCOPIC CNT: SIGNIFICANT CHANGE UP
SPECIMEN SOURCE: SIGNIFICANT CHANGE UP

## 2021-07-23 PROCEDURE — 99212 OFFICE O/P EST SF 10 MIN: CPT

## 2021-07-23 NOTE — ASSESSMENT
[FreeTextEntry1] : The patient is a 66 year old female presenting today for a renal US. \par She reports she has had diarrhea for one month and has been to the hospital and her gastroenterologist for this. \par She has also had abdominal cramping. \par She had flank pain earlier this week. \par She has a bladder pacemaker to decrease urinary frequency.\par When hospitalized, her creatinine was 0.94 and she had a fever of 100.4.\par \par Her US from 7/23/21 was cancelled.\par \par The patient produced a urine sample which will be sent for urinalysis and urine culture. \par \par The CT Abdomen and Pelvis from 7/21/21 showed:\par Multiple stones in right kidney measuring up to 7mm \par No left sided stones\par 6mm upper pole right renal calculus\par \par On review of the CT, she has several stones in the right lower calyx. \par No stones visualized in the ureter.\par \par I advised her that we will watch the stones because I don't believe they are the cause of her pain at this point. \par I think she should return to her GI. \par \par The patient will return to the office for her scheduled follow up in 5 months. \par \par I spent 25 minutes with the patient.

## 2021-07-23 NOTE — HISTORY OF PRESENT ILLNESS
[FreeTextEntry1] : The patient is a 66 year old female presenting today for a renal US. \par She reports she has had diarrhea for one month and has been to the hospital and her gastroenterologist for this. \par She has also had abdominal cramping. \par She had flank pain earlier this week. \par She has a bladder pacemaker to decrease urinary frequency.\par When hospitalized, her creatinine was 0.94 and she had a fever of 100.4.\par \par Her US from 7/23/21 was cancelled.\par \par The patient produced a urine sample which will be sent for urinalysis and urine culture. \par \par The CT Abdomen and Pelvis from 7/21/21 showed:\par Multiple stones in right kidney measuring up to 7mm \par No left sided stones\par 6mm upper pole right renal calculus\par \par On review of the CT, she has several stones in the right lower calyx. \par No stones visualized in the ureter.\par \par I advised her that we will watch the stones because I don't believe they are the cause of her pain at this point. \par I think she should return to her GI. \par \par The patient will return to the office for her scheduled follow up in 5 months.

## 2021-07-24 RX ORDER — CEFUROXIME AXETIL 250 MG
1 TABLET ORAL
Qty: 14 | Refills: 0
Start: 2021-07-24 | End: 2021-07-30

## 2021-07-24 NOTE — ED POST DISCHARGE NOTE - RESULT SUMMARY
urine culture +, states having urinary dysuria. cefuroxime prescribed , states saw renal and PCP yesterday feeling slightly better but still having diarrhea, saw GI and had stool sample sent and scheduled for colonoscopy, advised to stay hydrated and return if any worsening symptoms.

## 2021-07-26 LAB
APPEARANCE: ABNORMAL
BACTERIA UR CULT: NORMAL
BACTERIA: NEGATIVE
BILIRUBIN URINE: NEGATIVE
BLOOD URINE: NEGATIVE
CALCIUM OXALATE CRYSTALS: ABNORMAL
COLOR: YELLOW
GLUCOSE QUALITATIVE U: NEGATIVE
HYALINE CASTS: 0 /LPF
KETONES URINE: NEGATIVE
LEUKOCYTE ESTERASE URINE: NEGATIVE
MICROSCOPIC-UA: NORMAL
NITRITE URINE: NEGATIVE
PH URINE: 6
PROTEIN URINE: NORMAL
RED BLOOD CELLS URINE: 2 /HPF
SPECIFIC GRAVITY URINE: 1.02
SQUAMOUS EPITHELIAL CELLS: 2 /HPF
UROBILINOGEN URINE: NORMAL
WHITE BLOOD CELLS URINE: 3 /HPF

## 2021-08-09 DIAGNOSIS — N32.81 OVERACTIVE BLADDER: ICD-10-CM

## 2021-08-09 DIAGNOSIS — N39.41 URGE INCONTINENCE: ICD-10-CM

## 2021-08-09 DIAGNOSIS — R39.15 URGENCY OF URINATION: ICD-10-CM

## 2021-09-08 ENCOUNTER — APPOINTMENT (OUTPATIENT)
Dept: UROLOGY | Facility: CLINIC | Age: 66
End: 2021-09-08
Payer: MEDICARE

## 2021-09-22 ENCOUNTER — APPOINTMENT (OUTPATIENT)
Dept: UROLOGY | Facility: CLINIC | Age: 66
End: 2021-09-22
Payer: MEDICARE

## 2021-09-22 ENCOUNTER — OUTPATIENT (OUTPATIENT)
Dept: OUTPATIENT SERVICES | Facility: HOSPITAL | Age: 66
LOS: 1 days | End: 2021-09-22
Payer: COMMERCIAL

## 2021-09-22 DIAGNOSIS — N32.81 OVERACTIVE BLADDER: Chronic | ICD-10-CM

## 2021-09-22 DIAGNOSIS — Z98.89 OTHER SPECIFIED POSTPROCEDURAL STATES: Chronic | ICD-10-CM

## 2021-09-22 DIAGNOSIS — Z96.641 PRESENCE OF RIGHT ARTIFICIAL HIP JOINT: Chronic | ICD-10-CM

## 2021-09-22 DIAGNOSIS — Z98.42 CATARACT EXTRACTION STATUS, LEFT EYE: Chronic | ICD-10-CM

## 2021-09-22 DIAGNOSIS — R39.15 URGENCY OF URINATION: ICD-10-CM

## 2021-09-22 DIAGNOSIS — Z90.710 ACQUIRED ABSENCE OF BOTH CERVIX AND UTERUS: Chronic | ICD-10-CM

## 2021-09-22 DIAGNOSIS — R35.0 FREQUENCY OF MICTURITION: ICD-10-CM

## 2021-09-22 DIAGNOSIS — Z87.442 PERSONAL HISTORY OF URINARY CALCULI: Chronic | ICD-10-CM

## 2021-09-22 PROCEDURE — 99214 OFFICE O/P EST MOD 30 MIN: CPT

## 2021-09-22 PROCEDURE — 95972 ALYS CPLX SP/PN NPGT W/PRGRM: CPT

## 2021-09-22 PROCEDURE — 51798 US URINE CAPACITY MEASURE: CPT

## 2021-09-22 NOTE — HISTORY OF PRESENT ILLNESS
[FreeTextEntry1] : 65 yo woman with OAB and urge incontinence SP SNS April 2016 and ? revision in 2018, here today for SNS programming and check .Recent colitis in June 2021 on prednisone . Pending Upper GI imaging results from last week. PSH lap band placement and removal in March 2017. Voids with some urgency Q 2 h hours during the day and nocturia x 1 with use of Depends lined with pad X  2 per day.Fluids 24 oz X 2-3 while at home and not adequate fruits or vegetables intake. She has been very busy with caring for her ailing mother.Occasional constipation managed with senna tabs. Meds reconciled.\par PVR 0  ml.\par  Right sided SNS was checked and had trouble synching with her device. \par patient is active with program 2 (3- 2- 0+) at 1.7 AmpTrouble synching and did program with her own .She sensed some radiating to her right big toe and some electric sensations to her buttock cheek. Switched to # 3 AT 1.3 felt in vaginal area   Battery life 17-32  months and no impedance noted. \par Life style behavior modifications- fluids management - increase fluids - water intake from \par 40  to 64  oz .Fiber intake and dietary modifications. \par Trospium chloride 20  mg po q 12 h as she tolerated her 1 pill daily .RTO in 6 months to see Dr Xie to establish care.

## 2021-09-22 NOTE — PHYSICAL EXAM
[General Appearance - Well Developed] : well developed [General Appearance - Well Nourished] : well nourished [Normal Appearance] : normal appearance [Well Groomed] : well groomed [General Appearance - In No Acute Distress] : no acute distress [Abdomen Soft] : soft [Abdomen Tenderness] : non-tender [Costovertebral Angle Tenderness] : no ~M costovertebral angle tenderness [Urinary Bladder Findings] : the bladder was normal on palpation [Edema] : no peripheral edema [] : no respiratory distress [Respiration, Rhythm And Depth] : normal respiratory rhythm and effort [Exaggerated Use Of Accessory Muscles For Inspiration] : no accessory muscle use [Oriented To Time, Place, And Person] : oriented to person, place, and time [Mood] : the mood was normal [Affect] : the affect was normal [Not Anxious] : not anxious [Normal Station and Gait] : the gait and station were normal for the patient's age

## 2021-09-22 NOTE — ASSESSMENT
[FreeTextEntry1] : 67 yo woman with OAB and urge incontinence SP SNS April 2016 and ? revision in 2018, here today for SNS programming and check .Recent colitis in June 2021 on prednisone . Pending Upper GI imaging results from last week. PSH lap band placement and removal in March 2017. Voids with some urgency Q 2 h hours during the day and nocturia x 1 with use of Depends lined with pad X  2 per day.Fluids 24 oz X 2-3 while at home and not adequate fruits or vegetables intake. She has been very busy with caring for her ailing mother.Occasional constipation managed with senna tabs. Meds reconciled.\par PVR 0  ml.\par  Right sided SNS was checked and had trouble synching with her device. \par patient is active with program 2 (3- 2- 0+) at 1.7 AmpTrouble synching and did program with her own .She sensed some radiating to her right big toe and some electric sensations to her buttock cheek. Switched to # 3 AT 1.3 felt in vaginal area   Battery life 17-32  months and no impedance noted. \par Life style behavior modifications- fluids management - increase fluids - water intake from \par 40  to 64  oz .Fiber intake and dietary modifications. \par Trospium chloride 20  mg po q 12 h as she tolerated her 1 pill daily .RTO in 6 months to see Dr Xie to establish care.

## 2021-09-23 DIAGNOSIS — R39.15 URGENCY OF URINATION: ICD-10-CM

## 2021-09-23 DIAGNOSIS — R35.0 FREQUENCY OF MICTURITION: ICD-10-CM

## 2021-09-23 DIAGNOSIS — N39.41 URGE INCONTINENCE: ICD-10-CM

## 2021-11-09 ENCOUNTER — APPOINTMENT (OUTPATIENT)
Dept: BARIATRICS/WEIGHT MGMT | Facility: CLINIC | Age: 66
End: 2021-11-09

## 2021-12-15 NOTE — PATIENT PROFILE ADULT - CAREGIVER RELATION TO PATIENT
Daughter Orbicularis Oris Muscle Flap Text: The defect edges were debeveled with a #15 scalpel blade.  Given that the defect affected the competency of the oral sphincter an obicularis oris muscle flap was deemed most appropriate to restore this competency and normal muscle function.  Using a sterile surgical marker, an appropriate flap was drawn incorporating the defect. The area thus outlined was incised with a #15 scalpel blade.

## 2021-12-27 ENCOUNTER — APPOINTMENT (OUTPATIENT)
Dept: UROLOGY | Facility: CLINIC | Age: 66
End: 2021-12-27

## 2022-01-04 ENCOUNTER — APPOINTMENT (OUTPATIENT)
Dept: UROLOGY | Facility: CLINIC | Age: 67
End: 2022-01-04
Payer: MEDICARE

## 2022-01-04 PROCEDURE — 99212 OFFICE O/P EST SF 10 MIN: CPT

## 2022-01-04 NOTE — HISTORY OF PRESENT ILLNESS
[FreeTextEntry1] : The patient is a 66 year old female presenting today for evaluation of microscopic hematuria. \par She completed a Litholink on 1/3/22, but the results have not been processed yet. \par The main reason for his visit is at a routine check up with her primary, they found microscopic hematuria. \par She reports that over the holidays, her urine was dark in color, but she attributed this to dehydration. \par She is not experiencing any associated pain. \par \par The CT Abdomen and Pelvis from 7/21/21 showed:\par Multiple stones in right kidney measuring up to 7mm \par No left sided stones\par 6mm upper pole right renal calculus\par \par On review of the CT, she has several stones in the right lower calyx. \par No stones visualized in the ureter.\par \par The patient produced a urine sample which will be sent for urinalysis and urine culture. \par \par I advised her that the microscopic hematuria may have been caused by the stones in her kidney. \par I recommend the patient have a right-sided ureteroscopy with stone removal. \par \par I have discussed the patient with Dr. Murrieta, who agreed to schedule the patient for URS. \par \par The patient will return to the office after the procedure to follow up with Dr. Murrieta.

## 2022-01-04 NOTE — ASSESSMENT
[FreeTextEntry1] : The patient is a 66 year old female presenting today for evaluation of microscopic hematuria. \par She completed a Litholink on 1/3/22, but the results have not been processed yet. \par The main reason for his visit is at a routine check up with her primary, they found microscopic hematuria. \par She reports that over the holidays, her urine was dark in color, but she attributed this to dehydration. \par She is not experiencing any associated pain. \par \par The CT Abdomen and Pelvis from 7/21/21 showed:\par Multiple stones in right kidney measuring up to 7mm \par No left sided stones\par 6mm upper pole right renal calculus\par \par On review of the CT, she has several stones in the right lower calyx. \par No stones visualized in the ureter.\par \par The patient produced a urine sample which will be sent for urinalysis and urine culture. \par \par I advised her that the microscopic hematuria may have been caused by the stones in her kidney. \par I recommend the patient have a right-sided ureteroscopy with stone removal. \par \par I have discussed the patient with Dr. Murrieta, who agreed to schedule the patient for URS. \par \par The patient will return to the office after the procedure to follow up with Dr. Murrieta. \par \par I spent 15 minutes with the patient.

## 2022-01-04 NOTE — ADDENDUM
[FreeTextEntry1] : I, Bethany Hamilton, acted as the sole scribe for Dr. Jagdeep Watts on 01/04/2022.

## 2022-01-05 NOTE — ED ADULT NURSE NOTE - NS ED NOTE ABUSE RESPONSE YN
Implemented All Fall Risk Interventions:  [place] to call system. Call bell, personal items and telephone within reach. Instruct patient to call for assistance. Room bathroom lighting operational. Non-slip footwear when patient is off stretcher. Physically safe environment: no spills, clutter or unnecessary equipment. Stretcher in lowest position, wheels locked, appropriate side rails in place. Provide visual cue, wrist band, yellow gown, etc. Monitor gait and stability. Monitor for mental status changes and reorient to person, place, and time. Review medications for side effects contributing to fall risk. Reinforce activity limits and safety measures with patient and family.
Yes

## 2022-01-07 LAB
APPEARANCE: ABNORMAL
BACTERIA UR CULT: NORMAL
BACTERIA: NEGATIVE
BILIRUBIN URINE: NEGATIVE
BLOOD URINE: ABNORMAL
CALCIUM OXALATE CRYSTALS: ABNORMAL
COLOR: YELLOW
GLUCOSE QUALITATIVE U: NEGATIVE
HYALINE CASTS: 3 /LPF
KETONES URINE: NEGATIVE
LEUKOCYTE ESTERASE URINE: ABNORMAL
MICROSCOPIC-UA: NORMAL
NITRITE URINE: NEGATIVE
PH URINE: 6
PROTEIN URINE: ABNORMAL
RED BLOOD CELLS URINE: 59 /HPF
SPECIFIC GRAVITY URINE: 1.03
SQUAMOUS EPITHELIAL CELLS: 13 /HPF
UROBILINOGEN URINE: NORMAL
WHITE BLOOD CELLS URINE: 11 /HPF

## 2022-01-26 ENCOUNTER — APPOINTMENT (OUTPATIENT)
Dept: UROLOGY | Facility: CLINIC | Age: 67
End: 2022-01-26
Payer: MEDICARE

## 2022-01-26 PROCEDURE — 99212 OFFICE O/P EST SF 10 MIN: CPT

## 2022-01-26 NOTE — HISTORY OF PRESENT ILLNESS
[FreeTextEntry1] : The patient is a 67 year old female presenting today to review Litholink results for a history of kidney stones. \par She reports she is tentatively scheduled for a right-sided URS next month to remove her kidney stones in her right kidney.\par She also reports she has colitis that is being treated with Budesonide. She has troubles with diarrhea as a result of her colitis. Her last colonoscopy was during the summer of 2021. \par \par Her Litholink from 1/13/21 showed low urine output, low citrate, SS CaOx, and SS UA.\par \par The CT Abdomen and Pelvis from 7/21/21 showed:\par Multiple stones in right kidney measuring up to 7mm \par No left sided stones\par 6mm upper pole right renal calculus\par \par I recommend the patient increase her fluid intake. I am also referring the patient to Dr. Home Garcia for a second opinion for her colitis. \par \par I recommend she have the URS to remove the stones. After the surgery, she will follow up with Dr. Murrieta. \par \par The patient will return to the office after the surgery to follow up with Dr. Murrieta.

## 2022-01-26 NOTE — ASSESSMENT
[FreeTextEntry1] : The patient is a 67 year old female presenting today to review Litholink results for a history of kidney stones. \par She reports she is tentatively scheduled for a right-sided URS next month to remove her kidney stones in her right kidney.\par She also reports she has colitis that is being treated with Budesonide. She has troubles with diarrhea as a result of her colitis. Her last colonoscopy was during the summer of 2021. \par \par Her Litholink from 1/13/21 showed low urine output, low citrate, SS CaOx, and SS UA.\par \par The CT Abdomen and Pelvis from 7/21/21 showed:\par Multiple stones in right kidney measuring up to 7mm \par No left sided stones\par 6mm upper pole right renal calculus\par \par I recommend the patient increase her fluid intake. I am also referring the patient to Dr. Home Garcia for a second opinion for her colitis. \par \par I recommend she have the URS to remove the stones. After the surgery, she will follow up with Dr. Murrieta. \par \par The patient will return to the office after the surgery to follow up with Dr. Murrieta. \par \par I spent 15 minutes with the patient.

## 2022-01-26 NOTE — ADDENDUM
[FreeTextEntry1] : I, Bethany Hamilton, acted as the sole scribe for Dr. Jagdeep Watts on 01/26/2022.

## 2022-02-09 ENCOUNTER — OUTPATIENT (OUTPATIENT)
Dept: OUTPATIENT SERVICES | Facility: HOSPITAL | Age: 67
LOS: 1 days | End: 2022-02-09
Payer: COMMERCIAL

## 2022-02-09 VITALS
DIASTOLIC BLOOD PRESSURE: 90 MMHG | RESPIRATION RATE: 18 BRPM | WEIGHT: 293 LBS | OXYGEN SATURATION: 99 % | SYSTOLIC BLOOD PRESSURE: 140 MMHG | HEIGHT: 62 IN | TEMPERATURE: 98 F | HEART RATE: 87 BPM

## 2022-02-09 DIAGNOSIS — Z96.642 PRESENCE OF LEFT ARTIFICIAL HIP JOINT: Chronic | ICD-10-CM

## 2022-02-09 DIAGNOSIS — G47.33 OBSTRUCTIVE SLEEP APNEA (ADULT) (PEDIATRIC): ICD-10-CM

## 2022-02-09 DIAGNOSIS — E66.01 MORBID (SEVERE) OBESITY DUE TO EXCESS CALORIES: ICD-10-CM

## 2022-02-09 DIAGNOSIS — Z96.641 PRESENCE OF RIGHT ARTIFICIAL HIP JOINT: Chronic | ICD-10-CM

## 2022-02-09 DIAGNOSIS — Z90.89 ACQUIRED ABSENCE OF OTHER ORGANS: Chronic | ICD-10-CM

## 2022-02-09 DIAGNOSIS — N32.81 OVERACTIVE BLADDER: Chronic | ICD-10-CM

## 2022-02-09 DIAGNOSIS — Z90.710 ACQUIRED ABSENCE OF BOTH CERVIX AND UTERUS: Chronic | ICD-10-CM

## 2022-02-09 DIAGNOSIS — Z87.442 PERSONAL HISTORY OF URINARY CALCULI: Chronic | ICD-10-CM

## 2022-02-09 DIAGNOSIS — N20.0 CALCULUS OF KIDNEY: ICD-10-CM

## 2022-02-09 DIAGNOSIS — Z98.42 CATARACT EXTRACTION STATUS, LEFT EYE: Chronic | ICD-10-CM

## 2022-02-09 DIAGNOSIS — Z98.49 CATARACT EXTRACTION STATUS, UNSPECIFIED EYE: Chronic | ICD-10-CM

## 2022-02-09 DIAGNOSIS — Z01.818 ENCOUNTER FOR OTHER PREPROCEDURAL EXAMINATION: ICD-10-CM

## 2022-02-09 DIAGNOSIS — Z98.89 OTHER SPECIFIED POSTPROCEDURAL STATES: Chronic | ICD-10-CM

## 2022-02-09 LAB
A1C WITH ESTIMATED AVERAGE GLUCOSE RESULT: 6.1 % — HIGH (ref 4–5.6)
ANION GAP SERPL CALC-SCNC: 12 MMOL/L — SIGNIFICANT CHANGE UP (ref 5–17)
BUN SERPL-MCNC: 14 MG/DL — SIGNIFICANT CHANGE UP (ref 7–23)
CALCIUM SERPL-MCNC: 10.4 MG/DL — SIGNIFICANT CHANGE UP (ref 8.4–10.5)
CHLORIDE SERPL-SCNC: 101 MMOL/L — SIGNIFICANT CHANGE UP (ref 96–108)
CO2 SERPL-SCNC: 27 MMOL/L — SIGNIFICANT CHANGE UP (ref 22–31)
CREAT SERPL-MCNC: 0.79 MG/DL — SIGNIFICANT CHANGE UP (ref 0.5–1.3)
ESTIMATED AVERAGE GLUCOSE: 128 MG/DL — HIGH (ref 68–114)
GLUCOSE SERPL-MCNC: 91 MG/DL — SIGNIFICANT CHANGE UP (ref 70–99)
HCT VFR BLD CALC: 45.3 % — HIGH (ref 34.5–45)
HGB BLD-MCNC: 14 G/DL — SIGNIFICANT CHANGE UP (ref 11.5–15.5)
MCHC RBC-ENTMCNC: 27.6 PG — SIGNIFICANT CHANGE UP (ref 27–34)
MCHC RBC-ENTMCNC: 30.9 GM/DL — LOW (ref 32–36)
MCV RBC AUTO: 89.2 FL — SIGNIFICANT CHANGE UP (ref 80–100)
NRBC # BLD: 0 /100 WBCS — SIGNIFICANT CHANGE UP (ref 0–0)
PLATELET # BLD AUTO: 291 K/UL — SIGNIFICANT CHANGE UP (ref 150–400)
POTASSIUM SERPL-MCNC: 4.3 MMOL/L — SIGNIFICANT CHANGE UP (ref 3.5–5.3)
POTASSIUM SERPL-SCNC: 4.3 MMOL/L — SIGNIFICANT CHANGE UP (ref 3.5–5.3)
RBC # BLD: 5.08 M/UL — SIGNIFICANT CHANGE UP (ref 3.8–5.2)
RBC # FLD: 13.7 % — SIGNIFICANT CHANGE UP (ref 10.3–14.5)
SODIUM SERPL-SCNC: 140 MMOL/L — SIGNIFICANT CHANGE UP (ref 135–145)
WBC # BLD: 8.62 K/UL — SIGNIFICANT CHANGE UP (ref 3.8–10.5)
WBC # FLD AUTO: 8.62 K/UL — SIGNIFICANT CHANGE UP (ref 3.8–10.5)

## 2022-02-09 PROCEDURE — 85027 COMPLETE CBC AUTOMATED: CPT

## 2022-02-09 PROCEDURE — 80048 BASIC METABOLIC PNL TOTAL CA: CPT

## 2022-02-09 PROCEDURE — G0463: CPT

## 2022-02-09 PROCEDURE — 87086 URINE CULTURE/COLONY COUNT: CPT

## 2022-02-09 PROCEDURE — 83036 HEMOGLOBIN GLYCOSYLATED A1C: CPT

## 2022-02-09 RX ORDER — PHENTERMINE HCL 30 MG
50 CAPSULE ORAL
Qty: 0 | Refills: 0 | DISCHARGE

## 2022-02-09 RX ORDER — CEFAZOLIN SODIUM 1 G
2000 VIAL (EA) INJECTION ONCE
Refills: 0 | Status: DISCONTINUED | OUTPATIENT
Start: 2022-02-23 | End: 2022-03-09

## 2022-02-09 RX ORDER — TROSPIUM CHLORIDE 20 MG/1
0 TABLET, FILM COATED ORAL
Qty: 0 | Refills: 0 | DISCHARGE

## 2022-02-09 NOTE — H&P PST ADULT - GENERAL GENITOURINARY SYMPTOMS
intermittent hematuria, intermittent right flank pain, tea-colored urine at times, OAB s/p interstim placement and on trospium/increased urinary frequency

## 2022-02-09 NOTE — H&P PST ADULT - NSICDXPASTSURGICALHX_GEN_ALL_CORE_FT
PAST SURGICAL HISTORY:  History of Cholecystectomy- 2011/April 2011; Laparoscopic    History of kidney stones s/p percutaneous stone extraction in 2011    History of tonsillectomy age 20    Lap-Band Surgery - 2006 Removal of Lap band- 03/2017    OAB (overactive bladder) s/p Interstim insertion (04/2016)  revision 2017, Replacement interstim implant 8/2018    S/P cataract surgery B/L; 2018 - with IOL Implant    S/P D&C (status post dilation and curettage) 2010 and 2014    S/P hip replacement, left 4/2019    S/P hip replacement, right 12/2018    S/P hysterectomy 2014; Total abdominal (secondary to fibroids and precancerous cells)

## 2022-02-09 NOTE — H&P PST ADULT - RS GEN HX ROS MEA POS PC
Possibly 2018 routinely with cardiologist - As per patient, results were normal GARCIA when walking > 10 steps or climbing 1 flight of stairs; Denies chest pain, syncope, palpitations or claudication. Sleeps with 2 pillows at night. Pending cardiac eval. on 2/10/22

## 2022-02-09 NOTE — H&P PST ADULT - ACTIVITY
Able to walk within home with walker, wheelchair for distances outside home, ADLs, 1 Flight of stairs

## 2022-02-09 NOTE — H&P PST ADULT - FALL HARM RISK - RISK INTERVENTIONS

## 2022-02-09 NOTE — H&P PST ADULT - SYMPTOMS
GARCIA when walking > 10 steps or climbing 1 flight of stairs; Denies chest pain, syncope, palpitations or claudication. Sleeps with 2 pillows at night. Pending cardiac eval. on 2/10/22

## 2022-02-09 NOTE — H&P PST ADULT - HISTORY OF PRESENT ILLNESS
67 year old female with PMH Morbid Obesity (BMI 54.7), Former Smoker (1 PPD x 11 years; Quit in 1990), Lap. Band (2006) s/p Removal (2017), OA s/p B/L THR, Prediabetes, HTN, GERD, Lap. Cholecystectomy (2011), Total Abd. Hysterectomy 2/2 Fibroids (2014), OAB s/p Interstim Placement (2016), IBS and Diverticulitis reports c/o tea-colored urine x 1 month with intermittent right flank pain. Pt f/u with urologist who performed litholink testing Q 6 months due to history of kidney stones. Litholink from 1/13/21 showed low urine output, low citrate, SS CaOz and SS UA. CT A/P from 7/21/21 revealed multiple stones in right kidney measuring up to 7 mm and a 6 mm upper pole right renal calculus. Pt now presents to PST for scheduled Cystoscopy, Right ureteroscopy, Laser Lithotripsy and stent placement with Dr. Murrieta on 2/23/22.    Received the Pfizer Covid vaccine 2nd dose 5/17/2021; Pending Booster  Covid PCR test scheduled for 2/20/22  Denies Recent travel, Exposure or Covid symptoms   No recent hospitalizations over the past 3 months

## 2022-02-09 NOTE — H&P PST ADULT - NSICDXPASTMEDICALHX_GEN_ALL_CORE_FT
PAST MEDICAL HISTORY:  Calculus of kidney     Diverticulitis on Budesonide; Last flare-up 6 months ago    Essential hypertension     Former smoker 1 PPD x 11 years; Quit in 1990    GERD (Gastroesophageal Reflux Disease)     Gout on Allopurinol    Irritable bowel syndrome, unspecified type     Morbid Obesity BMI 54.7    Osteoarthritis B/L Knee OA, B/L hip replacements    Overactive Bladder on Trospium    Pancreatitis Chronic stable since 2008 - resolved since cholecystectomy in 2011    Prediabetes

## 2022-02-09 NOTE — H&P PST ADULT - CARDIOVASCULAR SYMPTOMS
Possibly 2018 routinely with cardiologist - As per patient, results were normal/dyspnea on exertion GARCIA when walking > 10 steps or climbing 1 flight of stairs; Denies chest pain, syncope, palpitations or claudication. Sleeps with 2 pillows at night. Pending cardiac eval. on 2/10/22/dyspnea on exertion

## 2022-02-09 NOTE — H&P PST ADULT - PROBLEM SELECTOR PLAN 2
BMI 54.7; OR Booking notified  Advised pt to hold weight loss medication 7 days prior to procedure; Last dose of Phentermine on 2/16/22

## 2022-02-09 NOTE — H&P PST ADULT - PROBLEM SELECTOR PLAN 1
Pt is scheduled for a cystoscopy, right ureteroscopy, laser lithotripsy and stent placement with Dr. Murrieta on 2/23/22  Covid PCR test scheduled for 2/20/22 at [facility]  CBC, BMP, HGA1C, and urine culture ordered and obtained  FS on admit due to hx of prediabetes  Pending medical and cardiac evaluations  Most recent echo, stress and ekg requested from cardiologist

## 2022-02-10 LAB
CULTURE RESULTS: SIGNIFICANT CHANGE UP
SPECIMEN SOURCE: SIGNIFICANT CHANGE UP

## 2022-02-14 PROBLEM — N20.0 CALCULUS OF KIDNEY: Chronic | Status: ACTIVE | Noted: 2022-02-09

## 2022-02-14 PROBLEM — Z87.891 PERSONAL HISTORY OF NICOTINE DEPENDENCE: Chronic | Status: ACTIVE | Noted: 2022-02-09

## 2022-02-14 PROBLEM — R73.03 PREDIABETES: Chronic | Status: ACTIVE | Noted: 2022-02-09

## 2022-02-15 ENCOUNTER — TRANSCRIPTION ENCOUNTER (OUTPATIENT)
Age: 67
End: 2022-02-15

## 2022-02-20 ENCOUNTER — OUTPATIENT (OUTPATIENT)
Dept: OUTPATIENT SERVICES | Facility: HOSPITAL | Age: 67
LOS: 1 days | End: 2022-02-20
Payer: COMMERCIAL

## 2022-02-20 DIAGNOSIS — Z96.642 PRESENCE OF LEFT ARTIFICIAL HIP JOINT: Chronic | ICD-10-CM

## 2022-02-20 DIAGNOSIS — Z11.52 ENCOUNTER FOR SCREENING FOR COVID-19: ICD-10-CM

## 2022-02-20 DIAGNOSIS — Z90.710 ACQUIRED ABSENCE OF BOTH CERVIX AND UTERUS: Chronic | ICD-10-CM

## 2022-02-20 DIAGNOSIS — Z96.641 PRESENCE OF RIGHT ARTIFICIAL HIP JOINT: Chronic | ICD-10-CM

## 2022-02-20 DIAGNOSIS — N32.81 OVERACTIVE BLADDER: Chronic | ICD-10-CM

## 2022-02-20 DIAGNOSIS — Z87.442 PERSONAL HISTORY OF URINARY CALCULI: Chronic | ICD-10-CM

## 2022-02-20 DIAGNOSIS — Z90.89 ACQUIRED ABSENCE OF OTHER ORGANS: Chronic | ICD-10-CM

## 2022-02-20 DIAGNOSIS — Z98.89 OTHER SPECIFIED POSTPROCEDURAL STATES: Chronic | ICD-10-CM

## 2022-02-20 DIAGNOSIS — Z98.49 CATARACT EXTRACTION STATUS, UNSPECIFIED EYE: Chronic | ICD-10-CM

## 2022-02-20 LAB — SARS-COV-2 RNA SPEC QL NAA+PROBE: DETECTED

## 2022-02-22 ENCOUNTER — TRANSCRIPTION ENCOUNTER (OUTPATIENT)
Age: 67
End: 2022-02-22

## 2022-02-23 ENCOUNTER — TRANSCRIPTION ENCOUNTER (OUTPATIENT)
Age: 67
End: 2022-02-23

## 2022-02-23 ENCOUNTER — RESULT REVIEW (OUTPATIENT)
Age: 67
End: 2022-02-23

## 2022-02-23 ENCOUNTER — APPOINTMENT (OUTPATIENT)
Dept: UROLOGY | Facility: HOSPITAL | Age: 67
End: 2022-02-23

## 2022-02-23 ENCOUNTER — OUTPATIENT (OUTPATIENT)
Dept: INPATIENT UNIT | Facility: HOSPITAL | Age: 67
LOS: 1 days | End: 2022-02-23
Payer: COMMERCIAL

## 2022-02-23 VITALS
WEIGHT: 293 LBS | TEMPERATURE: 98 F | HEIGHT: 62 IN | RESPIRATION RATE: 16 BRPM | HEART RATE: 77 BPM | DIASTOLIC BLOOD PRESSURE: 99 MMHG | OXYGEN SATURATION: 99 % | SYSTOLIC BLOOD PRESSURE: 152 MMHG

## 2022-02-23 VITALS
DIASTOLIC BLOOD PRESSURE: 90 MMHG | SYSTOLIC BLOOD PRESSURE: 150 MMHG | OXYGEN SATURATION: 95 % | RESPIRATION RATE: 16 BRPM | HEART RATE: 68 BPM

## 2022-02-23 DIAGNOSIS — Z98.89 OTHER SPECIFIED POSTPROCEDURAL STATES: Chronic | ICD-10-CM

## 2022-02-23 DIAGNOSIS — Z90.89 ACQUIRED ABSENCE OF OTHER ORGANS: Chronic | ICD-10-CM

## 2022-02-23 DIAGNOSIS — Z98.49 CATARACT EXTRACTION STATUS, UNSPECIFIED EYE: Chronic | ICD-10-CM

## 2022-02-23 DIAGNOSIS — N32.81 OVERACTIVE BLADDER: Chronic | ICD-10-CM

## 2022-02-23 DIAGNOSIS — Z90.710 ACQUIRED ABSENCE OF BOTH CERVIX AND UTERUS: Chronic | ICD-10-CM

## 2022-02-23 DIAGNOSIS — N20.0 CALCULUS OF KIDNEY: ICD-10-CM

## 2022-02-23 DIAGNOSIS — Z96.642 PRESENCE OF LEFT ARTIFICIAL HIP JOINT: Chronic | ICD-10-CM

## 2022-02-23 DIAGNOSIS — Z96.641 PRESENCE OF RIGHT ARTIFICIAL HIP JOINT: Chronic | ICD-10-CM

## 2022-02-23 DIAGNOSIS — Z87.442 PERSONAL HISTORY OF URINARY CALCULI: Chronic | ICD-10-CM

## 2022-02-23 LAB — GLUCOSE BLDC GLUCOMTR-MCNC: 97 MG/DL — SIGNIFICANT CHANGE UP (ref 70–99)

## 2022-02-23 PROCEDURE — 74420 UROGRAPHY RTRGR +-KUB: CPT | Mod: 26

## 2022-02-23 PROCEDURE — 76000 FLUOROSCOPY <1 HR PHYS/QHP: CPT

## 2022-02-23 PROCEDURE — U0005: CPT

## 2022-02-23 PROCEDURE — U0003: CPT

## 2022-02-23 PROCEDURE — 52356 CYSTO/URETERO W/LITHOTRIPSY: CPT | Mod: RT

## 2022-02-23 PROCEDURE — C9803: CPT

## 2022-02-23 PROCEDURE — 88300 SURGICAL PATH GROSS: CPT | Mod: 26

## 2022-02-23 DEVICE — GUIDEWIRE SENSOR DUAL-FLEX NITINOL STRAIGHT .035" X 150CM: Type: IMPLANTABLE DEVICE | Status: FUNCTIONAL

## 2022-02-23 DEVICE — URETERAL SHEATH NAVIGATOR HD 11/13FR X 36CM: Type: IMPLANTABLE DEVICE | Status: FUNCTIONAL

## 2022-02-23 DEVICE — STONE BASKET ZEROTIP NITINOL 4-WIRE 1.9FR 120CM X 12MM: Type: IMPLANTABLE DEVICE | Status: FUNCTIONAL

## 2022-02-23 DEVICE — STENT URET 7FR 24CM: Type: IMPLANTABLE DEVICE | Status: FUNCTIONAL

## 2022-02-23 DEVICE — LASER FIBER SOLTIVE 200 BALL TIP: Type: IMPLANTABLE DEVICE | Status: FUNCTIONAL

## 2022-02-23 DEVICE — URETERAL CATH OPEN END 5FR 70CM: Type: IMPLANTABLE DEVICE | Status: FUNCTIONAL

## 2022-02-23 RX ORDER — TAMSULOSIN HYDROCHLORIDE 0.4 MG/1
1 CAPSULE ORAL
Qty: 7 | Refills: 0
Start: 2022-02-23 | End: 2022-03-01

## 2022-02-23 RX ORDER — OXYCODONE HYDROCHLORIDE 5 MG/1
10 TABLET ORAL ONCE
Refills: 0 | Status: DISCONTINUED | OUTPATIENT
Start: 2022-02-23 | End: 2022-02-23

## 2022-02-23 RX ORDER — SODIUM CHLORIDE 9 MG/ML
3 INJECTION INTRAMUSCULAR; INTRAVENOUS; SUBCUTANEOUS EVERY 8 HOURS
Refills: 0 | Status: DISCONTINUED | OUTPATIENT
Start: 2022-02-23 | End: 2022-02-23

## 2022-02-23 RX ORDER — ONDANSETRON 8 MG/1
4 TABLET, FILM COATED ORAL EVERY 4 HOURS
Refills: 0 | Status: DISCONTINUED | OUTPATIENT
Start: 2022-02-23 | End: 2022-02-23

## 2022-02-23 RX ORDER — LIDOCAINE HCL 20 MG/ML
0.2 VIAL (ML) INJECTION ONCE
Refills: 0 | Status: DISCONTINUED | OUTPATIENT
Start: 2022-02-23 | End: 2022-02-23

## 2022-02-23 RX ORDER — OXYCODONE HYDROCHLORIDE 5 MG/1
5 TABLET ORAL ONCE
Refills: 0 | Status: DISCONTINUED | OUTPATIENT
Start: 2022-02-23 | End: 2022-02-23

## 2022-02-23 RX ADMIN — ONDANSETRON 4 MILLIGRAM(S): 8 TABLET, FILM COATED ORAL at 15:50

## 2022-02-23 RX ADMIN — OXYCODONE HYDROCHLORIDE 10 MILLIGRAM(S): 5 TABLET ORAL at 16:45

## 2022-02-23 RX ADMIN — OXYCODONE HYDROCHLORIDE 10 MILLIGRAM(S): 5 TABLET ORAL at 16:15

## 2022-02-23 NOTE — DISCHARGE NOTE NURSING/CASE MANAGEMENT/SOCIAL WORK - PATIENT PORTAL LINK FT
You can access the FollowMyHealth Patient Portal offered by  by registering at the following website: [email or web address]. By joining [employer]’s FollowMyHealth portal, you will also be able to view your health information using other applications (apps) compatible with our system.

## 2022-02-23 NOTE — PRE-ANESTHESIA EVALUATION ADULT - NSANTHPMHFT_GEN_ALL_CORE
67 F w/ morbid Obesity (BMI 54.7), Former Smoker (1 PPD x 11 years; Quit in 1990), Lap. Band (2006) s/p Removal (2017), OA s/p B/L THR, Prediabetes, HTN, GERD, Lap. Cholecystectomy (2011), Total Abd. Hysterectomy 2/2 Fibroids (2014), OAB s/p Interstim Placement (2016), IBS and Diverticulitis

## 2022-02-23 NOTE — ASU DISCHARGE PLAN (ADULT/PEDIATRIC) - CARE PROVIDER_API CALL
Ector Murrieta)  Urology  [facility] - Dept of Urology, [address] [place] [address]  Phone: [phone]  Fax: [phone]  Follow Up Time: 1 week

## 2022-02-23 NOTE — DISCHARGE NOTE NURSING/CASE MANAGEMENT/SOCIAL WORK - NSDCPEFALRISK_GEN_ALL_CORE
For information on Fall & Injury Prevention, visit: [email or web address] OR  [email or web address] OR  https://www.cdc.gov/steadi/patient.html

## 2022-02-23 NOTE — ASU DISCHARGE PLAN (ADULT/PEDIATRIC) - NS MD DC FALL RISK RISK
For information on Fall & Injury Prevention, visit: [email or web address] OR  [email or web address] OR  https://www.cdc.gov/steadi/patient.html

## 2022-02-23 NOTE — ASU DISCHARGE PLAN (ADULT/PEDIATRIC) - ASU DC SPECIAL INSTRUCTIONSFT
STENT: You may have an internal stent (a hollow tube that runs from the kidney to your bladder) after your procedure, which helps urine drain from the kidney to your bladder. Some patients experience urinary frequency, burning, or even back pain (especially with urination). These sensations will gradually get better. Increasing your fluid intake can also improve these symptoms. While the stent is in place, your urine may continue to be bloody.   GENERAL: It is common to have blood in your urine after your procedure. It may be pink or even red; inform your doctor if you have a significant amount of clot in the urine or if you are unable to void at all. The urine may clear and then become bloody again especially as you are more physically active.  BATHING: You may shower or bathe.  DIET: You may resume your regular diet and regular medication regimen.  PAIN: You may take Tylenol (acetaminophen) 650-975mg and/or Motrin (ibuprofen) 400-600mg, both available over the counter, for pain every 6 hours as needed. Do not exceed 4000mg of Tylenol (acetaminophen) daily. You may alternate these medications such that you take one or the other every 3 hours for around the clock pain coverage. If you have a stent, the following medications may have been sent to your pharmacy for stent related discomfort: Flomax (tamsulosin) 0.4mg at bedtime until stent removed  ANTIBIOTICS: You may be given a prescription for an antibiotic, please take this medication as instructed and be sure to complete the entire course.  STOOL SOFTENERS: Do not allow yourself to become constipated as straining may cause bleeding. Take stool softeners or a laxative (ex. Miralax, Colace, Senokot, ExLax, etc), available over the counter, if needed.  ACTIVITY: No heavy lifting or strenuous exercise until you are evaluated at your post-operative appointment. Otherwise, you may return to your usual level of physical activity.  ANTICOAGULATION: If you are taking any blood thinning medications, please discuss with your urologist prior to restarting these medications unless otherwise specified.  FOLLOW-UP: If you did not already schedule your post-operative appointment, please call your urologist to schedule and follow-up appointment.  CALL YOUR UROLOGIST IF: You have any bleeding that does not stop, inability to void >8 hours, fever over 100.4 F, chills, persistent nausea/vomiting, changes in your incision concerning for infection, or if your pain is not controlled on your discharge pain medications.

## 2022-02-23 NOTE — ASU DISCHARGE PLAN (ADULT/PEDIATRIC) - C. MAKE IMPORTANT PERSONAL OR BUSINESS DECISIONS
DATE: 05/11/2017

 

DATE:  05/11/2017.

 

DIAGNOSES:

1. Gastrointestinal bleeding.

2. Anemia/thrombocytopenia.

3. Chronic renal insufficiency.

 

HISTORY OF PRESENT ILLNESS:  Mr. Hummel is a pleasant 81-year-old gentleman

admitted on 05/07/2017 with ongoing gastrointestinal bleeding.  He is

currently being seen by GI who   suggests pursuing camera endoscopy as

outpatient.  He continues on observational status as he has continued to have

melanotic  stools throughout his hospital stay.  I did recommend and  ordered

 single donor platelet pheresis to be administered in the hopes of

stabilizing his hemoglobin.  Mr. Hummel  is sitting at bedside.  Clinically,

seems to be doing well and hopefully  is approaching discharge.  His platelet

count did arise markedly now at 81,000.

 

PHYSICAL EXAMINATION:

GENERAL:  He is in no acute distress.

VITAL SIGNS:  Temperature 37.0, pulse 55, respirations 18, blood pressure

139/63.

SKIN:  Without rash or lesion.

HEAD, EYES, EARS, NOSE, AND THROAT:  Oral mucosa without erythema or

ulceration.

HEART:  Regular rate and rhythm.

LUNGS:  Clear to auscultation bilaterally.

ABDOMEN:  Soft, nontender, nondistended.

EXTREMITIES:  No clubbing, cyanosis or edema.

NEUROLOGIC EXAMINATION:  Grossly intact.

 

LABORATORY DATA:  WBC count 5580, hemoglobin 10.2, platelet count 81,000. 

Chemistries 141, potassium 4.2, chloride 109, carbon dioxide 23, creatinine

2.1 and  BUN  30.

 

IMPRESSION:

1. Gastrointestinal bleeding, source  unclear.

2. Normocytic normochromic anemia most likely secondary to renal

insufficiency/chronic disease.

3. Thrombocytopenia,  etiology unknown, suspect immune thrombocytopenia.

 

PLAN:  I had the pleasure of seeing Chase at bedside today.  He seems to

be feeling much better and wants to go home.  From hematology standpoint, he

tolerated platelet transfusion well with nice bump in his counts.  I would

prefer to work him up further as outpatient.  If he is to be discharged in

the next 24-48 hours  make sure that order is  placed for followup within the

next week or two post hospitalization.  I believe camera endoscopy is the way

to go to pursue diagnosis, specifically to evaluate the small bowel.

 

Thank you very much for allowing me to participate in his care.  I will

continue to periodically follow him during his hospital stay. Statement Selected

## 2022-02-23 NOTE — BRIEF OPERATIVE NOTE - NSICDXBRIEFPROCEDURE_GEN_ALL_CORE_FT
PROCEDURES:  Cystoscopy with right retrograde pyelography with ureteroscopy with insertion of ureteral stent 23-Feb-2022 14:57:46  Nely Kate

## 2022-02-23 NOTE — ASU PATIENT PROFILE, ADULT - FALL HARM RISK - HARM RISK INTERVENTIONS

## 2022-02-24 PROCEDURE — 88300 SURGICAL PATH GROSS: CPT

## 2022-02-24 PROCEDURE — C2625: CPT

## 2022-02-24 PROCEDURE — C9399: CPT

## 2022-02-24 PROCEDURE — C1889: CPT

## 2022-02-24 PROCEDURE — 82365 CALCULUS SPECTROSCOPY: CPT

## 2022-02-24 PROCEDURE — 82962 GLUCOSE BLOOD TEST: CPT

## 2022-02-24 PROCEDURE — 52332 CYSTOSCOPY AND TREATMENT: CPT

## 2022-02-24 PROCEDURE — 52352 CYSTOURETERO W/STONE REMOVE: CPT

## 2022-02-24 PROCEDURE — C1769: CPT

## 2022-02-24 PROCEDURE — C1758: CPT

## 2022-02-25 ENCOUNTER — NON-APPOINTMENT (OUTPATIENT)
Age: 67
End: 2022-02-25

## 2022-02-25 RX ORDER — KETOROLAC TROMETHAMINE 10 MG/1
10 TABLET, FILM COATED ORAL 3 TIMES DAILY
Qty: 15 | Refills: 0 | Status: ACTIVE | COMMUNITY
Start: 2022-02-25 | End: 1900-01-01

## 2022-03-01 ENCOUNTER — OUTPATIENT (OUTPATIENT)
Dept: OUTPATIENT SERVICES | Facility: HOSPITAL | Age: 67
LOS: 1 days | End: 2022-03-01
Payer: COMMERCIAL

## 2022-03-01 ENCOUNTER — APPOINTMENT (OUTPATIENT)
Dept: UROLOGY | Facility: CLINIC | Age: 67
End: 2022-03-01

## 2022-03-01 ENCOUNTER — APPOINTMENT (OUTPATIENT)
Dept: UROLOGY | Facility: CLINIC | Age: 67
End: 2022-03-01
Payer: MEDICARE

## 2022-03-01 VITALS — SYSTOLIC BLOOD PRESSURE: 194 MMHG | DIASTOLIC BLOOD PRESSURE: 96 MMHG | HEART RATE: 106 BPM

## 2022-03-01 DIAGNOSIS — R35.0 FREQUENCY OF MICTURITION: ICD-10-CM

## 2022-03-01 DIAGNOSIS — Z87.442 PERSONAL HISTORY OF URINARY CALCULI: Chronic | ICD-10-CM

## 2022-03-01 DIAGNOSIS — Z98.49 CATARACT EXTRACTION STATUS, UNSPECIFIED EYE: Chronic | ICD-10-CM

## 2022-03-01 DIAGNOSIS — Z96.641 PRESENCE OF RIGHT ARTIFICIAL HIP JOINT: Chronic | ICD-10-CM

## 2022-03-01 DIAGNOSIS — Z90.710 ACQUIRED ABSENCE OF BOTH CERVIX AND UTERUS: Chronic | ICD-10-CM

## 2022-03-01 DIAGNOSIS — Z98.89 OTHER SPECIFIED POSTPROCEDURAL STATES: Chronic | ICD-10-CM

## 2022-03-01 DIAGNOSIS — Z96.642 PRESENCE OF LEFT ARTIFICIAL HIP JOINT: Chronic | ICD-10-CM

## 2022-03-01 DIAGNOSIS — Z90.89 ACQUIRED ABSENCE OF OTHER ORGANS: Chronic | ICD-10-CM

## 2022-03-01 DIAGNOSIS — N32.81 OVERACTIVE BLADDER: Chronic | ICD-10-CM

## 2022-03-01 LAB — NIDUS STONE QN: SIGNIFICANT CHANGE UP

## 2022-03-01 PROCEDURE — 52310 CYSTOSCOPY AND TREATMENT: CPT

## 2022-03-02 ENCOUNTER — RX RENEWAL (OUTPATIENT)
Age: 67
End: 2022-03-02

## 2022-03-03 DIAGNOSIS — N20.0 CALCULUS OF KIDNEY: ICD-10-CM

## 2022-03-06 LAB — SURGICAL PATHOLOGY STUDY: SIGNIFICANT CHANGE UP

## 2022-03-09 ENCOUNTER — APPOINTMENT (OUTPATIENT)
Dept: BARIATRICS/WEIGHT MGMT | Facility: CLINIC | Age: 67
End: 2022-03-09
Payer: MEDICARE

## 2022-03-09 VITALS — BODY MASS INDEX: 53.92 KG/M2 | HEIGHT: 62 IN | WEIGHT: 293 LBS

## 2022-03-09 DIAGNOSIS — E78.5 HYPERLIPIDEMIA, UNSPECIFIED: ICD-10-CM

## 2022-03-09 PROCEDURE — 99214 OFFICE O/P EST MOD 30 MIN: CPT | Mod: 95

## 2022-03-09 RX ORDER — TOPIRAMATE 50 MG/1
50 TABLET, FILM COATED ORAL
Qty: 90 | Refills: 0 | Status: DISCONTINUED | COMMUNITY
Start: 2019-08-19 | End: 2022-03-09

## 2022-03-09 RX ORDER — SEMAGLUTIDE 1.34 MG/ML
2 INJECTION, SOLUTION SUBCUTANEOUS
Qty: 1 | Refills: 1 | Status: DISCONTINUED | COMMUNITY
Start: 2019-10-21 | End: 2022-03-09

## 2022-03-09 RX ORDER — BUDESONIDE 3 MG/1
3 CAPSULE, COATED PELLETS ORAL DAILY
Refills: 0 | Status: ACTIVE | COMMUNITY
Start: 2022-03-09

## 2022-03-09 NOTE — ASSESSMENT
[FreeTextEntry1] : 67F PMH obesity (s/p lap-band placement and removal March 2017), HTN, DM2, HLD, nephrolithiasis and overactive bladder presents for weight management f/u.\par \par # Obesity (s/p lap-band placement and removal March 2017) c/b DM2, HTN, HLD, nephrolithiasis: Weight today 298 lbs, BMI 54.51, gained about 20 lbs since last seen about 9 months ago in the setting of cessation of her 3 medications, multiple medical issues (Crohn's dx, Covid-19 infection, kidney stone removal), dietary habits including snacking on candy and replacement of fruits/vegetables w/simple starches due to Colitis. Sedentary. \par - Food log may be beneficial not just for weight but for correlating with symptoms while establishing tolerance of various whole foods as she recovers from Colitis. \par - F/u with dietician\par - Will restart Phentermine. Start 1/2 tab of 37.5 mg for the first 3-5 days, will continue this dose if she is having significant benefit to appetite/dietary habits. If she is having minimal effect, and no noted side effects, may increase to 37.5 mg/d.\par - Will consider restarting Ozempic at next or future visit. Given recent colitis and propensity of medication for GI side effects, opted to start phentermine first and assess at further visits.\par - Will withhold Topiramate at this time, given recent kidney stones.\par - Minimize candy at snacks, better to replace with fruit or other tolerated healthy alternative. Patient may consider fasting with just water during these periods (limiting intake to 1-2 meals), which may be easier with addition of Phentermine.\par - Maintain good hydration\par - Follow-up in 3-4 weeks.

## 2022-03-09 NOTE — HISTORY OF PRESENT ILLNESS
[Home] : at home, [unfilled] , at the time of the visit. [Medical Office: ([facility])___] : at the medical office located in  [Verbal consent obtained from patient] : the patient, [unfilled] [FreeTextEntry1] : 67F PMH obesity (s/p lap-band placement and removal March 2017), HTN, DM2, HLD, nephrolithiasis and overactive bladder presents for weight management f/u.\par \par Weight today: 298 lbs, BMI 54.51\par Weight at last visit (6/27/21): 279 lbs 2 oz, BMI 51.05\par \par Had kidney stone removal 2 weeks ago, recovering.\par She had colitis (?Crohn's) with diarrhea from May to September, later got COVID-19, then the kidney stones.\par \par Medication: She has been off of all of her weight medications since last prescribed. She was last on Phentermine 30 mg, Topiramate 50 mg, and Ozempic 0.5 mg. She is on budesonide 3 mg x 3 tab/d for her colitis for 2 months and just when flare.\par \par Diet: Since her diagnosis of colitis she has been eating lots of rice and cut down on vegetables and fruits. Before she had cut out starches except for sweet potatoes. Usually eating one meal per day, but she snacks on candy and dried apples.

## 2022-03-09 NOTE — REVIEW OF SYSTEMS
[Negative] : Allergic/Immunologic [MED-ROS-Gastro-FT] : colitis [MED-ROS-Genituro-FT] : recent kidney stone removal

## 2022-03-09 NOTE — PHYSICAL EXAM
[Obese, well nourished, in no acute distress] : obese, well nourished, in no acute distress [de-identified] : TEB appointment

## 2022-04-07 ENCOUNTER — APPOINTMENT (OUTPATIENT)
Dept: UROLOGY | Facility: CLINIC | Age: 67
End: 2022-04-07
Payer: MEDICARE

## 2022-04-07 DIAGNOSIS — N39.46 MIXED INCONTINENCE: ICD-10-CM

## 2022-04-07 PROCEDURE — 99213 OFFICE O/P EST LOW 20 MIN: CPT

## 2022-04-07 NOTE — ASSESSMENT
[FreeTextEntry1] : She seems to be more bothered by GABRIEL\par \par will have [employer] rep reach out to pt for possible reprograaming\par \par she is unsure about intervention for GABRIEL\par \par \par will obtain UDS and cysto - consent obtained

## 2022-04-07 NOTE — HISTORY OF PRESENT ILLNESS
[FreeTextEntry1] : 67 year old F w hx of OAB wet s/p 2016 SNM placement with 2018 revision as well as GABRIEL.   Recent nephrolithiasis treated by Dr Murrieta.  \par \par DTF 2 hours\par Nocturia 1-2\par She reports using 5 PPD\par GABRIEL and UUI\par \par former smoker\par \par UDS  (2016)\par capacity 691\par no DO\par GABRIEL  VLPP 188\par flow 22\par pdet 35\par pvr 100\par \par \par

## 2022-04-07 NOTE — PHYSICAL EXAM
[General Appearance - Well Developed] : well developed [General Appearance - Well Nourished] : well nourished [Normal Appearance] : normal appearance [Well Groomed] : well groomed [General Appearance - In No Acute Distress] : no acute distress [Abdomen Tenderness] : non-tender [Costovertebral Angle Tenderness] : no ~M costovertebral angle tenderness [] : no respiratory distress [Respiration, Rhythm And Depth] : normal respiratory rhythm and effort [Exaggerated Use Of Accessory Muscles For Inspiration] : no accessory muscle use [Affect] : the affect was normal [Mood] : the mood was normal [Not Anxious] : not anxious [Normal Station and Gait] : the gait and station were normal for the patient's age

## 2022-04-12 ENCOUNTER — OUTPATIENT (OUTPATIENT)
Dept: OUTPATIENT SERVICES | Facility: HOSPITAL | Age: 67
LOS: 1 days | End: 2022-04-12
Payer: COMMERCIAL

## 2022-04-12 ENCOUNTER — APPOINTMENT (OUTPATIENT)
Dept: UROLOGY | Facility: CLINIC | Age: 67
End: 2022-04-12
Payer: MEDICARE

## 2022-04-12 VITALS — SYSTOLIC BLOOD PRESSURE: 151 MMHG | HEART RATE: 96 BPM | DIASTOLIC BLOOD PRESSURE: 91 MMHG

## 2022-04-12 DIAGNOSIS — Z90.89 ACQUIRED ABSENCE OF OTHER ORGANS: Chronic | ICD-10-CM

## 2022-04-12 DIAGNOSIS — N32.81 OVERACTIVE BLADDER: Chronic | ICD-10-CM

## 2022-04-12 DIAGNOSIS — Z96.642 PRESENCE OF LEFT ARTIFICIAL HIP JOINT: Chronic | ICD-10-CM

## 2022-04-12 DIAGNOSIS — Z98.89 OTHER SPECIFIED POSTPROCEDURAL STATES: Chronic | ICD-10-CM

## 2022-04-12 DIAGNOSIS — Z98.49 CATARACT EXTRACTION STATUS, UNSPECIFIED EYE: Chronic | ICD-10-CM

## 2022-04-12 DIAGNOSIS — Z96.641 PRESENCE OF RIGHT ARTIFICIAL HIP JOINT: Chronic | ICD-10-CM

## 2022-04-12 DIAGNOSIS — Z90.710 ACQUIRED ABSENCE OF BOTH CERVIX AND UTERUS: Chronic | ICD-10-CM

## 2022-04-12 DIAGNOSIS — Z87.442 PERSONAL HISTORY OF URINARY CALCULI: Chronic | ICD-10-CM

## 2022-04-12 PROCEDURE — 76775 US EXAM ABDO BACK WALL LIM: CPT

## 2022-04-12 PROCEDURE — 76775 US EXAM ABDO BACK WALL LIM: CPT | Mod: 26

## 2022-04-12 PROCEDURE — 99212 OFFICE O/P EST SF 10 MIN: CPT

## 2022-04-13 DIAGNOSIS — N20.0 CALCULUS OF KIDNEY: ICD-10-CM

## 2022-04-14 ENCOUNTER — NON-APPOINTMENT (OUTPATIENT)
Age: 67
End: 2022-04-14

## 2022-04-24 ENCOUNTER — APPOINTMENT (OUTPATIENT)
Dept: CT IMAGING | Facility: IMAGING CENTER | Age: 67
End: 2022-04-24
Payer: MEDICARE

## 2022-04-24 ENCOUNTER — OUTPATIENT (OUTPATIENT)
Dept: OUTPATIENT SERVICES | Facility: HOSPITAL | Age: 67
LOS: 1 days | End: 2022-04-24
Payer: COMMERCIAL

## 2022-04-24 DIAGNOSIS — N32.81 OVERACTIVE BLADDER: Chronic | ICD-10-CM

## 2022-04-24 DIAGNOSIS — Z96.641 PRESENCE OF RIGHT ARTIFICIAL HIP JOINT: Chronic | ICD-10-CM

## 2022-04-24 DIAGNOSIS — Z87.442 PERSONAL HISTORY OF URINARY CALCULI: Chronic | ICD-10-CM

## 2022-04-24 DIAGNOSIS — Z90.710 ACQUIRED ABSENCE OF BOTH CERVIX AND UTERUS: Chronic | ICD-10-CM

## 2022-04-24 DIAGNOSIS — Z98.49 CATARACT EXTRACTION STATUS, UNSPECIFIED EYE: Chronic | ICD-10-CM

## 2022-04-24 DIAGNOSIS — Z00.8 ENCOUNTER FOR OTHER GENERAL EXAMINATION: ICD-10-CM

## 2022-04-24 DIAGNOSIS — Z96.642 PRESENCE OF LEFT ARTIFICIAL HIP JOINT: Chronic | ICD-10-CM

## 2022-04-24 DIAGNOSIS — Z98.89 OTHER SPECIFIED POSTPROCEDURAL STATES: Chronic | ICD-10-CM

## 2022-04-24 DIAGNOSIS — N20.0 CALCULUS OF KIDNEY: ICD-10-CM

## 2022-04-24 DIAGNOSIS — Z90.89 ACQUIRED ABSENCE OF OTHER ORGANS: Chronic | ICD-10-CM

## 2022-04-24 PROCEDURE — 74176 CT ABD & PELVIS W/O CONTRAST: CPT | Mod: 26

## 2022-04-24 PROCEDURE — 74176 CT ABD & PELVIS W/O CONTRAST: CPT

## 2022-04-27 NOTE — ED ADULT NURSE NOTE - IN THE PAST 12 MONTHS HAVE YOU USED DRUGS OTHER THAN THOSE REQUIRED FOR MEDICAL REASON?
#1 chronic cough  Reviewed common etiologies for chronic cough including postnasal drip syndrome, extrinsic asthma, cough variant asthma, GERD, postinfectious cough. Cough is improved over the past week or so. Still with postnasal drip and throat clearing. Recs: Trial of Flonase 2 sprays per nostril once a day. Best use on a daily basis to prevent it may take up to 3 to 5 days to take full effect  Trial of Xyzal, levocetirizine 5 mg once a night at bedtime as an antihistamine  May consider Singulair if refractory  May consider testing to environmental allergens to screen for allergic triggers if not improving  Continue treatment of GERD with current proton pump inhibitor given his history of GERD and Quezada's  May consider nasal saline sinus rinses as well    Reviewed allergy avoidance measures and potential treatment option of immunotherapy    Reviewed anti-GERD measures.   Continue with current proton pump inhibitor    Recommend COVID vaccinations if none have been completed to date    Consider baseline chest x-ray if not improving  Consider PFT testing if not improving
No

## 2022-05-09 ENCOUNTER — OUTPATIENT (OUTPATIENT)
Dept: OUTPATIENT SERVICES | Facility: HOSPITAL | Age: 67
LOS: 1 days | End: 2022-05-09
Payer: COMMERCIAL

## 2022-05-09 ENCOUNTER — APPOINTMENT (OUTPATIENT)
Dept: UROLOGY | Facility: CLINIC | Age: 67
End: 2022-05-09
Payer: MEDICARE

## 2022-05-09 VITALS
TEMPERATURE: 97.2 F | DIASTOLIC BLOOD PRESSURE: 90 MMHG | RESPIRATION RATE: 18 BRPM | OXYGEN SATURATION: 100 % | SYSTOLIC BLOOD PRESSURE: 138 MMHG | HEART RATE: 90 BPM

## 2022-05-09 DIAGNOSIS — Z98.89 OTHER SPECIFIED POSTPROCEDURAL STATES: Chronic | ICD-10-CM

## 2022-05-09 DIAGNOSIS — N32.81 OVERACTIVE BLADDER: ICD-10-CM

## 2022-05-09 DIAGNOSIS — N39.46 MIXED INCONTINENCE: ICD-10-CM

## 2022-05-09 DIAGNOSIS — N32.81 OVERACTIVE BLADDER: Chronic | ICD-10-CM

## 2022-05-09 DIAGNOSIS — R35.0 FREQUENCY OF MICTURITION: ICD-10-CM

## 2022-05-09 DIAGNOSIS — N39.41 URGE INCONTINENCE: ICD-10-CM

## 2022-05-09 DIAGNOSIS — Z90.710 ACQUIRED ABSENCE OF BOTH CERVIX AND UTERUS: Chronic | ICD-10-CM

## 2022-05-09 DIAGNOSIS — Z87.442 PERSONAL HISTORY OF URINARY CALCULI: Chronic | ICD-10-CM

## 2022-05-09 DIAGNOSIS — Z90.89 ACQUIRED ABSENCE OF OTHER ORGANS: Chronic | ICD-10-CM

## 2022-05-09 DIAGNOSIS — Z98.49 CATARACT EXTRACTION STATUS, UNSPECIFIED EYE: Chronic | ICD-10-CM

## 2022-05-09 DIAGNOSIS — Z96.642 PRESENCE OF LEFT ARTIFICIAL HIP JOINT: Chronic | ICD-10-CM

## 2022-05-09 DIAGNOSIS — Z96.641 PRESENCE OF RIGHT ARTIFICIAL HIP JOINT: Chronic | ICD-10-CM

## 2022-05-09 PROCEDURE — 52000 CYSTOURETHROSCOPY: CPT

## 2022-05-09 PROCEDURE — 51741 ELECTRO-UROFLOWMETRY FIRST: CPT | Mod: 26

## 2022-05-09 PROCEDURE — 51784 ANAL/URINARY MUSCLE STUDY: CPT

## 2022-05-09 PROCEDURE — 51797 INTRAABDOMINAL PRESSURE TEST: CPT

## 2022-05-09 PROCEDURE — 51741 ELECTRO-UROFLOWMETRY FIRST: CPT

## 2022-05-09 PROCEDURE — 51784 ANAL/URINARY MUSCLE STUDY: CPT | Mod: 26

## 2022-05-09 PROCEDURE — 51728 CYSTOMETROGRAM W/VP: CPT | Mod: 26

## 2022-05-09 PROCEDURE — 51797 INTRAABDOMINAL PRESSURE TEST: CPT | Mod: 26

## 2022-05-09 PROCEDURE — 51728 CYSTOMETROGRAM W/VP: CPT

## 2022-05-09 NOTE — HISTORY OF PRESENT ILLNESS
[FreeTextEntry1] : : 1955 \par Referring Provider: none \par \par HPI: Ms. KAREN PETERSON is a 67 year yo F with a PMHx notable for kidney stones previously for 7 mm in the R kidney. She underwent URS and is here today for follow up. \par \par She also reports she has colitis that is being treated with Budesonide. She has troubles with diarrhea as a result of her colitis. Her last colonoscopy was during the summer of . \par \par Her Litholink from 21 showed low urine output, low citrate, SS CaOx, and SS UA. Recs include increasing urine volume, adding citrate (borderline 460s) with lemon water supplementation and decreasing Shabbir. \par \par ULS today with no stones in L side, two collections echogenic ~5 mm on  R side in MP and LP. Plan for CT to further characterize.\par  [Urinary Incontinence] : no urinary incontinence [Urinary Retention] : no urinary retention [Urinary Urgency] : no urinary urgency [Urinary Frequency] : no urinary frequency

## 2022-05-17 ENCOUNTER — APPOINTMENT (OUTPATIENT)
Dept: BARIATRICS/WEIGHT MGMT | Facility: CLINIC | Age: 67
End: 2022-05-17
Payer: MEDICARE

## 2022-05-17 VITALS
BODY MASS INDEX: 53.92 KG/M2 | HEIGHT: 62 IN | WEIGHT: 293 LBS | OXYGEN SATURATION: 97 % | DIASTOLIC BLOOD PRESSURE: 70 MMHG | HEART RATE: 88 BPM | SYSTOLIC BLOOD PRESSURE: 140 MMHG

## 2022-05-17 PROCEDURE — 99213 OFFICE O/P EST LOW 20 MIN: CPT

## 2022-05-17 RX ORDER — DIPHENOXYLATE HYDROCHLORIDE AND ATROPINE SULFATE 2.5; .025 MG/1; MG/1
2.5-0.025 TABLET ORAL
Refills: 0 | Status: ACTIVE | COMMUNITY

## 2022-05-17 RX ORDER — BUDESONIDE 3 MG/1
3 CAPSULE, COATED PELLETS ORAL
Refills: 0 | Status: DISCONTINUED | COMMUNITY
End: 2022-05-17

## 2022-05-17 RX ORDER — HYOSCYAMINE SULFATE 0.12 MG/1
0.12 TABLET SUBLINGUAL
Refills: 0 | Status: ACTIVE | COMMUNITY

## 2022-05-17 RX ORDER — PHENTERMINE HYDROCHLORIDE 37.5 MG/1
37.5 TABLET ORAL
Qty: 30 | Refills: 0 | Status: ACTIVE | COMMUNITY
Start: 2022-03-09 | End: 1900-01-01

## 2022-05-17 NOTE — ASSESSMENT
[FreeTextEntry1] : 66 yo woman with lap-band placement (s/p band removal March 2017), HTN, DM2, HLD, nephrolithiasis, overactive bladder, and ? of Crohn's dz? and hip dysplasia s/p bilateral replacement who presents for weight management f/u after substantial weight regain\par \par # Obesity/Metabolic Syndrome: \par cont to hold topirmate secondary to nephrolithiasis\par agree with phentermine use\par hold off on other meds given unstable GI situation\par referral to RD given confusion of GI issues vs. obesity mgt\par \par #Crohn's Dz\par not currently being actively treated for Crohn's, unclear of how this was diagnosed? \par consider second opinion and active treatment if appropriate\par \par rtc in 4 weeks.\par \par \par Bariatric Surgery History: S/p Lap band placement and removal\par \par Obesity Co-Morbidities: HTN, HLD, DM2\par \par Anti-Obesity Medications: Phentermine\par \par Obesity Medication Side Effects: nephrolithiasis on topiramate\par . \par

## 2022-05-18 ENCOUNTER — OUTPATIENT (OUTPATIENT)
Dept: OUTPATIENT SERVICES | Facility: HOSPITAL | Age: 67
LOS: 1 days | End: 2022-05-18
Payer: COMMERCIAL

## 2022-05-18 VITALS
SYSTOLIC BLOOD PRESSURE: 118 MMHG | OXYGEN SATURATION: 96 % | HEART RATE: 78 BPM | WEIGHT: 293 LBS | DIASTOLIC BLOOD PRESSURE: 87 MMHG | HEIGHT: 62 IN | RESPIRATION RATE: 15 BRPM | TEMPERATURE: 98 F

## 2022-05-18 DIAGNOSIS — Z90.89 ACQUIRED ABSENCE OF OTHER ORGANS: Chronic | ICD-10-CM

## 2022-05-18 DIAGNOSIS — Z98.890 OTHER SPECIFIED POSTPROCEDURAL STATES: Chronic | ICD-10-CM

## 2022-05-18 DIAGNOSIS — N20.0 CALCULUS OF KIDNEY: ICD-10-CM

## 2022-05-18 DIAGNOSIS — Z96.642 PRESENCE OF LEFT ARTIFICIAL HIP JOINT: Chronic | ICD-10-CM

## 2022-05-18 DIAGNOSIS — Z98.49 CATARACT EXTRACTION STATUS, UNSPECIFIED EYE: Chronic | ICD-10-CM

## 2022-05-18 DIAGNOSIS — N32.81 OVERACTIVE BLADDER: Chronic | ICD-10-CM

## 2022-05-18 DIAGNOSIS — Z87.442 PERSONAL HISTORY OF URINARY CALCULI: Chronic | ICD-10-CM

## 2022-05-18 DIAGNOSIS — Z90.710 ACQUIRED ABSENCE OF BOTH CERVIX AND UTERUS: Chronic | ICD-10-CM

## 2022-05-18 DIAGNOSIS — Z01.818 ENCOUNTER FOR OTHER PREPROCEDURAL EXAMINATION: ICD-10-CM

## 2022-05-18 DIAGNOSIS — Z98.89 OTHER SPECIFIED POSTPROCEDURAL STATES: Chronic | ICD-10-CM

## 2022-05-18 DIAGNOSIS — Z96.641 PRESENCE OF RIGHT ARTIFICIAL HIP JOINT: Chronic | ICD-10-CM

## 2022-05-18 LAB
ANION GAP SERPL CALC-SCNC: 13 MMOL/L — SIGNIFICANT CHANGE UP (ref 5–17)
BUN SERPL-MCNC: 10 MG/DL — SIGNIFICANT CHANGE UP (ref 7–23)
CALCIUM SERPL-MCNC: 9.7 MG/DL — SIGNIFICANT CHANGE UP (ref 8.4–10.5)
CHLORIDE SERPL-SCNC: 100 MMOL/L — SIGNIFICANT CHANGE UP (ref 96–108)
CO2 SERPL-SCNC: 25 MMOL/L — SIGNIFICANT CHANGE UP (ref 22–31)
CREAT SERPL-MCNC: 0.78 MG/DL — SIGNIFICANT CHANGE UP (ref 0.5–1.3)
EGFR: 83 ML/MIN/1.73M2 — SIGNIFICANT CHANGE UP
GLUCOSE SERPL-MCNC: 74 MG/DL — SIGNIFICANT CHANGE UP (ref 70–99)
HCT VFR BLD CALC: 43.3 % — SIGNIFICANT CHANGE UP (ref 34.5–45)
HGB BLD-MCNC: 13.5 G/DL — SIGNIFICANT CHANGE UP (ref 11.5–15.5)
MCHC RBC-ENTMCNC: 27.9 PG — SIGNIFICANT CHANGE UP (ref 27–34)
MCHC RBC-ENTMCNC: 31.2 GM/DL — LOW (ref 32–36)
MCV RBC AUTO: 89.5 FL — SIGNIFICANT CHANGE UP (ref 80–100)
NRBC # BLD: 0 /100 WBCS — SIGNIFICANT CHANGE UP (ref 0–0)
PLATELET # BLD AUTO: 255 K/UL — SIGNIFICANT CHANGE UP (ref 150–400)
POTASSIUM SERPL-MCNC: 4.1 MMOL/L — SIGNIFICANT CHANGE UP (ref 3.5–5.3)
POTASSIUM SERPL-SCNC: 4.1 MMOL/L — SIGNIFICANT CHANGE UP (ref 3.5–5.3)
RBC # BLD: 4.84 M/UL — SIGNIFICANT CHANGE UP (ref 3.8–5.2)
RBC # FLD: 13.8 % — SIGNIFICANT CHANGE UP (ref 10.3–14.5)
SODIUM SERPL-SCNC: 138 MMOL/L — SIGNIFICANT CHANGE UP (ref 135–145)
WBC # BLD: 8.53 K/UL — SIGNIFICANT CHANGE UP (ref 3.8–10.5)
WBC # FLD AUTO: 8.53 K/UL — SIGNIFICANT CHANGE UP (ref 3.8–10.5)

## 2022-05-18 PROCEDURE — G0463: CPT

## 2022-05-18 PROCEDURE — 80048 BASIC METABOLIC PNL TOTAL CA: CPT

## 2022-05-18 PROCEDURE — 87086 URINE CULTURE/COLONY COUNT: CPT

## 2022-05-18 PROCEDURE — 83036 HEMOGLOBIN GLYCOSYLATED A1C: CPT

## 2022-05-18 PROCEDURE — 85027 COMPLETE CBC AUTOMATED: CPT

## 2022-05-18 RX ORDER — ALPRAZOLAM 0.25 MG
0.5 TABLET ORAL
Qty: 0 | Refills: 0 | DISCHARGE

## 2022-05-18 RX ORDER — TOPIRAMATE 25 MG
1 TABLET ORAL
Qty: 0 | Refills: 0 | DISCHARGE

## 2022-05-18 RX ORDER — BUDESONIDE, MICRONIZED 100 %
1 POWDER (GRAM) MISCELLANEOUS
Qty: 0 | Refills: 0 | DISCHARGE

## 2022-05-18 RX ORDER — OMEPRAZOLE 10 MG/1
1 CAPSULE, DELAYED RELEASE ORAL
Qty: 0 | Refills: 0 | DISCHARGE

## 2022-05-18 RX ORDER — CEFAZOLIN SODIUM 1 G
3000 VIAL (EA) INJECTION ONCE
Refills: 0 | Status: DISCONTINUED | OUTPATIENT
Start: 2022-06-08 | End: 2022-06-22

## 2022-05-18 NOTE — H&P PST ADULT - PROBLEM SELECTOR PLAN 2
BMI 54.7; OR Booking notified  Advised pt to hold weight loss medication 7 days prior to procedure; Last dose of Phentermine on 2/16/22 BMI 54.7; OR Booking notified  instructed  Last dose of Phentermine on 6/2

## 2022-05-18 NOTE — H&P PST ADULT - FALL HARM RISK - RISK INTERVENTIONS

## 2022-05-18 NOTE — H&P PST ADULT - PROBLEM SELECTOR PLAN 1
Pt is scheduled for a cystoscopy, right ureteroscopy, laser lithotripsy and stent placement with Dr. Murrieta on 2/23/22  Covid PCR test scheduled for 2/20/22 at [facility]  CBC, BMP, HGA1C, and urine culture ordered and obtained  FS on admit due to hx of prediabetes  Pending medical and cardiac evaluations  Most recent echo, stress and ekg requested from cardiologist Pt is scheduled for a cystoscopy, right ureteroscopy, laser lithotripsy and stent placement

## 2022-05-18 NOTE — H&P PST ADULT - NSICDXPASTMEDICALHX_GEN_ALL_CORE_FT
PAST MEDICAL HISTORY:  Calculus of kidney     Diverticulitis on Budesonide; Last flare-up 6 months ago    Essential hypertension     Former smoker 1 PPD x 11 years; Quit in 1990    GERD (Gastroesophageal Reflux Disease)     Gout on Allopurinol    Irritable bowel syndrome, unspecified type     Morbid Obesity BMI 54.7    Osteoarthritis B/L Knee OA, B/L hip replacements    Overactive Bladder on Trospium    Pancreatitis Chronic stable since 2008 - resolved since cholecystectomy in 2011    Prediabetes      PAST MEDICAL HISTORY:  2019 novel coronavirus disease (COVID-19) 2/20/2022- no hospitalization required    Calculus of kidney     Diverticulitis on Budesonide; Last flare-up 6 months ago    Essential hypertension     Former smoker 1 PPD x 11 years; Quit in 1990    GERD (Gastroesophageal Reflux Disease)     Gout on Allopurinol    H/O Crohn's disease     Irritable bowel syndrome, unspecified type     Morbid Obesity BMI 54.7    Osteoarthritis B/L Knee OA, B/L hip replacements    Overactive Bladder on Trospium    Pancreatitis Chronic stable since 2008 - resolved since cholecystectomy in 2011    Prediabetes

## 2022-05-18 NOTE — H&P PST ADULT - ATTENDING COMMENTS
PAtient seen and examined, plan for cystoscopy, right ureteroscopy, laser lithotripsy and stent placement

## 2022-05-18 NOTE — H&P PST ADULT - HISTORY OF PRESENT ILLNESS
67 year old female with PMH Morbid Obesity (BMI 54.7), Former Smoker (1 PPD x 11 years; Quit in 1990), Lap. Band (2006) s/p Removal (2017), OA s/p B/L THR, Prediabetes, HTN, GERD, Lap. Cholecystectomy (2011), Total Abd. Hysterectomy 2/2 Fibroids (2014), OAB s/p Interstim Placement (2016), IBS and Diverticulitis reports c/o tea-colored urine x 1 month with intermittent right flank pain. Pt f/u with urologist who performed litholink testing Q 6 months due to history of kidney stones. Litholink from 1/13/21 showed low urine output, low citrate, SS CaOz and SS UA. CT A/P from 7/21/21 revealed multiple stones in right kidney measuring up to 7 mm and a 6 mm upper pole right renal calculus. Pt now presents to PST for scheduled Cystoscopy, Right ureteroscopy, Laser Lithotripsy and stent placement with Dr. Murrieta on 2/23/22.    Received the Pfizer Covid vaccine 2nd dose 5/17/2021; Pending Booster  Covid PCR test scheduled for 2/20/22  Denies Recent travel, Exposure or Covid symptoms   No recent hospitalizations over the past 3 months 67 year old female with PMH Morbid Obesity (BMI 54.7), Former Smoker (1 PPD x 11 years; Quit in 1990), WHIT, Lap. Band (2006) s/p Removal (2017), OA s/p B/L THR, Prediabetes (last A1C=6.1), HTN, GERD, Lap. Cholecystectomy (2011), Total Abd. Hysterectomy 2/2 Fibroids (2014), OAB s/p Interstim Placement (2016), IBS, Diverticulitis, Crohn's (on diphenolate/atropine).  s/p cystoscopy, lithotripsy in 2/2022, recent follow up imaging study revealed right renal calculus. now presents to [facility] scheduled for cystoscopy, lithotripsy on 6/8.   covid test scheduled on 6/5 at [facility].  67 year old female with PMH Morbid Obesity (BMI 54.7), Former Smoker (1 PPD x 11 years; Quit in 1990), WHIT, Lap. Band (2006) s/p Removal (2017), OA s/p B/L THR, Prediabetes (last A1C=6.1), HTN, GERD, Lap. Cholecystectomy (2011), Total Abd. Hysterectomy 2/2 Fibroids (2014), OAB s/p Interstim Placement (2016), IBS, Diverticulitis, Crohn's (on diphenolate/atropine).  s/p cystoscopy, lithotripsy in 2/2022, recent follow up imaging study revealed right renal calculus. now presents to [facility] scheduled for cystoscopy, lithotripsy on 6/8.   covid test scheduled on 6/5 at [facility].   **discussed case on Teams chat on 5/18, per Dr. Wu, surgery re-routed to Main OR due pt's BMI>45 and requiring general anestheisa, email OR booking and surgeon.**

## 2022-05-18 NOTE — H&P PST ADULT - CARDIOVASCULAR SYMPTOMS
GARCIA when walking > 10 steps or climbing 1 flight of stairs; Denies chest pain, syncope, palpitations or claudication. Sleeps with 2 pillows at night. Pending cardiac eval. on 2/10/22/dyspnea on exertion

## 2022-05-18 NOTE — H&P PST ADULT - NSICDXPASTSURGICALHX_GEN_ALL_CORE_FT
PAST SURGICAL HISTORY:  History of Cholecystectomy- 2011/April 2011; Laparoscopic    History of kidney stones s/p percutaneous stone extraction in 2011    History of tonsillectomy age 20    Lap-Band Surgery - 2006 Removal of Lap band- 03/2017    OAB (overactive bladder) s/p Interstim insertion (04/2016)  revision 2017, Replacement interstim implant 8/2018    S/P cataract surgery B/L; 2018 - with IOL Implant    S/P D&C (status post dilation and curettage) 2010 and 2014    S/P hip replacement, left 4/2019    S/P hip replacement, right 12/2018    S/P hysterectomy 2014; Total abdominal (secondary to fibroids and precancerous cells)     PAST SURGICAL HISTORY:  History of Cholecystectomy- 2011/April 2011; Laparoscopic    History of extraction of renal calculus s/p cystoscopy, lithotripsy 2/2022    History of kidney stones s/p percutaneous stone extraction in 2011    History of tonsillectomy age 20    Lap-Band Surgery - 2006 Removal of Lap band- 03/2017    OAB (overactive bladder) s/p Interstim insertion (04/2016)  revision 2017, Replacement interstim implant 8/2018    S/P cataract surgery B/L; 2018 - with IOL Implant    S/P D&C (status post dilation and curettage) 2010 and 2014    S/P hip replacement, left 4/2019    S/P hip replacement, right 12/2018    S/P hysterectomy 2014; Total abdominal (secondary to fibroids and precancerous cells)

## 2022-05-19 ENCOUNTER — LABORATORY RESULT (OUTPATIENT)
Age: 67
End: 2022-05-19

## 2022-05-19 LAB
A1C WITH ESTIMATED AVERAGE GLUCOSE RESULT: 6 % — HIGH (ref 4–5.6)
CULTURE RESULTS: SIGNIFICANT CHANGE UP
ESTIMATED AVERAGE GLUCOSE: 126 MG/DL — HIGH (ref 68–114)
SPECIMEN SOURCE: SIGNIFICANT CHANGE UP

## 2022-05-23 PROBLEM — Z87.19 PERSONAL HISTORY OF OTHER DISEASES OF THE DIGESTIVE SYSTEM: Chronic | Status: ACTIVE | Noted: 2022-05-18

## 2022-05-23 PROBLEM — U07.1 COVID-19: Chronic | Status: ACTIVE | Noted: 2022-05-18

## 2022-05-24 ENCOUNTER — APPOINTMENT (OUTPATIENT)
Dept: UROLOGY | Facility: CLINIC | Age: 67
End: 2022-05-24
Payer: MEDICARE

## 2022-05-24 ENCOUNTER — OUTPATIENT (OUTPATIENT)
Dept: OUTPATIENT SERVICES | Facility: HOSPITAL | Age: 67
LOS: 1 days | End: 2022-05-24
Payer: COMMERCIAL

## 2022-05-24 DIAGNOSIS — N32.81 OVERACTIVE BLADDER: Chronic | ICD-10-CM

## 2022-05-24 DIAGNOSIS — Z90.710 ACQUIRED ABSENCE OF BOTH CERVIX AND UTERUS: Chronic | ICD-10-CM

## 2022-05-24 DIAGNOSIS — Z98.49 CATARACT EXTRACTION STATUS, UNSPECIFIED EYE: Chronic | ICD-10-CM

## 2022-05-24 DIAGNOSIS — Z90.89 ACQUIRED ABSENCE OF OTHER ORGANS: Chronic | ICD-10-CM

## 2022-05-24 DIAGNOSIS — Z96.641 PRESENCE OF RIGHT ARTIFICIAL HIP JOINT: Chronic | ICD-10-CM

## 2022-05-24 DIAGNOSIS — R35.0 FREQUENCY OF MICTURITION: ICD-10-CM

## 2022-05-24 DIAGNOSIS — Z87.442 PERSONAL HISTORY OF URINARY CALCULI: Chronic | ICD-10-CM

## 2022-05-24 DIAGNOSIS — Z98.890 OTHER SPECIFIED POSTPROCEDURAL STATES: Chronic | ICD-10-CM

## 2022-05-24 DIAGNOSIS — Z98.89 OTHER SPECIFIED POSTPROCEDURAL STATES: Chronic | ICD-10-CM

## 2022-05-24 DIAGNOSIS — Z96.642 PRESENCE OF LEFT ARTIFICIAL HIP JOINT: Chronic | ICD-10-CM

## 2022-05-24 LAB
APPEARANCE: CLEAR
BILIRUBIN URINE: NEGATIVE
BLOOD URINE: ABNORMAL
COLOR: NORMAL
GLUCOSE QUALITATIVE U: NEGATIVE
KETONES URINE: NEGATIVE
LEUKOCYTE ESTERASE URINE: NEGATIVE
NITRITE URINE: NEGATIVE
PH URINE: 6
PROTEIN URINE: NEGATIVE
SPECIFIC GRAVITY URINE: 1.02
UROBILINOGEN URINE: NORMAL

## 2022-05-24 PROCEDURE — 99212 OFFICE O/P EST SF 10 MIN: CPT

## 2022-05-24 PROCEDURE — 76775 US EXAM ABDO BACK WALL LIM: CPT | Mod: 26

## 2022-05-24 PROCEDURE — 76775 US EXAM ABDO BACK WALL LIM: CPT

## 2022-05-24 NOTE — HISTORY OF PRESENT ILLNESS
[FreeTextEntry1] : : 1955 \par Referring Provider: none \par \par HPI: Ms. KAREN PETERSON is a 67 year yo F with a PMHx notable for kidney stones previously for 7 mm in the R kidney. She underwent URS and is here today for follow up. \par \par She also reports she has colitis that is being treated with Budesonide. She has troubles with diarrhea as a result of her colitis. Her last colonoscopy was during the summer of . \par \par Her Litholink from 21 showed low urine output, low citrate, SS CaOx, and SS UA. Recs include increasing urine volume, adding citrate (borderline 460s) with lemon water supplementation and decreasing Shabbir. \par \par ULS today with no stones in L side, two collections echogenic ~5 mm on  R side in MP and LP. Plan for CT to further characterize.\par \par :\par ULS with with R sided 5 mm collection noted and confirmed on CT. Plan for R URS/LL/stent. All r/b/a discussed. \par  [Urinary Incontinence] : no urinary incontinence [Urinary Retention] : no urinary retention [Urinary Urgency] : no urinary urgency [Urinary Frequency] : no urinary frequency

## 2022-05-31 ENCOUNTER — NON-APPOINTMENT (OUTPATIENT)
Age: 67
End: 2022-05-31

## 2022-05-31 DIAGNOSIS — N39.0 URINARY TRACT INFECTION, SITE NOT SPECIFIED: ICD-10-CM

## 2022-05-31 RX ORDER — SULFAMETHOXAZOLE AND TRIMETHOPRIM 800; 160 MG/1; MG/1
800-160 TABLET ORAL
Qty: 10 | Refills: 0 | Status: ACTIVE | COMMUNITY
Start: 2022-05-31 | End: 1900-01-01

## 2022-06-05 ENCOUNTER — OUTPATIENT (OUTPATIENT)
Dept: OUTPATIENT SERVICES | Facility: HOSPITAL | Age: 67
LOS: 1 days | End: 2022-06-05
Payer: COMMERCIAL

## 2022-06-05 DIAGNOSIS — Z90.710 ACQUIRED ABSENCE OF BOTH CERVIX AND UTERUS: Chronic | ICD-10-CM

## 2022-06-05 DIAGNOSIS — Z87.442 PERSONAL HISTORY OF URINARY CALCULI: Chronic | ICD-10-CM

## 2022-06-05 DIAGNOSIS — Z96.641 PRESENCE OF RIGHT ARTIFICIAL HIP JOINT: Chronic | ICD-10-CM

## 2022-06-05 DIAGNOSIS — Z90.89 ACQUIRED ABSENCE OF OTHER ORGANS: Chronic | ICD-10-CM

## 2022-06-05 DIAGNOSIS — N32.81 OVERACTIVE BLADDER: Chronic | ICD-10-CM

## 2022-06-05 DIAGNOSIS — Z98.890 OTHER SPECIFIED POSTPROCEDURAL STATES: Chronic | ICD-10-CM

## 2022-06-05 DIAGNOSIS — Z98.49 CATARACT EXTRACTION STATUS, UNSPECIFIED EYE: Chronic | ICD-10-CM

## 2022-06-05 DIAGNOSIS — Z96.642 PRESENCE OF LEFT ARTIFICIAL HIP JOINT: Chronic | ICD-10-CM

## 2022-06-05 DIAGNOSIS — Z11.52 ENCOUNTER FOR SCREENING FOR COVID-19: ICD-10-CM

## 2022-06-05 DIAGNOSIS — Z98.89 OTHER SPECIFIED POSTPROCEDURAL STATES: Chronic | ICD-10-CM

## 2022-06-05 LAB — SARS-COV-2 RNA SPEC QL NAA+PROBE: SIGNIFICANT CHANGE UP

## 2022-06-06 RX ORDER — AMOXICILLIN AND CLAVULANATE POTASSIUM 875; 125 MG/1; MG/1
875-125 TABLET, COATED ORAL
Qty: 10 | Refills: 0 | Status: ACTIVE | COMMUNITY
Start: 2022-06-06 | End: 1900-01-01

## 2022-06-07 ENCOUNTER — TRANSCRIPTION ENCOUNTER (OUTPATIENT)
Age: 67
End: 2022-06-07

## 2022-06-07 DIAGNOSIS — N20.0 CALCULUS OF KIDNEY: ICD-10-CM

## 2022-06-08 ENCOUNTER — APPOINTMENT (OUTPATIENT)
Dept: UROLOGY | Facility: HOSPITAL | Age: 67
End: 2022-06-08

## 2022-06-08 ENCOUNTER — RESULT REVIEW (OUTPATIENT)
Age: 67
End: 2022-06-08

## 2022-06-08 ENCOUNTER — TRANSCRIPTION ENCOUNTER (OUTPATIENT)
Age: 67
End: 2022-06-08

## 2022-06-08 ENCOUNTER — OUTPATIENT (OUTPATIENT)
Dept: OUTPATIENT SERVICES | Facility: HOSPITAL | Age: 67
LOS: 1 days | End: 2022-06-08
Payer: COMMERCIAL

## 2022-06-08 VITALS
RESPIRATION RATE: 17 BRPM | HEART RATE: 81 BPM | DIASTOLIC BLOOD PRESSURE: 85 MMHG | WEIGHT: 293 LBS | TEMPERATURE: 98 F | HEIGHT: 62 IN | OXYGEN SATURATION: 97 % | SYSTOLIC BLOOD PRESSURE: 139 MMHG

## 2022-06-08 VITALS
TEMPERATURE: 97 F | OXYGEN SATURATION: 96 % | DIASTOLIC BLOOD PRESSURE: 83 MMHG | HEART RATE: 79 BPM | SYSTOLIC BLOOD PRESSURE: 109 MMHG | RESPIRATION RATE: 18 BRPM

## 2022-06-08 DIAGNOSIS — N20.0 CALCULUS OF KIDNEY: ICD-10-CM

## 2022-06-08 DIAGNOSIS — N32.81 OVERACTIVE BLADDER: Chronic | ICD-10-CM

## 2022-06-08 DIAGNOSIS — Z87.442 PERSONAL HISTORY OF URINARY CALCULI: Chronic | ICD-10-CM

## 2022-06-08 DIAGNOSIS — Z98.49 CATARACT EXTRACTION STATUS, UNSPECIFIED EYE: Chronic | ICD-10-CM

## 2022-06-08 DIAGNOSIS — Z90.89 ACQUIRED ABSENCE OF OTHER ORGANS: Chronic | ICD-10-CM

## 2022-06-08 DIAGNOSIS — Z98.890 OTHER SPECIFIED POSTPROCEDURAL STATES: Chronic | ICD-10-CM

## 2022-06-08 DIAGNOSIS — Z98.89 OTHER SPECIFIED POSTPROCEDURAL STATES: Chronic | ICD-10-CM

## 2022-06-08 DIAGNOSIS — Z90.710 ACQUIRED ABSENCE OF BOTH CERVIX AND UTERUS: Chronic | ICD-10-CM

## 2022-06-08 DIAGNOSIS — Z96.642 PRESENCE OF LEFT ARTIFICIAL HIP JOINT: Chronic | ICD-10-CM

## 2022-06-08 DIAGNOSIS — Z96.641 PRESENCE OF RIGHT ARTIFICIAL HIP JOINT: Chronic | ICD-10-CM

## 2022-06-08 LAB — GLUCOSE BLDC GLUCOMTR-MCNC: 105 MG/DL — HIGH (ref 70–99)

## 2022-06-08 PROCEDURE — C9399: CPT

## 2022-06-08 PROCEDURE — U0005: CPT

## 2022-06-08 PROCEDURE — C1889: CPT

## 2022-06-08 PROCEDURE — 52352 CYSTOURETERO W/STONE REMOVE: CPT | Mod: RT

## 2022-06-08 PROCEDURE — C1758: CPT

## 2022-06-08 PROCEDURE — 88300 SURGICAL PATH GROSS: CPT | Mod: 26

## 2022-06-08 PROCEDURE — C1769: CPT

## 2022-06-08 PROCEDURE — C2625: CPT

## 2022-06-08 PROCEDURE — 52332 CYSTOSCOPY AND TREATMENT: CPT | Mod: RT

## 2022-06-08 PROCEDURE — 82962 GLUCOSE BLOOD TEST: CPT

## 2022-06-08 PROCEDURE — 74420 UROGRAPHY RTRGR +-KUB: CPT | Mod: 26

## 2022-06-08 PROCEDURE — 82365 CALCULUS SPECTROSCOPY: CPT

## 2022-06-08 PROCEDURE — C9803: CPT

## 2022-06-08 PROCEDURE — ZZZZZ: CPT

## 2022-06-08 PROCEDURE — 76000 FLUOROSCOPY <1 HR PHYS/QHP: CPT

## 2022-06-08 PROCEDURE — 88300 SURGICAL PATH GROSS: CPT

## 2022-06-08 PROCEDURE — U0003: CPT

## 2022-06-08 DEVICE — STONE BASKET ZEROTIP NITINOL 4-WIRE 1.9FR 120CM X 12MM: Type: IMPLANTABLE DEVICE | Status: FUNCTIONAL

## 2022-06-08 DEVICE — URETERAL CATH OPEN END 5FR 70CM: Type: IMPLANTABLE DEVICE | Status: FUNCTIONAL

## 2022-06-08 DEVICE — GUIDEWIRE SENSOR DUAL-FLEX NITINOL STRAIGHT .035" X 150CM: Type: IMPLANTABLE DEVICE | Status: FUNCTIONAL

## 2022-06-08 DEVICE — STENT URET 7FR 24CM: Type: IMPLANTABLE DEVICE | Status: FUNCTIONAL

## 2022-06-08 DEVICE — URETERAL SHEATH NAVIGATOR 11/13FR X 28CM: Type: IMPLANTABLE DEVICE | Status: FUNCTIONAL

## 2022-06-08 RX ORDER — ZINC SULFATE TAB 220 MG (50 MG ZINC EQUIVALENT) 220 (50 ZN) MG
1 TAB ORAL
Qty: 0 | Refills: 0 | DISCHARGE

## 2022-06-08 RX ORDER — FENTANYL CITRATE 50 UG/ML
25 INJECTION INTRAVENOUS
Refills: 0 | Status: DISCONTINUED | OUTPATIENT
Start: 2022-06-08 | End: 2022-06-08

## 2022-06-08 RX ORDER — PHENTERMINE HCL 30 MG
30 CAPSULE ORAL
Qty: 0 | Refills: 0 | DISCHARGE

## 2022-06-08 RX ORDER — SEMAGLUTIDE 0.68 MG/ML
0.5 INJECTION, SOLUTION SUBCUTANEOUS
Qty: 0 | Refills: 0 | DISCHARGE

## 2022-06-08 RX ORDER — SODIUM CHLORIDE 9 MG/ML
1000 INJECTION, SOLUTION INTRAVENOUS
Refills: 0 | Status: DISCONTINUED | OUTPATIENT
Start: 2022-06-08 | End: 2022-06-08

## 2022-06-08 RX ORDER — TROSPIUM CHLORIDE 20 MG/1
0 TABLET, FILM COATED ORAL
Qty: 0 | Refills: 0 | DISCHARGE

## 2022-06-08 RX ORDER — ALPRAZOLAM 0.25 MG
0.5 TABLET ORAL
Qty: 0 | Refills: 0 | DISCHARGE

## 2022-06-08 RX ORDER — HYOSCYAMINE SULFATE 0.13 MG
1 TABLET ORAL
Qty: 0 | Refills: 0 | DISCHARGE

## 2022-06-08 RX ORDER — ONDANSETRON 8 MG/1
4 TABLET, FILM COATED ORAL ONCE
Refills: 0 | Status: COMPLETED | OUTPATIENT
Start: 2022-06-08 | End: 2022-06-08

## 2022-06-08 RX ORDER — ACETAMINOPHEN 500 MG
975 TABLET ORAL ONCE
Refills: 0 | Status: DISCONTINUED | OUTPATIENT
Start: 2022-06-08 | End: 2022-06-22

## 2022-06-08 RX ORDER — VITAMIN E 100 UNIT
1 CAPSULE ORAL
Qty: 0 | Refills: 0 | DISCHARGE

## 2022-06-08 RX ORDER — CHOLECALCIFEROL (VITAMIN D3) 125 MCG
1 CAPSULE ORAL
Qty: 0 | Refills: 0 | DISCHARGE

## 2022-06-08 RX ORDER — PREGABALIN 225 MG/1
1 CAPSULE ORAL
Qty: 0 | Refills: 0 | DISCHARGE

## 2022-06-08 RX ORDER — LIDOCAINE HCL 20 MG/ML
0.2 VIAL (ML) INJECTION ONCE
Refills: 0 | Status: DISCONTINUED | OUTPATIENT
Start: 2022-06-08 | End: 2022-06-08

## 2022-06-08 RX ORDER — INFLUENZA VIRUS VACCINE 15; 15; 15; 15 UG/.5ML; UG/.5ML; UG/.5ML; UG/.5ML
0.7 SUSPENSION INTRAMUSCULAR ONCE
Refills: 0 | Status: DISCONTINUED | OUTPATIENT
Start: 2022-06-08 | End: 2022-06-22

## 2022-06-08 RX ORDER — CARVEDILOL PHOSPHATE 80 MG/1
1 CAPSULE, EXTENDED RELEASE ORAL
Qty: 0 | Refills: 0 | DISCHARGE

## 2022-06-08 RX ORDER — SODIUM CHLORIDE 9 MG/ML
3 INJECTION INTRAMUSCULAR; INTRAVENOUS; SUBCUTANEOUS EVERY 8 HOURS
Refills: 0 | Status: DISCONTINUED | OUTPATIENT
Start: 2022-06-08 | End: 2022-06-08

## 2022-06-08 RX ORDER — L.ACIDOPH/B.ANIMALIS/B.LONGUM 15B CELL
1 CAPSULE ORAL
Qty: 0 | Refills: 0 | DISCHARGE

## 2022-06-08 RX ORDER — FLUTICASONE PROPIONATE 0.5 MG/G
1 CREAM TOPICAL
Qty: 0 | Refills: 0 | DISCHARGE

## 2022-06-08 RX ADMIN — ONDANSETRON 4 MILLIGRAM(S): 8 TABLET, FILM COATED ORAL at 13:04

## 2022-06-08 RX ADMIN — FENTANYL CITRATE 25 MICROGRAM(S): 50 INJECTION INTRAVENOUS at 13:25

## 2022-06-08 RX ADMIN — FENTANYL CITRATE 25 MICROGRAM(S): 50 INJECTION INTRAVENOUS at 13:40

## 2022-06-08 NOTE — PATIENT PROFILE ADULT - SURGICAL SITE INCISION
From [facility] for now oxygen Sat. 85% on 2L and 95% on 5L NC pt is bed bound peg in place/no feeding done today as per family.
no

## 2022-06-08 NOTE — ASU DISCHARGE PLAN (ADULT/PEDIATRIC) - CARE PROVIDER_API CALL
Ector Murrieta)  Urology  [facility] - Dept of Urology, [address] [place] [address]  Phone: [phone]  Fax: [phone]  Established Patient  Follow Up Time: 1 week

## 2022-06-08 NOTE — PATIENT PROFILE ADULT - FALL HARM RISK - UNIVERSAL INTERVENTIONS
Bed in lowest position, wheels locked, appropriate side rails in place/Call bell, personal items and telephone in reach/Instruct patient to call for assistance before getting out of bed or chair/Non-slip footwear when patient is out of bed/[place] to call system/Physically safe environment - no spills, clutter or unnecessary equipment/Purposeful Proactive Rounding/Room/bathroom lighting operational, light cord in reach

## 2022-06-08 NOTE — ASU DISCHARGE PLAN (ADULT/PEDIATRIC) - NS MD DC FALL RISK RISK
For information on Fall & Injury Prevention, visit: [email or web address] OR  [email or web address] OR  https://www.cdc.gov/steadi/patient.html

## 2022-06-09 DIAGNOSIS — T83.84XD: ICD-10-CM

## 2022-06-09 RX ORDER — OXYCODONE 5 MG/1
5 TABLET ORAL
Qty: 6 | Refills: 0 | Status: ACTIVE | COMMUNITY
Start: 2022-06-09 | End: 1900-01-01

## 2022-06-13 ENCOUNTER — NON-APPOINTMENT (OUTPATIENT)
Age: 67
End: 2022-06-13

## 2022-06-14 ENCOUNTER — NON-APPOINTMENT (OUTPATIENT)
Age: 67
End: 2022-06-14

## 2022-06-14 LAB — NIDUS STONE QN: SIGNIFICANT CHANGE UP

## 2022-06-14 RX ORDER — OXYCODONE AND ACETAMINOPHEN 5; 325 MG/1; MG/1
5-325 TABLET ORAL
Qty: 10 | Refills: 0 | Status: ACTIVE | COMMUNITY
Start: 2022-06-14 | End: 1900-01-01

## 2022-06-15 LAB — SURGICAL PATHOLOGY STUDY: SIGNIFICANT CHANGE UP

## 2022-06-16 ENCOUNTER — APPOINTMENT (OUTPATIENT)
Dept: UROLOGY | Facility: CLINIC | Age: 67
End: 2022-06-16
Payer: MEDICARE

## 2022-06-16 ENCOUNTER — OUTPATIENT (OUTPATIENT)
Dept: OUTPATIENT SERVICES | Facility: HOSPITAL | Age: 67
LOS: 1 days | End: 2022-06-16
Payer: COMMERCIAL

## 2022-06-16 VITALS
DIASTOLIC BLOOD PRESSURE: 83 MMHG | SYSTOLIC BLOOD PRESSURE: 132 MMHG | HEART RATE: 91 BPM | RESPIRATION RATE: 17 BRPM | OXYGEN SATURATION: 98 %

## 2022-06-16 DIAGNOSIS — N20.0 CALCULUS OF KIDNEY: ICD-10-CM

## 2022-06-16 DIAGNOSIS — Z98.890 OTHER SPECIFIED POSTPROCEDURAL STATES: Chronic | ICD-10-CM

## 2022-06-16 DIAGNOSIS — Z87.442 PERSONAL HISTORY OF URINARY CALCULI: Chronic | ICD-10-CM

## 2022-06-16 DIAGNOSIS — Z96.642 PRESENCE OF LEFT ARTIFICIAL HIP JOINT: Chronic | ICD-10-CM

## 2022-06-16 DIAGNOSIS — Z98.89 OTHER SPECIFIED POSTPROCEDURAL STATES: Chronic | ICD-10-CM

## 2022-06-16 DIAGNOSIS — Z90.89 ACQUIRED ABSENCE OF OTHER ORGANS: Chronic | ICD-10-CM

## 2022-06-16 DIAGNOSIS — R21 RASH AND OTHER NONSPECIFIC SKIN ERUPTION: ICD-10-CM

## 2022-06-16 DIAGNOSIS — N32.81 OVERACTIVE BLADDER: Chronic | ICD-10-CM

## 2022-06-16 DIAGNOSIS — Z90.710 ACQUIRED ABSENCE OF BOTH CERVIX AND UTERUS: Chronic | ICD-10-CM

## 2022-06-16 DIAGNOSIS — Z96.641 PRESENCE OF RIGHT ARTIFICIAL HIP JOINT: Chronic | ICD-10-CM

## 2022-06-16 DIAGNOSIS — R35.0 FREQUENCY OF MICTURITION: ICD-10-CM

## 2022-06-16 DIAGNOSIS — Z98.49 CATARACT EXTRACTION STATUS, UNSPECIFIED EYE: Chronic | ICD-10-CM

## 2022-06-16 PROCEDURE — 52310 CYSTOSCOPY AND TREATMENT: CPT

## 2022-06-17 PROBLEM — R21 RASH OF GENITALIA: Status: ACTIVE | Noted: 2022-06-17

## 2022-06-20 ENCOUNTER — APPOINTMENT (OUTPATIENT)
Dept: UROLOGY | Facility: CLINIC | Age: 67
End: 2022-06-20

## 2022-06-24 ENCOUNTER — APPOINTMENT (OUTPATIENT)
Dept: GASTROENTEROLOGY | Facility: CLINIC | Age: 67
End: 2022-06-24
Payer: MEDICARE

## 2022-06-24 VITALS
BODY MASS INDEX: 53.92 KG/M2 | HEIGHT: 62 IN | WEIGHT: 293 LBS | HEART RATE: 91 BPM | TEMPERATURE: 98 F | SYSTOLIC BLOOD PRESSURE: 130 MMHG | OXYGEN SATURATION: 96 % | RESPIRATION RATE: 17 BRPM | DIASTOLIC BLOOD PRESSURE: 80 MMHG

## 2022-06-24 DIAGNOSIS — K58.9 IRRITABLE BOWEL SYNDROME W/OUT DIARRHEA: ICD-10-CM

## 2022-06-24 DIAGNOSIS — U07.1 COVID-19: ICD-10-CM

## 2022-06-24 PROCEDURE — 99204 OFFICE O/P NEW MOD 45 MIN: CPT

## 2022-06-24 NOTE — ASSESSMENT
[FreeTextEntry1] : Impression:\par \par #1 diarrhea predominant IBS–chronic symptoms of intermittent lower abdominal crampy pain in association with diarrhea.  Functional etiology of symptoms attributed to diarrhea predominant IBS suspected.  Associated dietary component from lactose intolerance or other dietary culprits likely contributory.  Also suspect medications may be contributory (such as from Ozempic and possibly others).  Addition, possible component of bile acid diarrhea related to prior cholecystectomy may also warrant assessment.  Of note, patient indicates possible history "Crohn's and colitis"–await records for review and confirmation although I am skeptical patient has IBD.\par \par #2 dyspepsia–associated with nausea/nonbloody vomiting–intermittent episodes of epigastric and subxiphoid discomfort with episodic nausea and nonbloody emesis.  Possible consequence of prior laparoscopic gastric band placement and subsequent removal.  Associated component functional dyspepsia considered.\par \par #3 obesity–BMI 53.77.\par \par #4 status post laparoscopic cholecystectomy–possible history gallstone pancreatitis.\par \par #5 diverticulosis (per patient).\par \par #6 history colonic polyps.\par \par General medical problems:\par \par -Hypertension.\par \par -Overactive bladder status post InterStim device.\par \par -Osteoarthritis status post bilateral THR.  Chronic knee pain.\par \par -History uterine fibroids status post uterine myomectomy.\par \par -Status post NICK/BSO (per patient–possible "precancer").\par \par -Gout.\par \par -History kidney stones status both cystoscopic removal as well as percutaneous nephrostomy for kidney stone removal.\par \par -History anemia.\par \par -Obstructive sleep apnea (apparently mild–does not require CPAP or other device).

## 2022-06-24 NOTE — REVIEW OF SYSTEMS
[SOB on Exertion] : shortness of breath during exertion [As Noted in HPI] : as noted in HPI [Arthralgias] : arthralgias [Anxiety] : anxiety [Depression] : depression [Negative] : Heme/Lymph [FreeTextEntry3] : History cataracts. [FreeTextEntry8] : Urinary frequency.

## 2022-06-24 NOTE — REASON FOR VISIT
[Consultation] : a consultation visit [FreeTextEntry1] : for evaluation of chronic abdominal pain, nausea, vomiting and diarrhea.

## 2022-06-24 NOTE — HISTORY OF PRESENT ILLNESS
[FreeTextEntry1] : Office consultation on 6/24/2022.\par \par The patient is a 67-year-old woman evaluated for consultation regarding complaints of chronic abdominal pain, nausea, vomiting and chronic diarrhea.  PMD: Dr. Erik Dela Cruz.\par \par History per patient.  Await receipt of records for review.\par \par Patient indicates current GI symptoms mostly following laparoscopic gastric band removal in 2017.  Patient indicates history of obesity.  Underwent laparoscopic gastric band insertion in 2008.  Baseline weight was 300 pounds.  Following laparoscopic gastric band placement lost 50 pounds.\par \par Describes scenario of sense of retrosternal bolus hang up with eating following laparoscopic gastric band placement.  Was not satisfied with result and wanted gastric sleeve operation.  Underwent reoperation 2017 for laparoscopic gastric band removal.  Describes somewhat technically complicated operative course with  adhesions and other issues (per patient await prior records including operative report for review).  Was advised that gastric sleeve was not recommended based on these issues.\par \par Chronic GI symptoms since laparoscopic gastric band removal.  However, suspect some symptoms preceded this.\par \par Experiencing intermittent nausea and episodic vomiting over the past 2 years.  Indicates that "heavy food" and occasionally medications can be provocative.  Symptoms are intermittent and episodic.  Describes approximately 3 times per month may have episodes of nausea and nonbloody vomiting.\par \par Experiencing chronic abdominal pain over the past 2 to 3 years.  Has 2 different types of abdominal pain:\par -Dominant complaint is a lower abdominal crampy "labor like" intermittent pain.  Last 5 to 10 minutes.  Precipitating factors such as any relationship to eating, particular food or bowel movement not reported.  Stress perhaps provocative.  Does get relief with hyoscyamine as needed.  Can also experience this with associated left lower quadrant abdominal cramps.\par -Experiencing a separate epigastric and subxiphoid pain described more as a soreness.  Sometimes noted after vomiting.\par \par Experiences intermittent diarrhea.  Somewhat improved at the current time.  This apparently has been for the last several years.  She indicates that 1 year ago she was having 10 diarrheal stools daily.  Apparently much better now as she now describes 1 soft Deadwood 5 or Deadwood 6 bowel movement daily.  Denies rectal bleeding.  Last episode of loose diarrheal Deadwood 7 stool was 2 weeks ago.\par \par Patient indicates being told of irritable bowel syndrome, lactose intolerance and possibly Crohn's disease or some type of colitis.  Treated with hyoscyamine as needed for abdominal cramps and Lomotil as needed for diarrhea.  Not on any specific therapy for IBD.\par \par Patient had colonoscopy and upper endoscopy 1 year ago.  As noted, history was exclusive for from patient and await receipt of records including endoscopic evaluation, laboratory testing and any imaging.\par \par Describes previous history of gallstones and may have had an episode of gallstone pancreatitis.  Previously underwent laparoscopic cholecystectomy.  Does not describe needing ERCP.  Believes colonic polyps removed at prior colonoscopy.  Was told of having diverticulosis.  Patient reports no other past history of digestive illness except as noted.  Family history of colon cancer is not reported.\par \par Medical comorbidity noted for hypertension, obesity, overactive bladder (status post InterStim insertion), gout, obstructive sleep apnea (mild) and kidney stones.\par \par Current diet erratic and inconsistent.:\par -Breakfast either skips it or has a protein shake.\par -Lunch skips.\par -Dinner–typically veggie burger or something like that.  Mostly 1 meal daily.

## 2022-06-24 NOTE — PHYSICAL EXAM
[General Appearance - Alert] : alert [General Appearance - In No Acute Distress] : in no acute distress [General Appearance - Well Developed] : well developed [General Appearance - Well-Appearing] : healthy appearing [Sclera] : the sclera and conjunctiva were normal [Neck Appearance] : the appearance of the neck was normal [Neck Cervical Mass (___cm)] : no neck mass was observed [Respiration, Rhythm And Depth] : normal respiratory rhythm and effort [Exaggerated Use Of Accessory Muscles For Inspiration] : no accessory muscle use [Auscultation Breath Sounds / Voice Sounds] : lungs were clear to auscultation bilaterally [Heart Rate And Rhythm] : heart rate was normal and rhythm regular [Heart Sounds] : normal S1 and S2 [Heart Sounds Gallop] : no gallops [Murmurs] : no murmurs [Heart Sounds Pericardial Friction Rub] : no pericardial rub [Edema] : there was no peripheral edema [Bowel Sounds] : normal bowel sounds [Abdomen Soft] : soft [Abdomen Tenderness] : non-tender [] : no hepato-splenomegaly [Abdomen Mass (___ Cm)] : no abdominal mass palpated [Abdomen Hernia] : no hernia was discovered [Normal Sphincter Tone] : normal sphincter tone [No Rectal Mass] : no rectal mass [Cervical Lymph Nodes Enlarged Posterior Bilaterally] : posterior cervical [Cervical Lymph Nodes Enlarged Anterior Bilaterally] : anterior cervical [No CVA Tenderness] : no ~M costovertebral angle tenderness [Abnormal Walk] : normal gait [Nail Clubbing] : no clubbing  or cyanosis of the fingernails [Skin Color & Pigmentation] : normal skin color and pigmentation [Oriented To Time, Place, And Person] : oriented to person, place, and time [Impaired Insight] : insight and judgment were intact [Affect] : the affect was normal [Mood] : the mood was normal [FreeTextEntry1] : No perianal pain or tenderness.  Normal sphincter tone.  No mass.  Blood not detected.  Of note, InterStim device noted at right buttocks subcutaneously.

## 2022-06-24 NOTE — CONSULT LETTER
[Dear  ___] : Dear  [unfilled], [Consult Letter:] : I had the pleasure of evaluating your patient, [unfilled]. [( Thank you for referring [unfilled] for consultation for _____ )] : Thank you for referring [unfilled] for consultation for [unfilled] [Please see my note below.] : Please see my note below. [Consult Closing:] : Thank you very much for allowing me to participate in the care of this patient.  If you have any questions, please do not hesitate to contact me. [Sincerely,] : Sincerely, [FreeTextEntry2] : Dr. Erik Dela Cruz [FreeTextEntry3] : Rehan Garcia MD

## 2022-07-11 ENCOUNTER — APPOINTMENT (OUTPATIENT)
Dept: BARIATRICS/WEIGHT MGMT | Facility: CLINIC | Age: 67
End: 2022-07-11

## 2022-07-11 PROCEDURE — 99214 OFFICE O/P EST MOD 30 MIN: CPT | Mod: 95

## 2022-07-13 NOTE — ASSESSMENT
[FreeTextEntry1] : Bariatric Surgery History: S/p Lap band placement and removal\par \par Obesity Co-Morbidities: HTN, HLD, DM2\par \par Anti-Obesity Medications: Phentermine\par \par Obesity Medication Side Effects: nephrolithiasis on topiramate\par \par Plan: \par \par Discussed whole food concept. Avoid any added fat, sugar, refined carbohydrate\par Due to GI symptoms, high fiber may be difficult\par advised she cook her vegetables\par keep a food journal and document gi symptoms- will be trial and error \par recommend at least 2 meals a day \par will renew phentermine 30mg po daily \par \par f/u 3-4 weeks \par

## 2022-07-13 NOTE — HISTORY OF PRESENT ILLNESS
[Home] : at home, [unfilled] , at the time of the visit. [Other Location: e.g. Home (Enter Location, City,State)___] : at [unfilled] [Verbal consent obtained from patient] : the patient, [unfilled] [FreeTextEntry1] : This is a 67 year old female being seen obesity followup visit. \par \par  Had kidney stone surgery in June\par Reports chronic GI symptoms- seeing GI \par States she is gets really bad abdominal pain/attacks\par \par Patient unsure of weight \par She was on phentermine for 1 month and ran out. Patient is present today to go over nutrition   \par \par Patient states she can only get 1 meal in a day\par Having trouble finding food that does not hurt her stomach\par She was able to tolerate turkey burger but she has issues with most vegetables \par \par

## 2022-07-17 NOTE — H&P PST ADULT - PROBLEM SELECTOR PLAN 1
THE PATIENT WAS SEEN AND EXAMINED BY ME WITH THE HOUSESTAFF AND STROKE TEAM DURING MORNING ROUNDS.   HPI:  83 y.o. M with PMH of thyroid ca s/p thyroidectomy in 2018, hyperthyroid, HTN, TIA (15-20 years ago), alzheimer's disease, and depression presented to [facility] ED due to L facial droop, L sided weakness and slurring of speech. LKN 22:00 hr on 7/15. Was asked by wife to take out trash and came back into the house ~22:15, started to have sx. Takes aspirin at home. Denies any toxic habits. No recent illnesses. Denies any headache, chest pain, numbness/tingling throughout the extremities.     NIHSS 11 for does not know month and age, L facial palsy, LUE some effort, LLE drift, mild aphasia, severe dysarthria  mRS 0 (15 Jul 2022 23:45)      SUBJECTIVE: restless overnight with difficulty sleeping.  No new neurologic complaints.      MEDICATIONS  (STANDING):  amLODIPine   Tablet 5 milliGRAM(s) Oral daily  aspirin enteric coated 81 milliGRAM(s) Oral daily  atorvastatin 80 milliGRAM(s) Oral at bedtime  chlorhexidine 2% Cloths 1 Application(s) Topical daily  donepezil 10 milliGRAM(s) Oral at bedtime  enoxaparin Injectable 40 milliGRAM(s) SubCutaneous every 24 hours  levothyroxine 100 MICROGram(s) Oral daily  memantine 10 milliGRAM(s) Oral two times a day  mirtazapine 7.5 milliGRAM(s) Oral at bedtime  niCARdipine Infusion 5 mG/Hr (25 mL/Hr) IV Continuous <Continuous>  QUEtiapine 25 milliGRAM(s) Oral at bedtime  sodium chloride 0.9%. 1000 milliLiter(s) (50 mL/Hr) IV Continuous <Continuous>    PHYSICAL EXAM:   Vital Signs Last 24 Hrs  T(C): 36.8 (17 Jul 2022 08:00), Max: 36.9 (16 Jul 2022 19:00)  T(F): 98.3 (17 Jul 2022 08:00), Max: 98.4 (16 Jul 2022 19:00)  HR: 76 (17 Jul 2022 10:00) (59 - 89)  BP: 169/85 (17 Jul 2022 10:00) (106/71 - 178/90)  BP(mean): 110 (17 Jul 2022 10:00) (74 - 113)  RR: 21 (17 Jul 2022 10:00) (12 - 27)  SpO2: 98% (17 Jul 2022 10:00) (95% - 100%)    Parameters below as of 17 Jul 2022 06:00  Patient On (Oxygen Delivery Method): room air        General: No acute distress  HEENT: EOM intact, visual fields full  Abdomen: Soft, nontender, nondistended   Extremities: No edema    NEUROLOGICAL EXAM:  Mental status: Awake, alert, oriented to person only, follows simple commands inconsistently, mild aphasia,   Cranial Nerves: flattening of left NLF, no nystagmus, mod to severe dysarthria,  tongue midline  Motor exam: Normal tone, no drift, 4/5 RUE, 5/5 RLE, 4/5 LUE, 4/5 LLE, normal fine finger movements.  Sensation: Intact to light touch   Coordination/ Gait: + dysmetria LUE, gait not assessed    LABS:                        11.9   5.49  )-----------( 116      ( 17 Jul 2022 07:45 )             37.1    07-17    141  |  103  |  38<H>  ----------------------------<  105<H>  4.0   |  23  |  1.27    Ca    9.3      17 Jul 2022 09:14    TPro  7.8  /  Alb  4.7  /  TBili  0.5  /  DBili  x   /  AST  33  /  ALT  25  /  AlkPhos  91  07-15  PT/INR - ( 15 Jul 2022 22:56 )   PT: 11.7 sec;   INR: 1.01 ratio         PTT - ( 15 Jul 2022 22:56 )  PTT:31.3 sec      IMAGING: Reviewed by me.     CT BRAIN 07.16.22  Developing acute infarct in the right middle cerebral artery vascular   territory, involving the ventral aspect of the right frontoparietal   convexity.  No hemorrhagic conversion.    CTA Neck and brain 07.15.22  No proximal large vessel intracranial occlusion.   Atherosclerotic calcifications contributing to moderate luminal stenosis   of the proximal left internal carotid artery by NASCET criteria.    CT Perfusion 07.15.22   No core infarct identified. Penumbra of 10 mL localized to   the right MCA territory.    CT BRAIN 07.15.22  No acute intracranial hemorrhage, mass effect, or midline shift. If   clinical symptoms persist or worsen, more sensitive evaluation with brain   MRI may be obtained, if no contraindications exist.         scheduled laparoscopic removal of lap band and sleeve gastrectomy on 3/23/17  obtaining medical clearance  pre op instructions discussed

## 2022-07-22 ENCOUNTER — NON-APPOINTMENT (OUTPATIENT)
Age: 67
End: 2022-07-22

## 2022-07-31 ENCOUNTER — RX RENEWAL (OUTPATIENT)
Age: 67
End: 2022-07-31

## 2022-07-31 RX ORDER — CLOTRIMAZOLE 10 MG/G
1 CREAM TOPICAL 3 TIMES DAILY
Qty: 15 | Refills: 0 | Status: ACTIVE | COMMUNITY
Start: 2022-06-17 | End: 1900-01-01

## 2022-08-05 ENCOUNTER — NON-APPOINTMENT (OUTPATIENT)
Age: 67
End: 2022-08-05

## 2022-08-08 ENCOUNTER — APPOINTMENT (OUTPATIENT)
Dept: UROLOGY | Facility: CLINIC | Age: 67
End: 2022-08-08

## 2022-08-08 ENCOUNTER — OUTPATIENT (OUTPATIENT)
Dept: OUTPATIENT SERVICES | Facility: HOSPITAL | Age: 67
LOS: 1 days | End: 2022-08-08
Payer: COMMERCIAL

## 2022-08-08 VITALS
DIASTOLIC BLOOD PRESSURE: 86 MMHG | TEMPERATURE: 97.5 F | OXYGEN SATURATION: 98 % | RESPIRATION RATE: 16 BRPM | HEART RATE: 80 BPM | SYSTOLIC BLOOD PRESSURE: 149 MMHG

## 2022-08-08 DIAGNOSIS — Z98.89 OTHER SPECIFIED POSTPROCEDURAL STATES: Chronic | ICD-10-CM

## 2022-08-08 DIAGNOSIS — Z87.442 PERSONAL HISTORY OF URINARY CALCULI: Chronic | ICD-10-CM

## 2022-08-08 DIAGNOSIS — R35.0 FREQUENCY OF MICTURITION: ICD-10-CM

## 2022-08-08 DIAGNOSIS — N32.81 OVERACTIVE BLADDER: Chronic | ICD-10-CM

## 2022-08-08 DIAGNOSIS — Z98.890 OTHER SPECIFIED POSTPROCEDURAL STATES: Chronic | ICD-10-CM

## 2022-08-08 DIAGNOSIS — Z98.49 CATARACT EXTRACTION STATUS, UNSPECIFIED EYE: Chronic | ICD-10-CM

## 2022-08-08 DIAGNOSIS — Z96.641 PRESENCE OF RIGHT ARTIFICIAL HIP JOINT: Chronic | ICD-10-CM

## 2022-08-08 DIAGNOSIS — Z90.710 ACQUIRED ABSENCE OF BOTH CERVIX AND UTERUS: Chronic | ICD-10-CM

## 2022-08-08 DIAGNOSIS — Z90.89 ACQUIRED ABSENCE OF OTHER ORGANS: Chronic | ICD-10-CM

## 2022-08-08 DIAGNOSIS — Z96.642 PRESENCE OF LEFT ARTIFICIAL HIP JOINT: Chronic | ICD-10-CM

## 2022-08-08 PROCEDURE — 52287 CYSTOSCOPY CHEMODENERVATION: CPT

## 2022-08-08 RX ORDER — ONABOTULINUMTOXINA 100 [USP'U]/1
100 INJECTION, POWDER, LYOPHILIZED, FOR SOLUTION INTRADERMAL; INTRAMUSCULAR
Qty: 1 | Refills: 0 | Status: COMPLETED | OUTPATIENT
Start: 2022-08-08 | End: 2022-08-08

## 2022-08-08 RX ORDER — ONABOTULINUMTOXINA 100 [USP'U]/1
100 INJECTION, POWDER, LYOPHILIZED, FOR SOLUTION INTRADERMAL; INTRAMUSCULAR
Qty: 1 | Refills: 0 | Status: COMPLETED | OUTPATIENT
Start: 2022-08-08

## 2022-08-08 RX ADMIN — ONABOTULINUMTOXINA 100 UNIT: 100 INJECTION, POWDER, LYOPHILIZED, FOR SOLUTION INTRADERMAL; INTRAMUSCULAR at 00:00

## 2022-08-10 ENCOUNTER — NON-APPOINTMENT (OUTPATIENT)
Age: 67
End: 2022-08-10

## 2022-08-11 ENCOUNTER — OUTPATIENT (OUTPATIENT)
Dept: OUTPATIENT SERVICES | Facility: HOSPITAL | Age: 67
LOS: 1 days | End: 2022-08-11
Payer: COMMERCIAL

## 2022-08-11 ENCOUNTER — NON-APPOINTMENT (OUTPATIENT)
Age: 67
End: 2022-08-11

## 2022-08-11 ENCOUNTER — APPOINTMENT (OUTPATIENT)
Dept: UROLOGY | Facility: CLINIC | Age: 67
End: 2022-08-11

## 2022-08-11 VITALS
DIASTOLIC BLOOD PRESSURE: 97 MMHG | HEART RATE: 99 BPM | HEIGHT: 62 IN | WEIGHT: 293 LBS | RESPIRATION RATE: 18 BRPM | TEMPERATURE: 98.1 F | SYSTOLIC BLOOD PRESSURE: 150 MMHG | BODY MASS INDEX: 53.92 KG/M2

## 2022-08-11 DIAGNOSIS — Z96.642 PRESENCE OF LEFT ARTIFICIAL HIP JOINT: Chronic | ICD-10-CM

## 2022-08-11 DIAGNOSIS — N20.0 CALCULUS OF KIDNEY: ICD-10-CM

## 2022-08-11 DIAGNOSIS — Z90.89 ACQUIRED ABSENCE OF OTHER ORGANS: Chronic | ICD-10-CM

## 2022-08-11 DIAGNOSIS — Z98.89 OTHER SPECIFIED POSTPROCEDURAL STATES: Chronic | ICD-10-CM

## 2022-08-11 DIAGNOSIS — Z87.442 PERSONAL HISTORY OF URINARY CALCULI: Chronic | ICD-10-CM

## 2022-08-11 DIAGNOSIS — Z90.710 ACQUIRED ABSENCE OF BOTH CERVIX AND UTERUS: Chronic | ICD-10-CM

## 2022-08-11 DIAGNOSIS — Z96.641 PRESENCE OF RIGHT ARTIFICIAL HIP JOINT: Chronic | ICD-10-CM

## 2022-08-11 DIAGNOSIS — N32.81 OVERACTIVE BLADDER: Chronic | ICD-10-CM

## 2022-08-11 DIAGNOSIS — Z98.890 OTHER SPECIFIED POSTPROCEDURAL STATES: Chronic | ICD-10-CM

## 2022-08-11 DIAGNOSIS — Z98.49 CATARACT EXTRACTION STATUS, UNSPECIFIED EYE: Chronic | ICD-10-CM

## 2022-08-11 PROCEDURE — 76775 US EXAM ABDO BACK WALL LIM: CPT

## 2022-08-11 PROCEDURE — 99213 OFFICE O/P EST LOW 20 MIN: CPT

## 2022-08-11 NOTE — HISTORY OF PRESENT ILLNESS
[FreeTextEntry1] : : 1955 \par Referring Provider: none \par \par HPI: Ms. KAREN PETERSON is a 67 year yo F with a PMHx notable for kidney stones previously for 7 mm in the R kidney. She underwent URS and is here today for follow up. \par \par She also reports she has colitis that is being treated with Budesonide. She has troubles with diarrhea as a result of her colitis. Her last colonoscopy was during the summer of . \par \par Her Litholink from 21 showed low urine output, low citrate, SS CaOx, and SS UA. Recs include increasing urine volume, adding citrate (borderline 460s) with lemon water supplementation and decreasing Shabbir. \par \par ULS today with no stones in L side, two collections echogenic ~5 mm on  R side in MP and LP. Plan for CT to further characterize.\par \par :\par ULS with with R sided 5 mm collection noted and confirmed on CT. Plan for R URS/LL/stent. All r/b/a discussed. \par \par :\par Here today s/p bladder botox with Dr. Xie; Litholink reviewed today with UVol with mildly low 1.38, SS CaOx 6 which is improved. UCa 168, Shabbir improved. Recs: improve water intake >2L (does have OAB), continue K cit 1 tab BID. Here today after URS and stones removed. 90% COM And 10% CP.  [Urinary Incontinence] : no urinary incontinence [Urinary Retention] : no urinary retention [Urinary Urgency] : no urinary urgency [Urinary Frequency] : no urinary frequency

## 2022-08-11 NOTE — PHYSICAL EXAM
[General Appearance - Well Developed] : well developed [General Appearance - Well Nourished] : well nourished [Bowel Sounds] : normal bowel sounds [Abdomen Soft] : soft [FreeTextEntry1] : rash resolved [Skin Color & Pigmentation] : normal skin color and pigmentation [Skin Turgor] : supple [Heart Rate And Rhythm] : Heart rate and rhythm were normal [] : no respiratory distress [Respiration, Rhythm And Depth] : normal respiratory rhythm and effort [Oriented To Time, Place, And Person] : oriented to person, place, and time [Not Anxious] : not anxious [Normal Station and Gait] : the gait and station were normal for the patient's age [No Focal Deficits] : no focal deficits

## 2022-08-12 DIAGNOSIS — N32.81 OVERACTIVE BLADDER: ICD-10-CM

## 2022-08-13 ENCOUNTER — TRANSCRIPTION ENCOUNTER (OUTPATIENT)
Age: 67
End: 2022-08-13

## 2022-08-16 ENCOUNTER — APPOINTMENT (OUTPATIENT)
Dept: BARIATRICS/WEIGHT MGMT | Facility: CLINIC | Age: 67
End: 2022-08-16

## 2022-08-16 PROCEDURE — 99214 OFFICE O/P EST MOD 30 MIN: CPT | Mod: 95

## 2022-08-16 NOTE — ASSESSMENT
[FreeTextEntry1] : Bariatric Surgery History: S/p Lap band placement and removal\par \par Obesity Co-Morbidities: HTN, HLD, DM2\par \par Anti-Obesity Medications: Phentermine\par \par Obesity Medication Side Effects: nephrolithiasis on topiramate, GI side effects with ozempic?\par \par Plan: \par \par Discussed whole food concept. Avoid any added fat, sugar, refined carbohydrate\par Due to GI symptoms, high fiber may be difficult\par advised she cook her vegetables\par keep a food journal and document gi symptoms- will be trial and error \par recommend at least 2 meals a day \par will renew phentermine 30mg, may consider restarted glp-1 based on current GI status \par will repeat blood work at next visit \par \par f/u 3-4 weeks \par

## 2022-08-18 NOTE — REVIEW OF SYSTEMS
DCE would like patient to see NP in 1 to 2 weeks. He likes the nurse practitioner to see the patient after the cath. [Constipation] : no constipation [Diarrhea] : diarrhea [see HPI] : see HPI [Dysuria] : no dysuria [Incontinence] : incontinence [Pelvic Pain] : no pelvic pain [Vaginal Discharge] : no vaginal discharge [Negative] : Heme/Lymph

## 2022-08-22 ENCOUNTER — NON-APPOINTMENT (OUTPATIENT)
Age: 67
End: 2022-08-22

## 2022-08-23 ENCOUNTER — NON-APPOINTMENT (OUTPATIENT)
Age: 67
End: 2022-08-23

## 2022-08-23 RX ORDER — PHENTERMINE HYDROCHLORIDE 30 MG/1
30 CAPSULE ORAL
Qty: 30 | Refills: 0 | Status: ACTIVE | COMMUNITY
Start: 2019-08-19 | End: 1900-01-01

## 2022-08-26 ENCOUNTER — OUTPATIENT (OUTPATIENT)
Dept: OUTPATIENT SERVICES | Facility: HOSPITAL | Age: 67
LOS: 1 days | End: 2022-08-26
Payer: COMMERCIAL

## 2022-08-26 ENCOUNTER — APPOINTMENT (OUTPATIENT)
Dept: UROLOGY | Facility: CLINIC | Age: 67
End: 2022-08-26

## 2022-08-26 VITALS — DIASTOLIC BLOOD PRESSURE: 83 MMHG | SYSTOLIC BLOOD PRESSURE: 143 MMHG

## 2022-08-26 DIAGNOSIS — Z98.89 OTHER SPECIFIED POSTPROCEDURAL STATES: Chronic | ICD-10-CM

## 2022-08-26 DIAGNOSIS — N32.81 OVERACTIVE BLADDER: Chronic | ICD-10-CM

## 2022-08-26 DIAGNOSIS — Z90.89 ACQUIRED ABSENCE OF OTHER ORGANS: Chronic | ICD-10-CM

## 2022-08-26 DIAGNOSIS — Z98.49 CATARACT EXTRACTION STATUS, UNSPECIFIED EYE: Chronic | ICD-10-CM

## 2022-08-26 DIAGNOSIS — Z92.29 PERSONAL HISTORY OF OTHER DRUG THERAPY: ICD-10-CM

## 2022-08-26 DIAGNOSIS — Z96.641 PRESENCE OF RIGHT ARTIFICIAL HIP JOINT: Chronic | ICD-10-CM

## 2022-08-26 DIAGNOSIS — Z96.82 PRESENCE OF NEUROSTIMULATOR: ICD-10-CM

## 2022-08-26 DIAGNOSIS — Z98.890 OTHER SPECIFIED POSTPROCEDURAL STATES: Chronic | ICD-10-CM

## 2022-08-26 DIAGNOSIS — Z87.442 PERSONAL HISTORY OF URINARY CALCULI: Chronic | ICD-10-CM

## 2022-08-26 DIAGNOSIS — Z96.642 PRESENCE OF LEFT ARTIFICIAL HIP JOINT: Chronic | ICD-10-CM

## 2022-08-26 DIAGNOSIS — Z90.710 ACQUIRED ABSENCE OF BOTH CERVIX AND UTERUS: Chronic | ICD-10-CM

## 2022-08-26 PROCEDURE — 51798 US URINE CAPACITY MEASURE: CPT

## 2022-08-26 PROCEDURE — 95972 ALYS CPLX SP/PN NPGT W/PRGRM: CPT

## 2022-08-26 PROCEDURE — 99213 OFFICE O/P EST LOW 20 MIN: CPT | Mod: 25

## 2022-08-26 NOTE — ASSESSMENT
[FreeTextEntry1] : - PVR today 10cc\par \par - Instestim interrogated - pt had best sensory and motor reaction at Program 4 with 1.4amp. will stay on current program for 2 weeks and reassess. \par \par - Interstim device battery life 20-30 months & Impedence checked \par \par - RTO PRN or 6m for repeat botox

## 2022-08-26 NOTE — PHYSICAL EXAM
[General Appearance - Well Developed] : well developed [Normal Appearance] : normal appearance [Abdomen Soft] : soft [Edema] : no peripheral edema [] : no respiratory distress [Not Anxious] : not anxious [FreeTextEntry1] : slow gait

## 2022-08-26 NOTE — HISTORY OF PRESENT ILLNESS
[FreeTextEntry1] : 67 yr old female patient presents to office today for s/p bladder botox injection follow up. Pt reports sensation of incomplete bladder emptying during the 2 weeks follow up phone call. \par \par PVR today 10cc. \par \par pt also has interstim placed in Aug 21st, 2018 - reports that she has not being benefiting from the device in term of her urological symptoms.

## 2022-08-29 ENCOUNTER — APPOINTMENT (OUTPATIENT)
Dept: UROLOGY | Facility: CLINIC | Age: 67
End: 2022-08-29

## 2022-08-31 DIAGNOSIS — R35.0 FREQUENCY OF MICTURITION: ICD-10-CM

## 2022-09-06 ENCOUNTER — APPOINTMENT (OUTPATIENT)
Dept: BARIATRICS/WEIGHT MGMT | Facility: CLINIC | Age: 67
End: 2022-09-06

## 2022-09-06 VITALS — HEIGHT: 62 IN | WEIGHT: 286 LBS | BODY MASS INDEX: 52.63 KG/M2

## 2022-09-06 PROCEDURE — 99214 OFFICE O/P EST MOD 30 MIN: CPT | Mod: 95

## 2022-09-10 NOTE — HISTORY OF PRESENT ILLNESS
[Home] : at home, [unfilled] , at the time of the visit. [Other Location: e.g. Home (Enter Location, City,State)___] : at [unfilled] [Verbal consent obtained from patient] : the patient, [unfilled] [FreeTextEntry1] : This is a 67 year old female being seen obesity followup visit. \par \par Weight is down but she has been having GI issues. PCP recommends she sees GI for testing \par She has only been able to tolerate turkey cutlets, vegan cheese and crackers or matza with jelly. Eating once a day, states she is not hungry. If she goes out, she just eats bread because she is afraid to eat \par Drinking water and coconut water \par \par Taking Phentermine 30mg, only eating 1 meal a day\par \par Exercise limited\par \par PCP has ordered blood work, going tomorrow \par \par

## 2022-09-10 NOTE — ASSESSMENT
[FreeTextEntry1] : Bariatric Surgery History: S/p Lap band placement and removal\par \par Obesity Co-Morbidities: HTN, HLD, DM2\par \par Anti-Obesity Medications: Phentermine\par \par Obesity Medication Side Effects: nephrolithiasis on topiramate, GI side effects with ozempic?\par \par Plan: \par \par Discussed whole food concept. Avoid any added fat, sugar, refined carbohydrate\par Due to GI symptoms, high fiber may be difficult\par advised she cook her vegetables\par keep a food journal and document gi symptoms\par f/u with GI\par recommend at least 2 meals a day \par continue phentermine 30mg, will avoid glp-1/metformin due to potential gi side effects\par request copy of blood work \par \par f/u 3-4 weeks \par

## 2022-09-20 ENCOUNTER — APPOINTMENT (OUTPATIENT)
Dept: BARIATRICS/WEIGHT MGMT | Facility: CLINIC | Age: 67
End: 2022-09-20

## 2022-09-20 VITALS
WEIGHT: 286.13 LBS | BODY MASS INDEX: 52.66 KG/M2 | HEART RATE: 79 BPM | SYSTOLIC BLOOD PRESSURE: 136 MMHG | DIASTOLIC BLOOD PRESSURE: 84 MMHG | HEIGHT: 62 IN | OXYGEN SATURATION: 97 %

## 2022-09-20 VITALS — WEIGHT: 286.13 LBS | BODY MASS INDEX: 52.66 KG/M2 | HEIGHT: 62 IN

## 2022-09-20 DIAGNOSIS — R35.0 FREQUENCY OF MICTURITION: ICD-10-CM

## 2022-09-20 DIAGNOSIS — N32.81 OVERACTIVE BLADDER: ICD-10-CM

## 2022-09-20 DIAGNOSIS — Z96.82 PRESENCE OF NEUROSTIMULATOR: ICD-10-CM

## 2022-09-20 DIAGNOSIS — Z92.29 PERSONAL HISTORY OF OTHER DRUG THERAPY: ICD-10-CM

## 2022-09-20 PROCEDURE — 99213 OFFICE O/P EST LOW 20 MIN: CPT

## 2022-09-21 NOTE — HISTORY OF PRESENT ILLNESS
[FreeTextEntry1] : This is a 67 year old woman returns for obesity medicine f/u\par \par She remains on 30 mg of phentermine with adequate appetite restrictions. \par she has been dealing with chronic GI issues, someitmes nausea but also diarrhea that seem to be related to certain foods and stress\par she recently had upper GI series with results pending\par was told she might have irritable bowel syndrome\par some days she only eats one meal/day\par she has lost 8 lbs in past 5 months though it has been stagnant of late.\par hips are doing well though has painful b/l knee arthritis \par \par

## 2022-09-21 NOTE — ASSESSMENT
[FreeTextEntry1] : 66 yo woman with lap-band placement (s/p band removal March 2017), HTN, DM2, HLD, nephrolithiasis, overactive bladder, and chronic GI distress\par \par question of whether she has inflammatory +/- irritable bowel.  \par can consider increasing phentermine to 37.5mg but would like to avoid further appetite restriction until GI workup is complete.\par will need to lose weight prior to b/l knee replacements\par \par consider returning to RD pending GI workup\par \par rtc in 1-2 months.\par

## 2022-09-26 ENCOUNTER — APPOINTMENT (OUTPATIENT)
Dept: BARIATRICS/WEIGHT MGMT | Facility: CLINIC | Age: 67
End: 2022-09-26

## 2022-10-11 NOTE — H&P PST ADULT - TOBACCO USE

## 2022-11-03 ENCOUNTER — APPOINTMENT (OUTPATIENT)
Dept: UROLOGY | Facility: CLINIC | Age: 67
End: 2022-11-03
Payer: MEDICARE

## 2022-11-03 ENCOUNTER — OUTPATIENT (OUTPATIENT)
Dept: OUTPATIENT SERVICES | Facility: HOSPITAL | Age: 67
LOS: 1 days | End: 2022-11-03
Payer: COMMERCIAL

## 2022-11-03 DIAGNOSIS — N32.81 OVERACTIVE BLADDER: ICD-10-CM

## 2022-11-03 DIAGNOSIS — Z87.442 PERSONAL HISTORY OF URINARY CALCULI: Chronic | ICD-10-CM

## 2022-11-03 DIAGNOSIS — Z96.641 PRESENCE OF RIGHT ARTIFICIAL HIP JOINT: Chronic | ICD-10-CM

## 2022-11-03 DIAGNOSIS — Z98.890 OTHER SPECIFIED POSTPROCEDURAL STATES: Chronic | ICD-10-CM

## 2022-11-03 DIAGNOSIS — Z90.710 ACQUIRED ABSENCE OF BOTH CERVIX AND UTERUS: Chronic | ICD-10-CM

## 2022-11-03 DIAGNOSIS — N32.81 OVERACTIVE BLADDER: Chronic | ICD-10-CM

## 2022-11-03 DIAGNOSIS — Z98.49 CATARACT EXTRACTION STATUS, UNSPECIFIED EYE: Chronic | ICD-10-CM

## 2022-11-03 DIAGNOSIS — Z96.642 PRESENCE OF LEFT ARTIFICIAL HIP JOINT: Chronic | ICD-10-CM

## 2022-11-03 DIAGNOSIS — Z98.89 OTHER SPECIFIED POSTPROCEDURAL STATES: Chronic | ICD-10-CM

## 2022-11-03 DIAGNOSIS — Z90.89 ACQUIRED ABSENCE OF OTHER ORGANS: Chronic | ICD-10-CM

## 2022-11-03 DIAGNOSIS — R35.0 FREQUENCY OF MICTURITION: ICD-10-CM

## 2022-11-03 PROCEDURE — 99213 OFFICE O/P EST LOW 20 MIN: CPT

## 2022-11-03 PROCEDURE — 76775 US EXAM ABDO BACK WALL LIM: CPT | Mod: 26

## 2022-11-03 PROCEDURE — 51798 US URINE CAPACITY MEASURE: CPT | Mod: 59

## 2022-11-03 PROCEDURE — 76775 US EXAM ABDO BACK WALL LIM: CPT

## 2022-11-03 RX ORDER — POTASSIUM CITRATE 15 MEQ/1
15 MEQ TABLET, EXTENDED RELEASE ORAL
Qty: 180 | Refills: 3 | Status: ACTIVE | COMMUNITY
Start: 2022-08-13 | End: 1900-01-01

## 2022-11-03 NOTE — HISTORY OF PRESENT ILLNESS
[FreeTextEntry1] : : 1955 \par Referring Provider: none \par \par HPI: Ms. KAREN PETERSON is a 67 year yo F with a PMHx notable for kidney stones previously for 7 mm in the R kidney. She underwent URS and is here today for follow up. \par \par She also reports she has colitis that is being treated with Budesonide. She has troubles with diarrhea as a result of her colitis. Her last colonoscopy was during the summer of . \par \par Her Litholink from 21 showed low urine output, low citrate, SS CaOx, and SS UA. Recs include increasing urine volume, adding citrate (borderline 460s) with lemon water supplementation and decreasing Shabbir. \par \par ULS today with no stones in L side, two collections echogenic ~5 mm on  R side in MP and LP. Plan for CT to further characterize.\par \par 11/3:\par ULS today without kidney stones, previously seen 5 mm LP stone not noted. No LL today. Has been on K cit 1 tab BID. 90% COM And 10% CP. Discussed need for LL. Also having recurrence of her OAB symptoms, has been on botox with Dr. Xie with good results, d/w Marita NP for repeat Botox. PVR today 90cc. Restart tamsulosin.\par  [Urinary Incontinence] : no urinary incontinence [Urinary Retention] : no urinary retention [Urinary Urgency] : no urinary urgency [Urinary Frequency] : no urinary frequency

## 2022-11-04 ENCOUNTER — RX RENEWAL (OUTPATIENT)
Age: 67
End: 2022-11-04

## 2022-11-04 DIAGNOSIS — N32.81 OVERACTIVE BLADDER: ICD-10-CM

## 2022-11-04 DIAGNOSIS — N20.0 CALCULUS OF KIDNEY: ICD-10-CM

## 2022-11-04 RX ORDER — TAMSULOSIN HYDROCHLORIDE 0.4 MG/1
0.4 CAPSULE ORAL
Qty: 90 | Refills: 0 | Status: ACTIVE | COMMUNITY
Start: 2022-11-03 | End: 1900-01-01

## 2022-11-14 ENCOUNTER — APPOINTMENT (OUTPATIENT)
Dept: GASTROENTEROLOGY | Facility: CLINIC | Age: 67
End: 2022-11-14

## 2022-11-14 VITALS
HEIGHT: 62 IN | DIASTOLIC BLOOD PRESSURE: 98 MMHG | BODY MASS INDEX: 53.92 KG/M2 | WEIGHT: 293 LBS | HEART RATE: 88 BPM | TEMPERATURE: 97 F | SYSTOLIC BLOOD PRESSURE: 160 MMHG | OXYGEN SATURATION: 98 %

## 2022-11-14 PROCEDURE — 99214 OFFICE O/P EST MOD 30 MIN: CPT

## 2022-11-14 RX ORDER — DIPHENOXYLATE HYDROCHLORIDE AND ATROPINE SULFATE 2.5; .025 MG/1; MG/1
2.5-0.025 TABLET ORAL
Qty: 120 | Refills: 0 | Status: ACTIVE | COMMUNITY
Start: 2022-11-14 | End: 1900-01-01

## 2022-11-14 RX ORDER — HYOSCYAMINE SULFATE 0.12 MG/1
0.12 TABLET SUBLINGUAL 4 TIMES DAILY
Qty: 30 | Refills: 3 | Status: ACTIVE | COMMUNITY
Start: 2022-11-14 | End: 1900-01-01

## 2022-11-14 NOTE — ASSESSMENT
[FreeTextEntry1] : Impression:\par \par #1 diarrhea predominant IBS–\par                      -Chronic symptoms of intermittent lower abdominal crampy pain in association with diarrhea.  Functional etiology of symptoms attributed to diarrhea predominant IBS suspected.  Associated dietary component from lactose intolerance or other dietary culprits likely contributory.  Also suspect medications may be contributory (such as from Ozempic and possibly others).  Addition, possible component of bile acid diarrhea related to prior cholecystectomy may also warrant assessment.  Of note, patient indicates possible history "Crohn's and colitis"–await records for review and confirmation although I am skeptical patient has IBD.\par                       -Still symptomatic but overall improved from prior assessment.  Experiencing episodic self-limited "attacks" of crampy abdominal pain with diarrhea.  Functional etiology attributed to diarrhea predominant IBS suspected.  Dietary sensitivity appears to be significant factor.  \par \par #2 dyspepsia–associated with nausea/nonbloody vomiting–intermittent episodes of epigastric and subxiphoid discomfort with episodic nausea and nonbloody emesis.  Possible consequence of prior laparoscopic gastric band placement and subsequent removal.  Associated component functional dyspepsia considered.\par \par #3 obesity–BMI 53.77.\par \par #4 status post laparoscopic cholecystectomy–possible history gallstone pancreatitis.\par \par #5 diverticulosis (per patient).\par \par #6 history colonic polyps.\par \par General medical problems:\par \par -Hypertension.\par \par -Overactive bladder status post InterStim device.\par \par -Osteoarthritis status post bilateral THR.  Chronic knee pain.\par \par -History uterine fibroids status post uterine myomectomy.\par \par -Status post NICK/BSO (per patient–possible "precancer").\par \par -Gout.\par \par -History kidney stones status both cystoscopic removal as well as percutaneous nephrostomy for kidney stone removal.  Required repeat intervention 2/2022 and 6/2022.\par \par -History anemia.\par \par -Obstructive sleep apnea (apparently mild–does not require CPAP or other device).

## 2022-11-14 NOTE — CONSULT LETTER
[Dear  ___] : Dear  [unfilled], [Consult Letter:] : I had the pleasure of evaluating your patient, [unfilled]. [Please see my note below.] : Please see my note below. [Consult Closing:] : Thank you very much for allowing me to participate in the care of this patient.  If you have any questions, please do not hesitate to contact me. [Sincerely,] : Sincerely, [FreeTextEntry2] : Dr. Erik Dela Cruz [FreeTextEntry3] : Rehan Garcia MD

## 2022-11-14 NOTE — HISTORY OF PRESENT ILLNESS
[FreeTextEntry1] : Office revisit on 11/14/2022.\par \par Patient presents for follow-up regarding episodic complaints of abdominal pain and intermittent diarrhea.\par \par Previously evaluated for office office consultation on 6/24/2022.\par \par INITIAL CONSULTATION NOTE:\par \par The patient is a 67-year-old woman evaluated for consultation regarding complaints of chronic abdominal pain, nausea, vomiting and chronic diarrhea.  PMD: Dr. Erik Dela Cruz.\par \par History per patient.  Await receipt of records for review.\par \par Patient indicates current GI symptoms mostly following laparoscopic gastric band removal in 2017.  Patient indicates history of obesity.  Underwent laparoscopic gastric band insertion in 2008.  Baseline weight was 300 pounds.  Following laparoscopic gastric band placement lost 50 pounds.\par \par Describes scenario of sense of retrosternal bolus hang up with eating following laparoscopic gastric band placement.  Was not satisfied with result and wanted gastric sleeve operation.  Underwent reoperation 2017 for laparoscopic gastric band removal.  Describes somewhat technically complicated operative course with  adhesions and other issues (per patient await prior records including operative report for review).  Was advised that gastric sleeve was not recommended based on these issues.\par \par Chronic GI symptoms since laparoscopic gastric band removal.  However, suspect some symptoms preceded this.\par \par Experiencing intermittent nausea and episodic vomiting over the past 2 years.  Indicates that "heavy food" and occasionally medications can be provocative.  Symptoms are intermittent and episodic.  Describes approximately 3 times per month may have episodes of nausea and nonbloody vomiting.\par \par Experiencing chronic abdominal pain over the past 2 to 3 years.  Has 2 different types of abdominal pain:\par -Dominant complaint is a lower abdominal crampy "labor like" intermittent pain.  Last 5 to 10 minutes.  Precipitating factors such as any relationship to eating, particular food or bowel movement not reported.  Stress perhaps provocative.  Does get relief with hyoscyamine as needed.  Can also experience this with associated left lower quadrant abdominal cramps.\par -Experiencing a separate epigastric and subxiphoid pain described more as a soreness.  Sometimes noted after vomiting.\par \par Experiences intermittent diarrhea.  Somewhat improved at the current time.  This apparently has been for the last several years.  She indicates that 1 year ago she was having 10 diarrheal stools daily.  Apparently much better now as she now describes 1 soft [place] 5 or [place] 6 bowel movement daily.  Denies rectal bleeding.  Last episode of loose diarrheal [place] 7 stool was 2 weeks ago.\par \par Patient indicates being told of irritable bowel syndrome, lactose intolerance and possibly Crohn's disease or some type of colitis.  Treated with hyoscyamine as needed for abdominal cramps and Lomotil as needed for diarrhea.  Not on any specific therapy for IBD.\par \par Patient had colonoscopy and upper endoscopy 1 year ago.  As noted, history was exclusive for from patient and await receipt of records including endoscopic evaluation, laboratory testing and any imaging.\par \par Describes previous history of gallstones and may have had an episode of gallstone pancreatitis.  Previously underwent laparoscopic cholecystectomy.  Does not describe needing ERCP.  Believes colonic polyps removed at prior colonoscopy.  Was told of having diverticulosis.  Patient reports no other past history of digestive illness except as noted.  Family history of colon cancer is not reported.\par \par Medical comorbidity noted for hypertension, obesity, overactive bladder (status post InterStim insertion), gout, obstructive sleep apnea (mild) and kidney stones.\par \par Current diet erratic and inconsistent.:\par -Breakfast either skips it or has a protein shake.\par -Lunch skips.\par -Dinner–typically veggie burger or something like that.  Mostly 1 meal daily.\par \par CURRENT HISTORY:\par \par ORV 11/14/2022:     -Evaluated for follow-up regarding complaints of episodic abdominal pain and diarrhea.  Still symptomatic but improved relative to assessment at time of initial consultation.  Now describing episodes as occurring as "attacks" which appear self-limited.  Although most recent "attack" was on 11/9 she indicates having been doing quite well for the previous 2 months with prior episode before this occurring in 9/2022.  Utilizing ginger which she feels helps.  Of note, during time interval between episodes she feels well with overall stable appetite and weight.  Reports no complaints of dysphagia, heartburn, nausea, vomiting or abdominal pain.  In addition, during time interval between episodes she would typically have 1 formed [place] 3 or [place] 4 bowel movement without persistence of diarrhea. \par                                   -As noted, most recent "attack" occurred on 11/9 which she indicates is somewhat stereotypical for her.  Was eating out at a Chinese restaurant and had shrimp and lo mein.  Suddenly felt onset of belching followed by initial upper abdominal discomfort which gradually moved to predominantly mid and lower abdominal labor like crampy abdominal pain.  Felt nauseous that day but without vomiting and also felt somewhat constipated feeling.  Then next day on 11/10 she took a stool softener at 10 AM and at 7 PM that night had a hard formed bowel movement.  Following days on 11/12 and 11/13 she had 3 nonbloody diarrheal [place] 5–7 stools.  Today so far only 1 bowel movement.  Indicates that some attacks tend to occur after fried or greasy food.  Also indicates being lactose sensitive.  In the past would utilize hyoscyamine as well as generic Lomotil as needed but did not have these available.\par                                   -Indicates having had intervention for kidney stones 2/2022 and 6/8/2022.
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
## 2022-11-29 ENCOUNTER — APPOINTMENT (OUTPATIENT)
Dept: ORTHOPEDIC SURGERY | Facility: CLINIC | Age: 67
End: 2022-11-29
Payer: MEDICARE

## 2022-11-29 ENCOUNTER — NON-APPOINTMENT (OUTPATIENT)
Age: 67
End: 2022-11-29

## 2022-11-29 VITALS
HEART RATE: 81 BPM | BODY MASS INDEX: 52.3 KG/M2 | DIASTOLIC BLOOD PRESSURE: 73 MMHG | HEIGHT: 62 IN | WEIGHT: 284.2 LBS | SYSTOLIC BLOOD PRESSURE: 114 MMHG

## 2022-11-29 DIAGNOSIS — M17.11 UNILATERAL PRIMARY OSTEOARTHRITIS, RIGHT KNEE: ICD-10-CM

## 2022-11-29 DIAGNOSIS — M17.10 UNILATERAL PRIMARY OSTEOARTHRITIS, UNSPECIFIED KNEE: ICD-10-CM

## 2022-11-29 DIAGNOSIS — M17.12 UNILATERAL PRIMARY OSTEOARTHRITIS, LEFT KNEE: ICD-10-CM

## 2022-11-29 PROCEDURE — 99214 OFFICE O/P EST MOD 30 MIN: CPT

## 2022-11-29 PROCEDURE — 73562 X-RAY EXAM OF KNEE 3: CPT | Mod: 50

## 2022-11-29 NOTE — REASON FOR VISIT
[Initial Visit] : an initial visit for [Knee Pain] : knee pain [Osteoarthritis, Knee] : osteoarthritis of the knee

## 2022-11-29 NOTE — HISTORY OF PRESENT ILLNESS
[Worsening] : worsening [5] : a current pain level of 5/10 [9] : a maximum pain level of 9/10 [Standing] : standing [Constant] : ~He/She~ states the symptoms seem to be constant [Walking] : worsened by walking [Knee Flexion] : worsened with knee flexion [Knee Extension] : worsened with knee extension [de-identified] : Patient is being seen today for the first time in this office with a complaint of bilateral knee pain the patient has had bilateral hip replacements by Dr. Lowe approximately 2 years ago and is now being seen for her knees.  The patient was doing well with a weight control program prior to [facility] but since the gyms were closed down and she developed irritable bowel syndrome she has not been able to continue with her exercises and these have become very painful and she has a difficult time ambulating. She had a lap band placed for gastric bypass and then removed due to complications. She was scheduled for a sleeve gastrectomy but due to her significant scar tissue surgery was canceled. She has tried injections in the past with minimal improvement. Patient has been unable to use Celebrex in the past but has used Motrin 800 mg with very minor relief. [de-identified] : Very minimal relief with Motrin 800 mg to the tolerance of her IBS

## 2022-11-29 NOTE — PHYSICAL EXAM
[Antalgic] : antalgic [Shuffling] : shuffling [LE] : Sensory: Intact in bilateral lower extremities [DP] : dorsalis pedis 2+ and symmetric bilaterally [PT] : posterior tibial 2+ and symmetric bilaterally [Normal RLE] : Right Lower Extremity: No scars, rashes, lesions, ulcers, skin intact [Normal LLE] : Left Lower Extremity: No scars, rashes, lesions, ulcers, skin intact [Normal Touch] : sensation intact for touch [Obese] : obese [Normal] : no peripheral adenopathy appreciated [de-identified] : X-rays were reviewed before the examination.  Patient's BMI is calculated at 51.9 [de-identified] : Patient has sensation and pulses in both lower extremities good strength against resistance in EHL and dorsiflexion the right knee comes to full extension and is able to flex approximately 110 degrees somewhat limited by body habitus.  The left knee has about 5 degrees of flexion contracture also bends to approximately 100 degrees.  Patient has no discomfort with internal and external rotation of the hips.  Both knees show a significant valgus angulation of approximately 7 degrees [de-identified] : BMI is 59 [de-identified] : 3 views of both knees were done in the office today showing severe end-stage DJD with valgus appearance and bone-on-bone wear with cystic formation in the lateral tibial plateau bilaterally there are large spurs in the lateral joint line\par [place] view shows lateralization of the femoral sulcus with enhanced wear of the lateral facet of the patellas and large debris visible on either side of the trochlear groove

## 2022-11-29 NOTE — DISCUSSION/SUMMARY
[Surgical risks reviewed] : Surgical risks reviewed [de-identified] : I discussed nonoperative and operative treatment options for their arthritis. We discussed nonoperative treatments options including activity modification, therapy, gait aides, nonsteroidal anti-inflammatory medications, and injections. I explained to her that given her BMI she is at a significantly elevated risk of complications including infection. I explained that a prosthetic joint infection is a devastating complication that could lead to amputation of the limb. I also explained that the lifetime of the implant will be significantly shortened at higher weights. The patient would like to continue with nonoperative treatment of their arthritis and would like to proceed with activity modification and weight loss, physical therapy, radiofrequency ablation of bilateral knees. Our goal is for her to get down to a weight closer to 240lbs to decrease her risk of complications and improve her knee pain.\par \par We discussed that when nonoperative treatment is no longer successful and their pain is severe enough that it is affecting their ability to perform activities of daily living, a joint replacement may help them.\par

## 2022-11-30 ENCOUNTER — NON-APPOINTMENT (OUTPATIENT)
Age: 67
End: 2022-11-30

## 2022-11-30 DIAGNOSIS — B37.31 ACUTE CANDIDIASIS OF VULVA AND VAGINA: ICD-10-CM

## 2022-11-30 DIAGNOSIS — N95.8 OTHER SPECIFIED MENOPAUSAL AND PERIMENOPAUSAL DISORDERS: ICD-10-CM

## 2022-11-30 DIAGNOSIS — N39.0 URINARY TRACT INFECTION, SITE NOT SPECIFIED: ICD-10-CM

## 2022-11-30 LAB
APPEARANCE: CLEAR
BACTERIA: ABNORMAL
BILIRUBIN URINE: NEGATIVE
BLOOD URINE: NEGATIVE
COLOR: YELLOW
GLUCOSE QUALITATIVE U: NEGATIVE
HYALINE CASTS: 4 /LPF
KETONES URINE: NEGATIVE
LEUKOCYTE ESTERASE URINE: ABNORMAL
MICROSCOPIC-UA: NORMAL
NITRITE URINE: POSITIVE
PH URINE: 5.5
PROTEIN URINE: NORMAL
RED BLOOD CELLS URINE: 3 /HPF
SPECIFIC GRAVITY URINE: 1.02
SQUAMOUS EPITHELIAL CELLS: 1 /HPF
UROBILINOGEN URINE: NORMAL
WHITE BLOOD CELLS URINE: 69 /HPF

## 2022-11-30 RX ORDER — TROSPIUM CHLORIDE 20 MG/1
20 TABLET, FILM COATED ORAL
Qty: 180 | Refills: 1 | Status: COMPLETED | COMMUNITY
Start: 2021-05-20 | End: 2022-11-30

## 2022-11-30 RX ORDER — SULFAMETHOXAZOLE AND TRIMETHOPRIM 800; 160 MG/1; MG/1
800-160 TABLET ORAL
Qty: 10 | Refills: 0 | Status: ACTIVE | COMMUNITY
Start: 2022-11-30 | End: 1900-01-01

## 2022-12-05 ENCOUNTER — APPOINTMENT (OUTPATIENT)
Dept: UROLOGY | Facility: CLINIC | Age: 67
End: 2022-12-05

## 2022-12-10 ENCOUNTER — TRANSCRIPTION ENCOUNTER (OUTPATIENT)
Age: 67
End: 2022-12-10

## 2022-12-10 RX ORDER — AMOXICILLIN AND CLAVULANATE POTASSIUM 875; 125 MG/1; MG/1
875-125 TABLET, COATED ORAL
Qty: 14 | Refills: 0 | Status: ACTIVE | COMMUNITY
Start: 2022-12-10 | End: 1900-01-01

## 2022-12-12 NOTE — ED ADULT NURSE NOTE - TOBACCO USE
Former smoker

## 2022-12-14 ENCOUNTER — NON-APPOINTMENT (OUTPATIENT)
Age: 67
End: 2022-12-14

## 2022-12-14 PROBLEM — N95.8 GENITOURINARY SYNDROME OF MENOPAUSE: Status: ACTIVE | Noted: 2022-12-14

## 2022-12-14 PROBLEM — B37.31 YEAST INFECTION OF THE VAGINA: Status: RESOLVED | Noted: 2022-12-14 | Resolved: 2023-01-13

## 2022-12-14 RX ORDER — FLUCONAZOLE 150 MG/1
150 TABLET ORAL
Qty: 1 | Refills: 0 | Status: ACTIVE | COMMUNITY
Start: 2022-12-14 | End: 1900-01-01

## 2022-12-14 RX ORDER — ESTRADIOL 0.1 MG/G
0.1 CREAM VAGINAL
Qty: 42.5 | Refills: 11 | Status: ACTIVE | COMMUNITY
Start: 2022-12-14 | End: 1900-01-01

## 2022-12-14 NOTE — PATIENT PROFILE ADULT - NSSCCAGEALCOHOLCUTDOWN_GEN_A_NUR
[FreeTextEntry1] : Mr. AGUILAR PETERS comes in today for a urologic follow-up. For the past week, he has been experiencing gross painless hematuria with the passage of clots. He denies any fever or trauma to the area. \par \par Based on a PSA elevation to 4.3 ng/ml, Mr. Peters underwent a prostate biopsy in December 2018 which identified a Grade Group 4 prostatic adenocarcinoma (see below). He chose EBRT and completed the course in March 2019 with Dr. Jeanne Dillard.  His PSA nadired at 0.38ng/ml in June 2021 (see below). \par \par From his general urologic history, Mr. PETERS reports mild stable lower urinary tract symptoms, with nocturia x 1-2. He is continuing on tamsulosin 0.4mg.  \par IPSS: 10/35\par Sono (performed to assess bladder emptying): 152cc PVR\par (Dec 2021 PVR: 104cc)\par \par PSAs: 5/24/22--0.32; 12/1/21--0.32; 6/9/21--0.38; 9/18/20--0.67; 5/14/20--2.73; 4/11/19--2.7; 10/15/18--4.3; 9/27/18--5.3; 7/16/18--4.6; 6/1/16--2.67; 2/9/16--3.33; 9/17/15--2.72; \par \par Pathology (Prostate Biopsy): 12/19/18--Grade Group 4 prostatic adenocarcinoma (Tiago 4+4) in the right base, with 1/3 cores and <5% of the tissue involved. Hardin 3+3 in the right midgland; \par \par MRI: 11/7/18--1.1cm lesion in the right peripheral base characterized as a PI-RADS 5 lesion. 73cc gland; \par \par CT: 1/4/19--Enlarged, lobulated prostate which indents the base of the bladder with heterogeneous enhancement consistent with prostate cancer. No osseous metastases; 1/6/16--Tiny nonobstructing stone in each kidney. Stable left adrenal adenoma. Moderate enlargement and intravesical protrusion of the prostate. \par \par Bone Scan: 1/4/19--No definite evidence of bony metastatic disease; \par \par Scrotal US: 1/24/17--Bilateral small hydroceles (L > R). Bilateral spermatoceles; 
no

## 2022-12-15 ENCOUNTER — APPOINTMENT (OUTPATIENT)
Dept: UROLOGY | Facility: CLINIC | Age: 67
End: 2022-12-15

## 2022-12-19 ENCOUNTER — APPOINTMENT (OUTPATIENT)
Dept: PHYSICAL MEDICINE AND REHAB | Facility: CLINIC | Age: 67
End: 2022-12-19

## 2022-12-19 DIAGNOSIS — M25.561 PAIN IN RIGHT KNEE: ICD-10-CM

## 2022-12-19 DIAGNOSIS — M25.562 PAIN IN RIGHT KNEE: ICD-10-CM

## 2022-12-19 DIAGNOSIS — G89.29 PAIN IN RIGHT KNEE: ICD-10-CM

## 2022-12-19 PROCEDURE — 99204 OFFICE O/P NEW MOD 45 MIN: CPT

## 2022-12-19 NOTE — HISTORY OF PRESENT ILLNESS
[FreeTextEntry1] : KAREN PETERSON is an 67 year old F here for initial evaluation of knee pains. Ms. PETERSON was sent for consultation by Dr. Garrett for possible knee genicular nerve blocks injection. She had bilateral THR in the past and was told that she also needed bilateral TKRs. However due to her weight she is not a candidate for TKR yet. She was advised to try genicular nerve blocks.\par \par Pain location: bilateral knee pains\par Quality: sharp, stabbing\par Radiation: along the knee up and down\par Severity: 10/10\par Onset: many years ago with recent exacerbations\par Associated symptoms: denies\par Numbness: denies\par Weakness: denies\par Exacerbated by: walking, standing, getting up and down stairs\par Improved by: rest\par Bowel or bladder involvement: hx of bladder issues with stim implant\par \par Denies bowel/bladder dysfunction, saddle anesthesia, fevers, chills, weight loss, night pain, or night sweats at this time.\par \par The pain interferes with function, ADLs and quality of life.\par Patient had tried Acetaminophen, NSAIDs, prescription pain medications, muscle relaxants without any lasting relief of pain.\par \par Patient had imaging studies to evaluate the pain.\par Patient had tried physical therapy, physician directed home exercises, stretching, lifestyle modification for over 8 weeks without any significant relief.\par

## 2022-12-19 NOTE — DATA REVIEWED
[FreeTextEntry1] : 11/29/22 XR bilateral knees from Dr. Garrett's note:\par Imaging: 3 views of both knees were done in the office today showing severe end-stage DJD with valgus appearance and bone-on-bone wear with cystic formation in the lateral tibial plateau bilaterally there are large spurs in the lateral joint line\par [place] view shows lateralization of the femoral sulcus with enhanced wear of the lateral facet of the patellas and large debris visible on either side of the trochlear groove.

## 2022-12-19 NOTE — ASSESSMENT
[FreeTextEntry1] : 67-year-old female with chronic longstanding knee pain here with acute on chronic exacerbation due to severe osteoarthritis with valgus.\par \par Patient reassured and educated on the diagnosis and treatment options.\par \par Dr. Garrett's clinical notes from November 29, 2022 reviewed\par X-ray bilateral knees reviewed\par \par Advised patient on weight loss\par Patient is on antibiotics for UTI at this time\par \par Patient had tried and failed conservative treatment. This includes but is not limited to: Acetaminophen, NSAIDs, muscle relaxants, neuropathic medications, physician directed home exercises and/or physical therapy for at least 8 weeks. After a thorough discussion of risks and benefits patient would like to proceed with bilateral knee genicular nerve blocks with fluoroscopic guidance we will go to proceed with radiofrequency ablation in the future.\par \par Based on the history, physical exam and diagnostic findings, patient will benefit from this procedure. The goal of this procedure is to reduce pain and inflammation, improve function and range of motion and to further promote increased activity and return to previous level of functioning. The ultimate goal of performing this procedure is to improve patient's quality of life.\par \par Asking for authorization for bilateral knee genicular nerve blocks with fluoroscopic guidance we will go to proceed with radiofrequency ablation in the future to help treat patient´s pain.  Patient will be scheduled for this procedure.\par \par Continue follow up with Dr. Garrett\par \par Patient was advised if the following symptoms develop: chills, fever, loss of bladder control, bowel incontinence or urinary retention, numbness/tingling or weakness is present in upper or lower extremities, to go to the nearest emergency room. This may be a new clinical condition not present at the time of the patient visit that may lead to paralysis and/or death. Patient advised if the above symptoms developed to also call the office immediately to inform us and to go to the nearest emergency room.\par \par This note was generated using Dragon medical dictation software. A reasonable effort had been made for proofreading its contents, but spelling mistakes or grammatical errors may still remain. If there are any questions or points of clarification needed please notify my office.\par

## 2022-12-19 NOTE — REVIEW OF SYSTEMS
[Dysuria] : dysuria [Negative] : Psychiatric [FreeTextEntry8] : currently being treated for UTI [FreeTextEntry9] : back, knee pains

## 2022-12-19 NOTE — PHYSICAL EXAM
[FreeTextEntry1] : General exam \par \par Constitutional: The patient appears well-developed, obese. Patient is well-groomed.  \par \par Skin: The skin is warm and dry, with normal turgor.  No rashes or lesions are noted.  \par \par Eyes: PERRL.  \par \par ENMT: Ears: Hearing is grossly within normal limits.  \par \par Neck: Supple: The neck is supple.  \par \par Respiratory: Inspection: Breathing unlabored.  \par \par Neurologic: Alert and oriented x 3. \par \par Psychiatric: Patient is cooperative and appropriate.  Mood and affect are normal.  Patient's insight is good, and memory and judgment are intact.\par \par BILATERAL KNEE EXAM\par \par APPEARANCE:\par No visible scars\par Severe gross deformity - valgus\par No erythema, swelling or ecchymosis\par Normal temperature to touch\par No muscle atrophy of the lower extremity\par No clubbing, cyanosis or erythema of the lower extremity\par \par TENDERNESS:\par +tenderness medial joint line\par +tenderness lateral joint line\par + palpable effusion\par +pes anserine tenderness\par \par ROM:\par Pain limited AROM\par Pain limited PROM\par +pain with flexion, extension of the knee\par +crepitus\par \par SPECIAL TESTS:\par +compression test is negative. \par Valgus stress test is +\par Varus stress test is +\par Harlan test is negative. \par Anterior drawer test is negative. \par Posterior drawer test is negative. \par Patella grinding test is +\par Lachman test is +\par \par GAIT:\par +antalgic gait\par Balance normal with ambulation\par

## 2022-12-22 NOTE — H&P PST ADULT - EYES
Benefits, risks, and possible complications of procedure explained to patient/caregiver who verbalized understanding and gave verbal consent.
EOMI; PERRL; no drainage or redness

## 2022-12-27 ENCOUNTER — NON-APPOINTMENT (OUTPATIENT)
Age: 67
End: 2022-12-27

## 2023-01-04 ENCOUNTER — APPOINTMENT (OUTPATIENT)
Dept: BARIATRICS | Facility: CLINIC | Age: 68
End: 2023-01-04
Payer: MEDICARE

## 2023-01-04 ENCOUNTER — OUTPATIENT (OUTPATIENT)
Dept: OUTPATIENT SERVICES | Facility: HOSPITAL | Age: 68
LOS: 1 days | End: 2023-01-04
Payer: COMMERCIAL

## 2023-01-04 ENCOUNTER — NON-APPOINTMENT (OUTPATIENT)
Age: 68
End: 2023-01-04

## 2023-01-04 VITALS
WEIGHT: 290.12 LBS | HEIGHT: 62 IN | OXYGEN SATURATION: 99 % | DIASTOLIC BLOOD PRESSURE: 76 MMHG | BODY MASS INDEX: 53.39 KG/M2 | HEART RATE: 82 BPM | TEMPERATURE: 97 F | SYSTOLIC BLOOD PRESSURE: 114 MMHG

## 2023-01-04 DIAGNOSIS — R13.19 OTHER DYSPHAGIA: ICD-10-CM

## 2023-01-04 DIAGNOSIS — R11.2 NAUSEA WITH VOMITING, UNSPECIFIED: ICD-10-CM

## 2023-01-04 DIAGNOSIS — Z90.710 ACQUIRED ABSENCE OF BOTH CERVIX AND UTERUS: Chronic | ICD-10-CM

## 2023-01-04 DIAGNOSIS — Z98.890 OTHER SPECIFIED POSTPROCEDURAL STATES: Chronic | ICD-10-CM

## 2023-01-04 DIAGNOSIS — E11.9 TYPE 2 DIABETES MELLITUS WITHOUT COMPLICATIONS: ICD-10-CM

## 2023-01-04 DIAGNOSIS — Z96.641 PRESENCE OF RIGHT ARTIFICIAL HIP JOINT: Chronic | ICD-10-CM

## 2023-01-04 DIAGNOSIS — Z98.49 CATARACT EXTRACTION STATUS, UNSPECIFIED EYE: Chronic | ICD-10-CM

## 2023-01-04 DIAGNOSIS — E11.9 TYPE 2 DIABETES MELLITUS W/OUT COMPLICATIONS: ICD-10-CM

## 2023-01-04 DIAGNOSIS — Z87.442 PERSONAL HISTORY OF URINARY CALCULI: Chronic | ICD-10-CM

## 2023-01-04 DIAGNOSIS — E66.01 MORBID (SEVERE) OBESITY DUE TO EXCESS CALORIES: ICD-10-CM

## 2023-01-04 DIAGNOSIS — K29.70 GASTRITIS, UNSPECIFIED, W/OUT BLEEDING: ICD-10-CM

## 2023-01-04 DIAGNOSIS — Z98.84 BARIATRIC SURGERY STATUS: ICD-10-CM

## 2023-01-04 DIAGNOSIS — N32.81 OVERACTIVE BLADDER: Chronic | ICD-10-CM

## 2023-01-04 DIAGNOSIS — Z90.89 ACQUIRED ABSENCE OF OTHER ORGANS: Chronic | ICD-10-CM

## 2023-01-04 DIAGNOSIS — Z96.642 PRESENCE OF LEFT ARTIFICIAL HIP JOINT: Chronic | ICD-10-CM

## 2023-01-04 DIAGNOSIS — I10 ESSENTIAL (PRIMARY) HYPERTENSION: ICD-10-CM

## 2023-01-04 DIAGNOSIS — Z98.89 OTHER SPECIFIED POSTPROCEDURAL STATES: Chronic | ICD-10-CM

## 2023-01-04 PROCEDURE — 99204 OFFICE O/P NEW MOD 45 MIN: CPT

## 2023-01-04 PROCEDURE — 74220 X-RAY XM ESOPHAGUS 1CNTRST: CPT | Mod: 26

## 2023-01-04 PROCEDURE — 74220 X-RAY XM ESOPHAGUS 1CNTRST: CPT

## 2023-01-04 NOTE — HISTORY OF PRESENT ILLNESS
[de-identified] : Pt had band removed in 2017, last seen in the practice in 2018\par Presents today with epigastric pains, nausea, vomiting and diarrhea.  Symptoms on and off for several months now (maybe as far back as Oct).\par Episode between [place] and new years but didn't go to ER as suggested by her GI.\par Pt states history of IBD> [Procedure: ___] : Procedure performed: [unfilled]  [Date of Surgery: ___] : Date of Surgery:   [unfilled] [Surgeon Name:   ___] : Surgeon Name: Dr. CONSTANTINO [de-identified] : Removal of adjustable gastric band and port 3/23/2017 - Lucio (aborted attempt at Single Stage revision due to scarring)

## 2023-01-04 NOTE — ASSESSMENT
[FreeTextEntry1] : VE performed today shows no obstructive pathology of the esophagus or stomach.  Contrast flows freely into the stomach without reflux.  No mass lesion or defect.  Contrast however has not exited the stomach for duration of the study.\par \par Pt's symptoms of epigastric pain, diarrhea could be related to viral syndrome or underlying IBD.  In conjunction with vomiting concern for gastritis, PUD or possible Delayed gastric emptying or gastroparesis particularly in face of previous Lap band and history of DM.  Recommend she follow up with her GI for EGD +/- Biopsy and further workup of diarrhea and IBD.  Additional imaging studies such as Gastric Emptying can be considered to r/o Gastroparesis but I would defer to her Gastroenterologist.  Pt should also discuss with her Gastro PPI use but may wish to await results of EGD first.\par \par Nutritional counseling has been provided. The patient is encouraged to remain calorie conscious and continue a low fat, low carbohydrate, high protein diet. Also, emphasis has been placed on the importance of adequate hydration, multi-vitamin supplementation and exercise.  (15 min)\par \par Given the above, until above can be better assessed she remains a poor candidate for additional weight loss surgery.  [facility] with [facility] for [facility] medical weight management should she so choose.\par \par F/u PRN.

## 2023-01-04 NOTE — CONSULT LETTER
[Dear  ___] : Dear  [unfilled], [Courtesy Letter:] : I had the pleasure of seeing your patient, [unfilled], in my office today. [Please see my note below.] : Please see my note below. [Consult Closing:] : Thank you very much for allowing me to participate in the care of this patient.  If you have any questions, please do not hesitate to contact me. [Sincerely,] : Sincerely, [FreeTextEntry3] : Roberth Valdes MD, FACS, FASMBS\par Chair, Dept of Surgery, [facility]\par [facility]\par [facility]\par [facility]\par [address]\[place] [address]\par P [phone]\par F [phone]\par \par

## 2023-01-04 NOTE — PHYSICAL EXAM
[Obese, well nourished, in no acute distress] : obese, well nourished, in no acute distress [Normal] : affect appropriate [de-identified] : No signs of infection, no discharge or drainage from incisions. [de-identified] : Assisted with Rolling chair.  Ambulates with great difficulty.

## 2023-01-04 NOTE — REVIEW OF SYSTEMS
[Recent Change In Weight] : ~T recent weight change [SOB on Exertion] : shortness of breath during exertion [Vomiting] : vomiting [Diarrhea] : diarrhea [Joint Pain] : joint pain [Joint Stiffness] : joint stiffness [Negative] : Allergic/Immunologic

## 2023-01-12 NOTE — OCCUPATIONAL THERAPY INITIAL EVALUATION ADULT - LEVEL OF INDEPENDENCE: BED TO CHAIR, REHAB EVAL
unable to perform/unsafe Finasteride Male Counseling: Finasteride Counseling:  I discussed with the patient the risks of use of finasteride including but not limited to decreased libido, decreased ejaculate volume, gynecomastia, and depression. Women should not handle medication.  All of the patient's questions and concerns were addressed. Finasteride Counseling:  I discussed with the patient the risks of use of finasteride including but not limited to decreased libido, decreased ejaculate volume, gynecomastia, and depression. Women should not handle medication.  All of the patient's questions and concerns were addressed.

## 2023-01-16 ENCOUNTER — NON-APPOINTMENT (OUTPATIENT)
Age: 68
End: 2023-01-16

## 2023-02-03 ENCOUNTER — APPOINTMENT (OUTPATIENT)
Dept: GASTROENTEROLOGY | Facility: CLINIC | Age: 68
End: 2023-02-03
Payer: MEDICARE

## 2023-02-03 VITALS
OXYGEN SATURATION: 98 % | TEMPERATURE: 97.4 F | HEART RATE: 78 BPM | BODY MASS INDEX: 51.89 KG/M2 | HEIGHT: 62 IN | DIASTOLIC BLOOD PRESSURE: 94 MMHG | WEIGHT: 282 LBS | SYSTOLIC BLOOD PRESSURE: 155 MMHG

## 2023-02-03 DIAGNOSIS — K50.10 CROHN'S DISEASE OF LARGE INTESTINE W/OUT COMPLICATIONS: ICD-10-CM

## 2023-02-03 PROCEDURE — 99214 OFFICE O/P EST MOD 30 MIN: CPT

## 2023-02-03 NOTE — REVIEW OF SYSTEMS
[As Noted in HPI] : as noted in HPI [Abdominal Pain] : abdominal pain [Vomiting] : no vomiting [Constipation] : no constipation [Diarrhea] : no diarrhea [Heartburn] : no heartburn [Melena (black stool)] : no melena [Bleeding] : no bleeding [Fecal Incontinence (soiling)] : no fecal incontinence [Bloating (gassiness)] : no bloating

## 2023-02-03 NOTE — HISTORY OF PRESENT ILLNESS
[FreeTextEntry1] : Office revisit on 2/3/2023.\par \par Patient presents for GI follow-up–episodic abdominal pain and vomiting..\par \par Previously evaluated for office office consultation on 6/24/2022.\par \par INITIAL CONSULTATION NOTE:\par \par The patient is a 67-year-old woman evaluated for consultation regarding complaints of chronic abdominal pain, nausea, vomiting and chronic diarrhea.  PMD: Dr. Erik Dela Cruz.\par \par History per patient.  Await receipt of records for review.\par \par Patient indicates current GI symptoms mostly following laparoscopic gastric band removal in 2017.  Patient indicates history of obesity.  Underwent laparoscopic gastric band insertion in 2008.  Baseline weight was 300 pounds.  Following laparoscopic gastric band placement lost 50 pounds.\par \par Describes scenario of sense of retrosternal bolus hang up with eating following laparoscopic gastric band placement.  Was not satisfied with result and wanted gastric sleeve operation.  Underwent reoperation 2017 for laparoscopic gastric band removal.  Describes somewhat technically complicated operative course with  adhesions and other issues (per patient await prior records including operative report for review).  Was advised that gastric sleeve was not recommended based on these issues.\par \par Chronic GI symptoms since laparoscopic gastric band removal.  However, suspect some symptoms preceded this.\par \par Experiencing intermittent nausea and episodic vomiting over the past 2 years.  Indicates that "heavy food" and occasionally medications can be provocative.  Symptoms are intermittent and episodic.  Describes approximately 3 times per month may have episodes of nausea and nonbloody vomiting.\par \par Experiencing chronic abdominal pain over the past 2 to 3 years.  Has 2 different types of abdominal pain:\par -Dominant complaint is a lower abdominal crampy "labor like" intermittent pain.  Last 5 to 10 minutes.  Precipitating factors such as any relationship to eating, particular food or bowel movement not reported.  Stress perhaps provocative.  Does get relief with hyoscyamine as needed.  Can also experience this with associated left lower quadrant abdominal cramps.\par -Experiencing a separate epigastric and subxiphoid pain described more as a soreness.  Sometimes noted after vomiting.\par \par Experiences intermittent diarrhea.  Somewhat improved at the current time.  This apparently has been for the last several years.  She indicates that 1 year ago she was having 10 diarrheal stools daily.  Apparently much better now as she now describes 1 soft [place] 5 or [place] 6 bowel movement daily.  Denies rectal bleeding.  Last episode of loose diarrheal [place] 7 stool was 2 weeks ago.\par \par Patient indicates being told of irritable bowel syndrome, lactose intolerance and possibly Crohn's disease or some type of colitis.  Treated with hyoscyamine as needed for abdominal cramps and Lomotil as needed for diarrhea.  Not on any specific therapy for IBD.\par \par Patient had colonoscopy and upper endoscopy 1 year ago.  As noted, history was exclusive for from patient and await receipt of records including endoscopic evaluation, laboratory testing and any imaging.\par \par Describes previous history of gallstones and may have had an episode of gallstone pancreatitis.  Previously underwent laparoscopic cholecystectomy.  Does not describe needing ERCP.  Believes colonic polyps removed at prior colonoscopy.  Was told of having diverticulosis.  Patient reports no other past history of digestive illness except as noted.  Family history of colon cancer is not reported.\par \par Medical comorbidity noted for hypertension, obesity, overactive bladder (status post InterStim insertion), gout, obstructive sleep apnea (mild) and kidney stones.\par \par Current diet erratic and inconsistent.:\par -Breakfast either skips it or has a protein shake.\par -Lunch skips.\par -Dinner–typically veggie burger or something like that.  Mostly 1 meal daily.\par \par CURRENT HISTORY:\par \par ORV 11/14/2022:     -Evaluated for follow-up regarding complaints of episodic abdominal pain and diarrhea.  Still symptomatic but improved relative to assessment at time of initial consultation.  Now describing episodes as occurring as "attacks" which appear self-limited.  Although most recent "attack" was on 11/9 she indicates having been doing quite well for the previous 2 months with prior episode before this occurring in 9/2022.  Utilizing ginger which she feels helps.  Of note, during time interval between episodes she feels well with overall stable appetite and weight.  Reports no complaints of dysphagia, heartburn, nausea, vomiting or abdominal pain.  In addition, during time interval between episodes she would typically have 1 formed [place] 3 or [place] 4 bowel movement without persistence of diarrhea. \par                                   -As noted, most recent "attack" occurred on 11/9 which she indicates is somewhat stereotypical for her.  Was eating out at a Chinese restaurant and had shrimp and lo mein.  Suddenly felt onset of belching followed by initial upper abdominal discomfort which gradually moved to predominantly mid and lower abdominal labor like crampy abdominal pain.  Felt nauseous that day but without vomiting and also felt somewhat constipated feeling.  Then next day on 11/10 she took a stool softener at 10 AM and at 7 PM that night had a hard formed bowel movement.  Following days on 11/12 and 11/13 she had 3 nonbloody diarrheal [place] 5–7 stools.  Today so far only 1 bowel movement.  Indicates that some attacks tend to occur after fried or greasy food.  Also indicates being lactose sensitive.  In the past would utilize hyoscyamine as well as generic Lomotil as needed but did not have these available.\par                                   -Indicates having had intervention for kidney stones 2/2022 and 6/8/2022.\par \par ORV 2/3/2023:      -Presents for GI follow-up regarding history episodic abdominal pain, vomiting and diarrhea.  Had reported increased symptoms approximately 4 to 6 weeks ago.  Since then indicates symptomatic improvement.  Was able to go through left shoulder rotator cuff surgery 1 week ago without issues.  Planning on starting PT.  At current time indicates that her current diet of bland mostly soft foods is well-tolerated.  Not reporting significant dysphagia at the current time.  At present, not experiencing nausea or vomiting.  Still with upper abdominal pain.  Bowel movements currently improved with daily formed stools.  Esophagram performed 1/4/2023 posed question as to possible cricopharyngeal bar.  Somewhat patulous mid and distal esophagus noted but contrast passed quickly into the stomach.  Transient contrast stasis in mid and distal esophagus noted in association with tertiary contractions.

## 2023-02-03 NOTE — ASSESSMENT
[FreeTextEntry1] : Impression:\par \par #1 diarrhea predominant IBS–\par                      -Chronic symptoms of intermittent lower abdominal crampy pain in association with diarrhea.  Functional etiology of symptoms attributed to diarrhea predominant IBS suspected.  Associated dietary component from lactose intolerance or other dietary culprits likely contributory.  Also suspect medications may be contributory (such as from Ozempic and possibly others).  Addition, possible component of bile acid diarrhea related to prior cholecystectomy may also warrant assessment.  Of note, patient indicates possible history "Crohn's and colitis"–await records for review and confirmation although I am skeptical patient has IBD.\par                       -Improved at current time.  Not currently reporting diarrhea.\par \par #2 dyspepsia–associated with nausea/nonbloody vomiting.  \par              -Intermittent episodes of epigastric and subxiphoid discomfort with episodic nausea and nonbloody emesis.  Possible consequence of prior laparoscopic gastric band placement and subsequent removal.  Associated component functional dyspepsia considered.\par               -Fluctuating and episodic symptoms.  Increase severity approximately 4 to 6 weeks ago.  Improved at current time.  Tolerating oral intake at current time without vomiting.\par \par #3 obesity–BMI 51.58.\par \par #4 status post laparoscopic cholecystectomy–possible history gallstone pancreatitis.\par \par #5 diverticulosis (per patient).\par \par #6 history colonic polyps.\par \par General medical problems:\par \par -Hypertension.\par \par -Overactive bladder status post InterStim device.\par \par -Osteoarthritis status post bilateral THR.  Chronic knee pain.\par \par -History uterine fibroids status post uterine myomectomy.\par \par -Status post NICK/BSO (per patient–possible "precancer").\par \par -Gout.\par \par -History kidney stones status both cystoscopic removal as well as percutaneous nephrostomy for kidney stone removal.  Required repeat intervention 2/2022 and 6/2022.\par \par -History anemia.\par \par -Obstructive sleep apnea (apparently mild–does not require CPAP or other device).
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
## 2023-02-06 NOTE — ED ADULT TRIAGE NOTE - SOURCE OF INFORMATION
[FreeTextEntry1] : Left foraminal HNP L3-4 \par Left facet arthropathy L4-5 \par Bulging disc left L5-S1 \par Foraminal stenosis left L3-S1\par \par Right hip MRI:\par Mild DJD\par Trochanteric bursitis \par \par Continue PT \par \par Recommend: - NSAID - Heating pad - Muscle relaxer - Core strengthening exercise - Hamstring stretching exercise Patient is given back rehabilitation exercise book. \par \par Recommend: - rest - ice - compression - elevation \par \par Follow up in 2 months \par  Patient

## 2023-02-07 ENCOUNTER — APPOINTMENT (OUTPATIENT)
Dept: UROLOGY | Facility: CLINIC | Age: 68
End: 2023-02-07

## 2023-02-27 ENCOUNTER — NON-APPOINTMENT (OUTPATIENT)
Age: 68
End: 2023-02-27

## 2023-03-07 ENCOUNTER — APPOINTMENT (OUTPATIENT)
Dept: ORTHOPEDIC SURGERY | Facility: CLINIC | Age: 68
End: 2023-03-07

## 2023-03-15 ENCOUNTER — APPOINTMENT (OUTPATIENT)
Dept: GASTROENTEROLOGY | Facility: CLINIC | Age: 68
End: 2023-03-15
Payer: MEDICARE

## 2023-03-15 VITALS
OXYGEN SATURATION: 94 % | SYSTOLIC BLOOD PRESSURE: 125 MMHG | HEIGHT: 62 IN | BODY MASS INDEX: 52.26 KG/M2 | TEMPERATURE: 95.5 F | WEIGHT: 284 LBS | DIASTOLIC BLOOD PRESSURE: 85 MMHG | HEART RATE: 99 BPM

## 2023-03-15 PROCEDURE — 99214 OFFICE O/P EST MOD 30 MIN: CPT

## 2023-03-15 NOTE — ASSESSMENT
[FreeTextEntry1] : Impression:\par \par #1 diarrhea predominant IBS–\par                      -Chronic symptoms of intermittent lower abdominal crampy pain in association with diarrhea.  Functional etiology of symptoms attributed to diarrhea predominant IBS suspected.  Associated dietary component from lactose intolerance or other dietary culprits likely contributory.  Also suspect medications may be contributory (such as from Ozempic and possibly others).  Addition, possible component of bile acid diarrhea related to prior cholecystectomy may also warrant assessment.  Of note, patient indicates possible history "Crohn's and colitis"–await records for review and confirmation although I am skeptical patient has IBD.\par                       -Improved at current time.  Still has loose stools but manageable frequency with only 1 or 2 evacuations daily.\par \par #2 dyspepsia–associated with nausea/nonbloody vomiting.  \par              -Intermittent episodes of epigastric and subxiphoid discomfort with episodic nausea and nonbloody emesis.  Possible consequence of prior laparoscopic gastric band placement and subsequent removal.  Associated component functional dyspepsia considered.\par                -EGD 9/15/2021 without significant findings (esophagus, stomach and visualized duodenum deemed normal; normal duodenal biopsy without evidence of celiac disease, Helicobacter pylori negative, no indication of erosive esophagitis or Cleveland's esophagus).\par               -Improved at current time.  No current complaints of heartburn, nausea or vomiting.\par \par #3 obesity–BMI 51.594.\par \par #4 status post laparoscopic cholecystectomy–possible history gallstone pancreatitis.\par \par #5 diverticulosis–pancolonic diverticulosis.  Colonoscopy 8//2021 noted for diverticulosis in descending colon and ascending colon.\par \par #6 history colonic polyps–per patient.  Of note, colonoscopy examinations 5/14/2014, 12/20/2017 and 8/4/2021 without detection of significant colonic polyps (no adenomas; diminutive 4 mm nonneoplastic polyp removed at 8/4/2021 colonoscopy).\par \par #7 questionable history Crohn's and colitis–reported by patient.  Colonoscopy examinations 2014, 2017 and 2021 without confirmation of IBD.\par \par General medical problems:\par \par -Hypertension.\par \par -Overactive bladder status post InterStim device.\par \par -Osteoarthritis status post bilateral THR.  Chronic knee pain.\par \par -History uterine fibroids status post uterine myomectomy.\par \par -Status post NICK/BSO (per patient–possible "precancer").\par \par -Gout.\par \par -History kidney stones status both cystoscopic removal as well as percutaneous nephrostomy for kidney stone removal.  Required repeat intervention 2/2022 and 6/2022.\par \par -History anemia.\par \par -Obstructive sleep apnea (apparently mild–does not require CPAP or other device).
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
## 2023-03-15 NOTE — HISTORY OF PRESENT ILLNESS
[FreeTextEntry1] : Office revisit on 3/15/2023.\par \par Patient presents for GI follow-up.  History of episodic abdominal pain and vomiting.\par \par Previously evaluated for office office consultation on 6/24/2022.\par \par INITIAL CONSULTATION NOTE:\par \par The patient is a 67-year-old woman evaluated for consultation regarding complaints of chronic abdominal pain, nausea, vomiting and chronic diarrhea.  PMD: Dr. Erik Dela Cruz.\par \par History per patient.  Await receipt of records for review.\par \par Patient indicates current GI symptoms mostly following laparoscopic gastric band removal in 2017.  Patient indicates history of obesity.  Underwent laparoscopic gastric band insertion in 2008.  Baseline weight was 300 pounds.  Following laparoscopic gastric band placement lost 50 pounds.\par \par Describes scenario of sense of retrosternal bolus hang up with eating following laparoscopic gastric band placement.  Was not satisfied with result and wanted gastric sleeve operation.  Underwent reoperation 2017 for laparoscopic gastric band removal.  Describes somewhat technically complicated operative course with  adhesions and other issues (per patient await prior records including operative report for review).  Was advised that gastric sleeve was not recommended based on these issues.\par \par Chronic GI symptoms since laparoscopic gastric band removal.  However, suspect some symptoms preceded this.\par \par Experiencing intermittent nausea and episodic vomiting over the past 2 years.  Indicates that "heavy food" and occasionally medications can be provocative.  Symptoms are intermittent and episodic.  Describes approximately 3 times per month may have episodes of nausea and nonbloody vomiting.\par \par Experiencing chronic abdominal pain over the past 2 to 3 years.  Has 2 different types of abdominal pain:\par -Dominant complaint is a lower abdominal crampy "labor like" intermittent pain.  Last 5 to 10 minutes.  Precipitating factors such as any relationship to eating, particular food or bowel movement not reported.  Stress perhaps provocative.  Does get relief with hyoscyamine as needed.  Can also experience this with associated left lower quadrant abdominal cramps.\par -Experiencing a separate epigastric and subxiphoid pain described more as a soreness.  Sometimes noted after vomiting.\par \par Experiences intermittent diarrhea.  Somewhat improved at the current time.  This apparently has been for the last several years.  She indicates that 1 year ago she was having 10 diarrheal stools daily.  Apparently much better now as she now describes 1 soft Greenbush 5 or Greenbush 6 bowel movement daily.  Denies rectal bleeding.  Last episode of loose diarrheal Greenbush 7 stool was 2 weeks ago.\par \par Patient indicates being told of irritable bowel syndrome, lactose intolerance and possibly Crohn's disease or some type of colitis.  Treated with hyoscyamine as needed for abdominal cramps and Lomotil as needed for diarrhea.  Not on any specific therapy for IBD.\par \par Patient had colonoscopy and upper endoscopy 1 year ago.  As noted, history was exclusive for from patient and await receipt of records including endoscopic evaluation, laboratory testing and any imaging.\par \par Describes previous history of gallstones and may have had an episode of gallstone pancreatitis.  Previously underwent laparoscopic cholecystectomy.  Does not describe needing ERCP.  Believes colonic polyps removed at prior colonoscopy.  Was told of having diverticulosis.  Patient reports no other past history of digestive illness except as noted.  Family history of colon cancer is not reported.\par \par Medical comorbidity noted for hypertension, obesity, overactive bladder (status post InterStim insertion), gout, obstructive sleep apnea (mild) and kidney stones.\par \par Current diet erratic and inconsistent.:\par -Breakfast either skips it or has a protein shake.\par -Lunch skips.\par -Dinner–typically veggie burger or something like that.  Mostly 1 meal daily.\par \par CURRENT HISTORY:\par \par ORV 11/14/2022:     -Evaluated for follow-up regarding complaints of episodic abdominal pain and diarrhea.  Still symptomatic but improved relative to assessment at time of initial consultation.  Now describing episodes as occurring as "attacks" which appear self-limited.  Although most recent "attack" was on 11/9 she indicates having been doing quite well for the previous 2 months with prior episode before this occurring in 9/2022.  Utilizing ginger which she feels helps.  Of note, during time interval between episodes she feels well with overall stable appetite and weight.  Reports no complaints of dysphagia, heartburn, nausea, vomiting or abdominal pain.  In addition, during time interval between episodes she would typically have 1 formed Greenbush 3 or Greenbush 4 bowel movement without persistence of diarrhea. \par                                   -As noted, most recent "attack" occurred on 11/9 which she indicates is somewhat stereotypical for her.  Was eating out at a Chinese restaurant and had shrimp and lo mein.  Suddenly felt onset of belching followed by initial upper abdominal discomfort which gradually moved to predominantly mid and lower abdominal labor like crampy abdominal pain.  Felt nauseous that day but without vomiting and also felt somewhat constipated feeling.  Then next day on 11/10 she took a stool softener at 10 AM and at 7 PM that night had a hard formed bowel movement.  Following days on 11/12 and 11/13 she had 3 nonbloody diarrheal Greenbush 5–7 stools.  Today so far only 1 bowel movement.  Indicates that some attacks tend to occur after fried or greasy food.  Also indicates being lactose sensitive.  In the past would utilize hyoscyamine as well as generic Lomotil as needed but did not have these available.\par                                   -Indicates having had intervention for kidney stones 2/2022 and 6/8/2022.\par \par ORV 2/3/2023:      -Presents for GI follow-up regarding history episodic abdominal pain, vomiting and diarrhea.  Had reported increased symptoms approximately 4 to 6 weeks ago.  Since then indicates symptomatic improvement.  Was able to go through left shoulder rotator cuff surgery 1 week ago without issues.  Planning on starting PT.  At current time indicates that her current diet of bland mostly soft foods is well-tolerated.  Not reporting significant dysphagia at the current time.  At present, not experiencing nausea or vomiting.  Still with upper abdominal pain.  Bowel movements currently improved with daily formed stools.  Esophagram performed 1/4/2023 posed question as to possible cricopharyngeal bar.  Somewhat patulous mid and distal esophagus noted but contrast passed quickly into the stomach.  Transient contrast stasis in mid and distal esophagus noted in association with tertiary contractions.\par \par REVIEW OF OUTSIDE RECORDS:\par                        --Last visit recorded by outpatient GI on 7/26/2021. Relevant history noted for status post laparoscopic gastric band with subsequent removal. Sleeve gastrectomy planned but unable to be performed because of excessive tissue. Previous history of diarrhea predominant IBS was treated with Xifaxan, Viberzi and cholestyramine. Episode of uncomplicated diverticulitis noted in 2017. Subsequent colonoscopy noted for diverticulosis without other findings.\par \par -EGD 9/15/2021 for indication abdominal pain. Esophagus, stomach and visualized duodenum deemed normal. Normal duodenal biopsy without evidence of celiac disease. Gastric biopsy performed with nonspecific mild chronic gastritis. Helicobacter pylori not detected. GE junction biopsy noted for mild reflux esophagitis. Cleveland's esophagus not detected.\par \par -Colonoscopy performed 8/4/2021 with indication chronic diarrhea. Hemorrhoids detected. Diverticulosis noted in descending colon and ascending colon. Diminutive 4 mm transverse colon polyp. Colonic mucosa apparently normal. Transverse colon polyp nonneoplastic. Cecum biopsy with focal mild active colitis with preserved crypt architecture. Changes deemed nonspecific. Sigmoid biopsy normal.\par \par -GI pathogen panel 7/20/2021 negative. Fecal calprotectin borderline at 113.6. No leukocytes. Lactoferrin 2.28 which was normal. Helicobacter pylori fecal antigen negative.\par \par -Colonoscopy 12/20/2017 noted for pancolonic diverticulosis and moderate hemorrhoids. Colonoscopy examination otherwise normal.\par \par -EGD 3/23/2017 with indication "rule out perforation". LA grade B esophagitis noted. Examination of stomach and visualized duodenum normal.\par \par -EGD 1/6/2016 with indication dysphagia noted for normal oropharynx, normal esophagus, regular Z-line and appearance of normally positioned laparoscopic gastric band on examination of the stomach. Small 2 mm antrum polyp removed. Visualized duodenum normal. Gastric antrum biopsy revealed mild inactive chronic gastritis. Helicobacter pylori not detected. Gastric antrum polyp revealed inflamed gastric hyperplastic polyp. GE junction biopsy mild chronic inflammation.\par \par -Colonoscopy performed 5/14/2014 with indication blood in stool. Hemorrhoids noted. Diverticulosis noted in sigmoid colon, ascending colon and cecum. Exam otherwise normal. Sigmoid biopsy noted for colonic mucosa with prominent lymphoid aggregate.\par \par ORV 3/15/2023:      -Evaluated for routine GI follow-up.  Somewhat improved at current time.  Typically has 1 or 2 bowel movements daily.  Still loose consistency with Greenbush 6 or Greenbush 7 evacuations but manageable stool frequency.  Denies rectal bleeding.  No current complaint of dysphagia, nausea or vomiting.  Experiences some intermittent abdominal cramping.  Currently utilizing generic Lomotil as needed as well as hyoscyamine 0.125 mg sublingual as needed.  Of note, on numerous supplements including magnesium 500 mg.

## 2023-06-05 ENCOUNTER — APPOINTMENT (OUTPATIENT)
Dept: GASTROENTEROLOGY | Facility: CLINIC | Age: 68
End: 2023-06-05
Payer: MEDICARE

## 2023-06-05 VITALS
HEIGHT: 62 IN | BODY MASS INDEX: 52.26 KG/M2 | WEIGHT: 284 LBS | HEART RATE: 59 BPM | SYSTOLIC BLOOD PRESSURE: 124 MMHG | DIASTOLIC BLOOD PRESSURE: 81 MMHG | TEMPERATURE: 96.3 F | OXYGEN SATURATION: 94 %

## 2023-06-05 PROCEDURE — 99213 OFFICE O/P EST LOW 20 MIN: CPT

## 2023-06-05 NOTE — HISTORY OF PRESENT ILLNESS
[FreeTextEntry1] : Office revisit on 6/5/2023.\par \par Patient presents for GI follow-up for monitoring regarding history of IBS and dyspepsia.\par \par Previously evaluated for office office consultation on 6/24/2022.\par \par INITIAL CONSULTATION NOTE:\par \par The patient is a 67-year-old woman evaluated for consultation regarding complaints of chronic abdominal pain, nausea, vomiting and chronic diarrhea.  PMD: Dr. Erik Dela Cruz.\par \par History per patient.  Await receipt of records for review.\par \par Patient indicates current GI symptoms mostly following laparoscopic gastric band removal in 2017.  Patient indicates history of obesity.  Underwent laparoscopic gastric band insertion in 2008.  Baseline weight was 300 pounds.  Following laparoscopic gastric band placement lost 50 pounds.\par \par Describes scenario of sense of retrosternal bolus hang up with eating following laparoscopic gastric band placement.  Was not satisfied with result and wanted gastric sleeve operation.  Underwent reoperation 2017 for laparoscopic gastric band removal.  Describes somewhat technically complicated operative course with  adhesions and other issues (per patient await prior records including operative report for review).  Was advised that gastric sleeve was not recommended based on these issues.\par \par Chronic GI symptoms since laparoscopic gastric band removal.  However, suspect some symptoms preceded this.\par \par Experiencing intermittent nausea and episodic vomiting over the past 2 years.  Indicates that "heavy food" and occasionally medications can be provocative.  Symptoms are intermittent and episodic.  Describes approximately 3 times per month may have episodes of nausea and nonbloody vomiting.\par \par Experiencing chronic abdominal pain over the past 2 to 3 years.  Has 2 different types of abdominal pain:\par -Dominant complaint is a lower abdominal crampy "labor like" intermittent pain.  Last 5 to 10 minutes.  Precipitating factors such as any relationship to eating, particular food or bowel movement not reported.  Stress perhaps provocative.  Does get relief with hyoscyamine as needed.  Can also experience this with associated left lower quadrant abdominal cramps.\par -Experiencing a separate epigastric and subxiphoid pain described more as a soreness.  Sometimes noted after vomiting.\par \par Experiences intermittent diarrhea.  Somewhat improved at the current time.  This apparently has been for the last several years.  She indicates that 1 year ago she was having 10 diarrheal stools daily.  Apparently much better now as she now describes 1 soft Boring 5 or Boring 6 bowel movement daily.  Denies rectal bleeding.  Last episode of loose diarrheal Boring 7 stool was 2 weeks ago.\par \par Patient indicates being told of irritable bowel syndrome, lactose intolerance and possibly Crohn's disease or some type of colitis.  Treated with hyoscyamine as needed for abdominal cramps and Lomotil as needed for diarrhea.  Not on any specific therapy for IBD.\par \par Patient had colonoscopy and upper endoscopy 1 year ago.  As noted, history was exclusive for from patient and await receipt of records including endoscopic evaluation, laboratory testing and any imaging.\par \par Describes previous history of gallstones and may have had an episode of gallstone pancreatitis.  Previously underwent laparoscopic cholecystectomy.  Does not describe needing ERCP.  Believes colonic polyps removed at prior colonoscopy.  Was told of having diverticulosis.  Patient reports no other past history of digestive illness except as noted.  Family history of colon cancer is not reported.\par \par Medical comorbidity noted for hypertension, obesity, overactive bladder (status post InterStim insertion), gout, obstructive sleep apnea (mild) and kidney stones.\par \par Current diet erratic and inconsistent.:\par -Breakfast either skips it or has a protein shake.\par -Lunch skips.\par -Dinner–typically veggie burger or something like that.  Mostly 1 meal daily.\par \par CURRENT HISTORY:\par \par ORV 11/14/2022:     -Evaluated for follow-up regarding complaints of episodic abdominal pain and diarrhea.  Still symptomatic but improved relative to assessment at time of initial consultation.  Now describing episodes as occurring as "attacks" which appear self-limited.  Although most recent "attack" was on 11/9 she indicates having been doing quite well for the previous 2 months with prior episode before this occurring in 9/2022.  Utilizing ginger which she feels helps.  Of note, during time interval between episodes she feels well with overall stable appetite and weight.  Reports no complaints of dysphagia, heartburn, nausea, vomiting or abdominal pain.  In addition, during time interval between episodes she would typically have 1 formed Boring 3 or Boring 4 bowel movement without persistence of diarrhea. \par                                   -As noted, most recent "attack" occurred on 11/9 which she indicates is somewhat stereotypical for her.  Was eating out at a [employer] restaurant and had shrimp and lo mein.  Suddenly felt onset of belching followed by initial upper abdominal discomfort which gradually moved to predominantly mid and lower abdominal labor like crampy abdominal pain.  Felt nauseous that day but without vomiting and also felt somewhat constipated feeling.  Then next day on 11/10 she took a stool softener at 10 AM and at 7 PM that night had a hard formed bowel movement.  Following days on 11/12 and 11/13 she had 3 nonbloody diarrheal Boring 5–7 stools.  Today so far only 1 bowel movement.  Indicates that some attacks tend to occur after fried or greasy food.  Also indicates being lactose sensitive.  In the past would utilize hyoscyamine as well as generic Lomotil as needed but did not have these available.\par                                   -Indicates having had intervention for kidney stones 2/2022 and 6/8/2022.\par \par ORV 2/3/2023:      -Presents for GI follow-up regarding history episodic abdominal pain, vomiting and diarrhea.  Had reported increased symptoms approximately 4 to 6 weeks ago.  Since then indicates symptomatic improvement.  Was able to go through left shoulder rotator cuff surgery 1 week ago without issues.  Planning on starting PT.  At current time indicates that her current diet of bland mostly soft foods is well-tolerated.  Not reporting significant dysphagia at the current time.  At present, not experiencing nausea or vomiting.  Still with upper abdominal pain.  Bowel movements currently improved with daily formed stools.  Esophagram performed 1/4/2023 posed question as to possible cricopharyngeal bar.  Somewhat patulous mid and distal esophagus noted but contrast passed quickly into the stomach.  Transient contrast stasis in mid and distal esophagus noted in association with tertiary contractions.\par \par REVIEW OF OUTSIDE RECORDS:\par                        --Last visit recorded by outpatient GI on 7/26/2021. Relevant history noted for status post laparoscopic gastric band with subsequent removal. Sleeve gastrectomy planned but unable to be performed because of excessive tissue. Previous history of diarrhea predominant IBS was treated with Xifaxan, Viberzi and cholestyramine. Episode of uncomplicated diverticulitis noted in 2017. Subsequent colonoscopy noted for diverticulosis without other findings.\par \par -EGD 9/15/2021 for indication abdominal pain. Esophagus, stomach and visualized duodenum deemed normal. Normal duodenal biopsy without evidence of celiac disease. Gastric biopsy performed with nonspecific mild chronic gastritis. Helicobacter pylori not detected. GE junction biopsy noted for mild reflux esophagitis. Cleveland's esophagus not detected.\par \par -Colonoscopy performed 8/4/2021 with indication chronic diarrhea. Hemorrhoids detected. Diverticulosis noted in descending colon and ascending colon. Diminutive 4 mm transverse colon polyp. Colonic mucosa apparently normal. Transverse colon polyp nonneoplastic. Cecum biopsy with focal mild active colitis with preserved crypt architecture. Changes deemed nonspecific. Sigmoid biopsy normal.\par \par -GI pathogen panel 7/20/2021 negative. Fecal calprotectin borderline at 113.6. No leukocytes. Lactoferrin 2.28 which was normal. Helicobacter pylori fecal antigen negative.\par \par -Colonoscopy 12/20/2017 noted for pancolonic diverticulosis and moderate hemorrhoids. Colonoscopy examination otherwise normal.\par \par -EGD 3/23/2017 with indication "rule out perforation". LA grade B esophagitis noted. Examination of stomach and visualized duodenum normal.\par \par -EGD 1/6/2016 with indication dysphagia noted for normal oropharynx, normal esophagus, regular Z-line and appearance of normally positioned laparoscopic gastric band on examination of the stomach. Small 2 mm antrum polyp removed. Visualized duodenum normal. Gastric antrum biopsy revealed mild inactive chronic gastritis. Helicobacter pylori not detected. Gastric antrum polyp revealed inflamed gastric hyperplastic polyp. GE junction biopsy mild chronic inflammation.\par \par -Colonoscopy performed 5/14/2014 with indication blood in stool. Hemorrhoids noted. Diverticulosis noted in sigmoid colon, ascending colon and cecum. Exam otherwise normal. Sigmoid biopsy noted for colonic mucosa with prominent lymphoid aggregate.\par \par ORV 3/15/2023:      -Evaluated for routine GI follow-up.  Somewhat improved at current time.  Typically has 1 or 2 bowel movements daily.  Still loose consistency with Boring 6 or Boring 7 evacuations but manageable stool frequency.  Denies rectal bleeding.  No current complaint of dysphagia, nausea or vomiting.  Experiences some intermittent abdominal cramping.  Currently utilizing generic Lomotil as needed as well as hyoscyamine 0.125 mg sublingual as needed.  Of note, on numerous supplements including magnesium 500 mg.\par \par ORV 6/5/2023:     -Presents for routine GI follow-up regarding history of GERD and IBS.  Indicates doing well at current time.  Improved from baseline.  Indicates recently starting ginger supplements mostly as ginger tea which she indicates provides significant symptomatic improvement.  Appetite and weight are stable.  Reports no current complaints of dysphagia, heartburn, nausea, vomiting or abdominal pain.  Only recent episode of lower abdominal cramping occurred 2 weeks ago.  Improved from baseline.  Currently has daily formed bowel movements without any current complaint of diarrhea.\par                               -Reports no change to medical history or medications since last examination.  From GI standpoint remains on omeprazole 40 mg daily, hyoscyamine 0.125 mg sublingual as needed for abdominal cramping, magnesium 500 mg and as needed Lomotil.

## 2023-06-05 NOTE — REASON FOR VISIT
[Follow-up] : a follow-up of an existing diagnosis [FreeTextEntry1] : for GI follow-up regarding history of IBS and dyspepsia.

## 2023-06-05 NOTE — ASSESSMENT
[FreeTextEntry1] : Impression:\par \par #1 diarrhea predominant IBS–\par                      -Chronic symptoms of intermittent lower abdominal crampy pain in association with diarrhea.  Functional etiology of symptoms attributed to diarrhea predominant IBS suspected.  Associated dietary component from lactose intolerance or other dietary culprits likely contributory.  Also suspect medications may be contributory (such as from Ozempic and possibly others).  Addition, possible component of bile acid diarrhea related to prior cholecystectomy may also warrant assessment.  Of note, patient indicates possible history "Crohn's and colitis"–await records for review and confirmation although I am skeptical patient has IBD.\par                       -Current (6/5/2023): Doing well at current time.  Improved from baseline.  Currently with 1 bowel movement daily.  No current complaint of diarrhea.\par \par #2 dyspepsia–associated with nausea/nonbloody vomiting.  \par              -Intermittent episodes of epigastric and subxiphoid discomfort with episodic nausea and nonbloody emesis.  Possible consequence of prior laparoscopic gastric band placement and subsequent removal.  Associated component functional dyspepsia considered.\par                -EGD 9/15/2021 without significant findings (esophagus, stomach and visualized duodenum deemed normal; normal duodenal biopsy without evidence of celiac disease, Helicobacter pylori negative, no indication of erosive esophagitis or Cleveland's esophagus).\par               -Current (6/25/2023): Reports favorable symptomatic response to hua tea and other hua supplements.  Doing well at current time.\par \par #3 obesity–BMI 51.94.\par \par #4 status post laparoscopic cholecystectomy–possible history gallstone pancreatitis.\par \par #5 diverticulosis–pancolonic diverticulosis.  Colonoscopy 8//2021 noted for diverticulosis in descending colon and ascending colon.\par \par #6 history colonic polyps–per patient.  Of note, colonoscopy examinations 5/14/2014, 12/20/2017 and 8/4/2021 without detection of significant colonic polyps (no adenomas; diminutive 4 mm nonneoplastic polyp removed at 8/4/2021 colonoscopy).\par \par #7 questionable history Crohn's and colitis–reported by patient.  Colonoscopy examinations 2014, 2017 and 2021 without confirmation of IBD.\par \par General medical problems:\par \par -Hypertension.\par \par -Overactive bladder status post InterStim device.\par \par -Osteoarthritis status post bilateral THR.  Chronic knee pain.\par \par -History uterine fibroids status post uterine myomectomy.\par \par -Status post NICK/BSO (per patient–possible "precancer").\par \par -Gout.\par \par -History kidney stones status both cystoscopic removal as well as percutaneous nephrostomy for kidney stone removal.  Required repeat intervention 2/2022 and 6/2022.\par \par -History anemia.\par \par -Obstructive sleep apnea (apparently mild–does not require CPAP or other device).
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
## 2023-06-12 ENCOUNTER — OUTPATIENT (OUTPATIENT)
Dept: OUTPATIENT SERVICES | Facility: HOSPITAL | Age: 68
LOS: 1 days | End: 2023-06-12
Payer: COMMERCIAL

## 2023-06-12 ENCOUNTER — APPOINTMENT (OUTPATIENT)
Dept: UROLOGY | Facility: CLINIC | Age: 68
End: 2023-06-12
Payer: MEDICARE

## 2023-06-12 VITALS — DIASTOLIC BLOOD PRESSURE: 84 MMHG | OXYGEN SATURATION: 96 % | HEART RATE: 72 BPM | SYSTOLIC BLOOD PRESSURE: 166 MMHG

## 2023-06-12 DIAGNOSIS — Z98.890 OTHER SPECIFIED POSTPROCEDURAL STATES: Chronic | ICD-10-CM

## 2023-06-12 DIAGNOSIS — Z98.49 CATARACT EXTRACTION STATUS, UNSPECIFIED EYE: Chronic | ICD-10-CM

## 2023-06-12 DIAGNOSIS — N32.81 OVERACTIVE BLADDER: Chronic | ICD-10-CM

## 2023-06-12 DIAGNOSIS — Z90.710 ACQUIRED ABSENCE OF BOTH CERVIX AND UTERUS: Chronic | ICD-10-CM

## 2023-06-12 DIAGNOSIS — Z98.89 OTHER SPECIFIED POSTPROCEDURAL STATES: Chronic | ICD-10-CM

## 2023-06-12 DIAGNOSIS — Z96.642 PRESENCE OF LEFT ARTIFICIAL HIP JOINT: Chronic | ICD-10-CM

## 2023-06-12 DIAGNOSIS — R35.0 FREQUENCY OF MICTURITION: ICD-10-CM

## 2023-06-12 DIAGNOSIS — Z87.442 PERSONAL HISTORY OF URINARY CALCULI: Chronic | ICD-10-CM

## 2023-06-12 DIAGNOSIS — Z96.641 PRESENCE OF RIGHT ARTIFICIAL HIP JOINT: Chronic | ICD-10-CM

## 2023-06-12 DIAGNOSIS — N20.0 CALCULUS OF KIDNEY: ICD-10-CM

## 2023-06-12 DIAGNOSIS — Z90.89 ACQUIRED ABSENCE OF OTHER ORGANS: Chronic | ICD-10-CM

## 2023-06-12 PROCEDURE — 76775 US EXAM ABDO BACK WALL LIM: CPT | Mod: 26

## 2023-06-12 PROCEDURE — 99213 OFFICE O/P EST LOW 20 MIN: CPT

## 2023-06-12 PROCEDURE — 76775 US EXAM ABDO BACK WALL LIM: CPT

## 2023-06-12 NOTE — HISTORY OF PRESENT ILLNESS
[FreeTextEntry1] : : 1955 \par Referring Provider: none \par \par HPI: Ms. KAREN PETERSON is a 67 year yo F with a PMHx notable for kidney stones previously for 7 mm in the R kidney. She underwent URS and is here today for follow up. \par \par She also reports she has colitis that is being treated with Budesonide. She has troubles with diarrhea as a result of her colitis. Her last colonoscopy was during the summer of . \par \par Her Litholink from 21 showed low urine output, low citrate, SS CaOx, and SS UA. Recs include increasing urine volume, adding citrate (borderline 460s) with lemon water supplementation and decreasing Shabbir. \par \par ULS today with no stones in L side, two collections echogenic ~5 mm on  R side in MP and LP. Plan for CT to further characterize.\par \par 11/3:\par ULS today without kidney stones, previously seen 5 mm LP stone not noted. No LL today. Has been on K cit 1 tab BID. 90% COM And 10% CP. Discussed need for LL. Also having recurrence of her OAB symptoms, has been on botox with Dr. Xie with good results, d/w Marita NP for repeat Botox. PVR today 90cc. Restart tamsulosin.\par \par :\par ULS today without kidney stones. LL discussed, low volume urine. Discussed importance of High fluid intake. The goal should be to drink enough to produce 2.5 liters (quarts) of urine daily. Most studies have shown that high fluid volume intake will decrease the risk of stone formation. Research generally indicates that there appears to be little difference between hard and soft water in the formation of kidney stones. Lemon juice added to the water may also aid with increasing urine pH, urine citric acid and reducing stone formation. Doing well, seeing Dr. Xie for OAB symptoms. \par  [Urinary Incontinence] : no urinary incontinence [Urinary Retention] : no urinary retention [Urinary Urgency] : no urinary urgency [Urinary Frequency] : no urinary frequency

## 2023-06-12 NOTE — ASSESSMENT
[FreeTextEntry1] : 69 yo F without obvious kidney stones, discussed imiportance of increasing fluids and keeping citrus intake high

## 2023-06-13 ENCOUNTER — APPOINTMENT (OUTPATIENT)
Dept: BARIATRICS/WEIGHT MGMT | Facility: CLINIC | Age: 68
End: 2023-06-13
Payer: MEDICARE

## 2023-06-13 VITALS
BODY MASS INDEX: 53.21 KG/M2 | HEART RATE: 64 BPM | HEIGHT: 62 IN | DIASTOLIC BLOOD PRESSURE: 90 MMHG | SYSTOLIC BLOOD PRESSURE: 150 MMHG | OXYGEN SATURATION: 98 % | WEIGHT: 289.13 LBS

## 2023-06-13 DIAGNOSIS — N20.0 CALCULUS OF KIDNEY: ICD-10-CM

## 2023-06-13 DIAGNOSIS — E66.01 MORBID (SEVERE) OBESITY DUE TO EXCESS CALORIES: ICD-10-CM

## 2023-06-13 PROCEDURE — 99214 OFFICE O/P EST MOD 30 MIN: CPT

## 2023-06-13 RX ORDER — SEMAGLUTIDE 0.68 MG/ML
2 INJECTION, SOLUTION SUBCUTANEOUS
Qty: 1 | Refills: 5 | Status: ACTIVE | COMMUNITY
Start: 2023-06-13 | End: 1900-01-01

## 2023-06-17 PROBLEM — E66.01 OBESITY, MORBID, BMI 50 OR HIGHER: Status: ACTIVE | Noted: 2019-02-06

## 2023-06-17 NOTE — ASSESSMENT
[FreeTextEntry1] : 69 yo woman with lap-band placement (s/p band removal March 2017), HTN, DM2, HLD, nephrolithiasis, overactive bladder, and chronic GI distress\par \par \par \par will need to lose weight prior to b/l knee replacements (under 250lbs)\par restart ozempic 0.25mg SQ weekly x 4 weeks. Plan to increase dose when tolerating\par Dietary modifications/avoid snacking/ultra processed foods/refined sugar discussed\par \par \par rtc in 1-2 months.\par

## 2023-06-17 NOTE — HISTORY OF PRESENT ILLNESS
[FreeTextEntry1] : This is a 68 year old woman returns for obesity medicine f/u\par \par Current weight 289lbs\par Pt off phentermine/obesity meds since last year. \par Limited range of motion due to knee pain. Was told she needs b/l knee surgery but needs to get weight under 250lbs prior to surgery. \par Endorses has tolerated Ozempic well in the past\par  \par Pt reports only eats around 1 full meal a day.

## 2023-06-20 RX ORDER — METFORMIN HYDROCHLORIDE 500 MG/1
500 TABLET, COATED ORAL
Qty: 60 | Refills: 5 | Status: ACTIVE | COMMUNITY
Start: 2023-06-19 | End: 1900-01-01

## 2023-06-26 ENCOUNTER — APPOINTMENT (OUTPATIENT)
Dept: UROLOGY | Facility: CLINIC | Age: 68
End: 2023-06-26
Payer: MEDICARE

## 2023-06-26 ENCOUNTER — OUTPATIENT (OUTPATIENT)
Dept: OUTPATIENT SERVICES | Facility: HOSPITAL | Age: 68
LOS: 1 days | End: 2023-06-26
Payer: COMMERCIAL

## 2023-06-26 VITALS — DIASTOLIC BLOOD PRESSURE: 88 MMHG | HEART RATE: 77 BPM | OXYGEN SATURATION: 96 % | SYSTOLIC BLOOD PRESSURE: 161 MMHG

## 2023-06-26 DIAGNOSIS — Z87.442 PERSONAL HISTORY OF URINARY CALCULI: Chronic | ICD-10-CM

## 2023-06-26 DIAGNOSIS — Z98.890 OTHER SPECIFIED POSTPROCEDURAL STATES: Chronic | ICD-10-CM

## 2023-06-26 DIAGNOSIS — Z98.49 CATARACT EXTRACTION STATUS, UNSPECIFIED EYE: Chronic | ICD-10-CM

## 2023-06-26 DIAGNOSIS — R35.0 FREQUENCY OF MICTURITION: ICD-10-CM

## 2023-06-26 DIAGNOSIS — N32.81 OVERACTIVE BLADDER: Chronic | ICD-10-CM

## 2023-06-26 DIAGNOSIS — N39.41 URGE INCONTINENCE: ICD-10-CM

## 2023-06-26 DIAGNOSIS — Z90.710 ACQUIRED ABSENCE OF BOTH CERVIX AND UTERUS: Chronic | ICD-10-CM

## 2023-06-26 DIAGNOSIS — Z96.641 PRESENCE OF RIGHT ARTIFICIAL HIP JOINT: Chronic | ICD-10-CM

## 2023-06-26 DIAGNOSIS — Z90.89 ACQUIRED ABSENCE OF OTHER ORGANS: Chronic | ICD-10-CM

## 2023-06-26 DIAGNOSIS — Z98.89 OTHER SPECIFIED POSTPROCEDURAL STATES: Chronic | ICD-10-CM

## 2023-06-26 DIAGNOSIS — Z96.642 PRESENCE OF LEFT ARTIFICIAL HIP JOINT: Chronic | ICD-10-CM

## 2023-06-26 PROCEDURE — 52287 CYSTOSCOPY CHEMODENERVATION: CPT

## 2023-06-26 RX ORDER — ONABOTULINUMTOXINA 100 [USP'U]/1
100 INJECTION, POWDER, LYOPHILIZED, FOR SOLUTION INTRADERMAL; INTRAMUSCULAR
Qty: 1 | Refills: 0 | Status: COMPLETED | OUTPATIENT
Start: 2023-06-26 | End: 2023-06-26

## 2023-06-26 RX ORDER — ONABOTULINUMTOXINA 100 [USP'U]/1
100 INJECTION, POWDER, LYOPHILIZED, FOR SOLUTION INTRADERMAL; INTRAMUSCULAR
Qty: 1 | Refills: 0 | Status: COMPLETED | OUTPATIENT
Start: 2023-06-26

## 2023-06-26 RX ORDER — SULFAMETHOXAZOLE AND TRIMETHOPRIM 800; 160 MG/1; MG/1
800-160 TABLET ORAL TWICE DAILY
Qty: 10 | Refills: 0 | Status: ACTIVE | COMMUNITY
Start: 2023-06-26 | End: 1900-01-01

## 2023-06-26 RX ADMIN — ONABOTULINUMTOXINA 0 UNIT: 100 INJECTION, POWDER, LYOPHILIZED, FOR SOLUTION INTRADERMAL; INTRAMUSCULAR at 00:00

## 2023-06-27 DIAGNOSIS — N39.41 URGE INCONTINENCE: ICD-10-CM

## 2023-06-28 LAB — BACTERIA UR CULT: NORMAL

## 2023-07-05 ENCOUNTER — APPOINTMENT (OUTPATIENT)
Dept: ORTHOPEDIC SURGERY | Facility: CLINIC | Age: 68
End: 2023-07-05
Payer: MEDICARE

## 2023-07-05 VITALS
DIASTOLIC BLOOD PRESSURE: 85 MMHG | SYSTOLIC BLOOD PRESSURE: 122 MMHG | HEIGHT: 62 IN | BODY MASS INDEX: 52.26 KG/M2 | OXYGEN SATURATION: 84 % | WEIGHT: 284 LBS

## 2023-07-05 PROCEDURE — 20610 DRAIN/INJ JOINT/BURSA W/O US: CPT | Mod: LT

## 2023-07-05 PROCEDURE — 99214 OFFICE O/P EST MOD 30 MIN: CPT | Mod: 25

## 2023-07-05 PROCEDURE — 73564 X-RAY EXAM KNEE 4 OR MORE: CPT | Mod: RT

## 2023-07-05 RX ORDER — LIDOCAINE HYDROCHLORIDE 5 MG/ML
0.5 INJECTION, SOLUTION INFILTRATION; PERINEURAL
Qty: 0 | Refills: 0 | Status: COMPLETED | OUTPATIENT
Start: 2023-07-05

## 2023-07-05 RX ORDER — METHYLPRED ACET/NACL,ISO-OS/PF 40 MG/ML
40 VIAL (ML) INJECTION
Qty: 1 | Refills: 0 | Status: COMPLETED | OUTPATIENT
Start: 2023-07-05

## 2023-07-05 RX ADMIN — METHYLPREDNISOLONE ACETATE 1 MG/ML: 40 INJECTION, SUSPENSION INTRA-ARTICULAR; INTRALESIONAL; INTRAMUSCULAR; SOFT TISSUE at 00:00

## 2023-07-05 RX ADMIN — LIDOCAINE HYDROCHLORIDE 4 %: 5 INJECTION, SOLUTION INFILTRATION; PERINEURAL at 00:00

## 2023-07-05 NOTE — PHYSICAL EXAM
[Antalgic] : antalgic [Shuffling] : shuffling [LE] : Sensory: Intact in bilateral lower extremities [DP] : dorsalis pedis 2+ and symmetric bilaterally [PT] : posterior tibial 2+ and symmetric bilaterally [Normal RLE] : Right Lower Extremity: No scars, rashes, lesions, ulcers, skin intact [Normal LLE] : Left Lower Extremity: No scars, rashes, lesions, ulcers, skin intact [Normal Touch] : sensation intact for touch [Obese] : obese [Normal] : no peripheral adenopathy appreciated [de-identified] : X-rays were reviewed before the examination.  Patient's BMI is calculated at 51.9 [de-identified] : Patient has sensation and pulses in both lower extremities good strength against resistance in EHL and dorsiflexion the right knee comes to full extension and is able to flex approximately 110 degrees somewhat limited by body habitus.  The left knee has about 5 degrees of flexion contracture also bends to approximately 100 degrees.  Patient has no discomfort with internal and external rotation of the hips.  Both knees show a significant valgus angulation of approximately 7 degrees [de-identified] : BMI is 59 [de-identified] : Imaging: Standing 4 views of the left knee show left knee severe arthritis worse in the lateral compartment with loss of joint space, subchondral sclerosis, marginal osteophyte formations, and subchondral cyst formation. There are no signs of fracture. There is no  knee effusion. The soft tissues appear unremarkable\par \par Imaging: Standing 4 views of the right knee show right knee severe arthritis worse in the lateral compartment with loss of joint space, subchondral sclerosis, marginal osteophyte formations, and subchondral cyst formation. There are no signs of fracture. There is no  knee effusion. The soft tissues appear unremarkable\par
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
## 2023-07-05 NOTE — PROCEDURE
[de-identified] : Injection: Right knee joint. Indication: Osteoarthritis.  \par A discussion was had with the patient regarding this procedure and all questions were answered. All risks, benefits and alternatives were discussed. These included but were not limited to bleeding, infection, allergic reaction and reaccumulation of fluid. A timeout was done to ensure correct side and patient agreed to the procedure.  A [place] bernadette was created on the skin utilizing a plastic needle cap to bernadette the anticipated point of entry. Alcohol was used to clean the skin, and chloraprep was used to sterilize and prep the area in the lateral joint line aspect of the knee. Ethyl chloride spray was then used as a topical anesthetic. A 22-gauge needle was used to inject 4cc 1% lidocaine without epinephrine  and 1cc of 40mg/ml methylprednisolone into the knee. A sterile bandage was then applied. The patient tolerated the procedure well. \par \par Injection: Left knee joint. Indication: Osteoarthritis.  \par A discussion was had with the patient regarding this procedure and all questions were answered. All risks, benefits and alternatives were discussed. These included but were not limited to bleeding, infection, allergic reaction and reaccumulation of fluid. A timeout was done to ensure correct side and patient agreed to the procedure.  A [place] bernadette was created on the skin utilizing a plastic needle cap to bernadette the anticipated point of entry. Alcohol was used to clean the skin, and chloraprep was used to sterilize and prep the area in the lateral joint line aspect of the knee. Ethyl chloride spray was then used as a topical anesthetic. A 22-gauge needle was used to inject 4cc 1% lidocaine without epinephrine,  and 1cc of 40mg/ml methylprednisolone into the knee. A sterile bandage was then applied. The patient tolerated the procedure well.\par

## 2023-07-05 NOTE — DISCUSSION/SUMMARY
[Surgical risks reviewed] : Surgical risks reviewed [de-identified] : I discussed nonoperative and operative treatment options for their arthritis. We discussed nonoperative treatments options including activity modification, therapy, gait aides, nonsteroidal anti-inflammatory medications, and injections. I explained to her that given her BMI she is at a significantly elevated risk of complications including infection. I explained that a prosthetic joint infection is a devastating complication that could lead to amputation of the limb. I also explained that the lifetime of the implant will be significantly shortened at higher weights. The patient would like to continue with nonoperative treatment of their arthritis and would like to proceed with activity modification and weight loss, physical therapy, radiofrequency ablation of bilateral knees. Our goal is for her to get down to a weight closer to 240lbs to decrease her risk of complications and improve her knee pain.\par \par We discussed that when nonoperative treatment is no longer successful and their pain is severe enough that it is affecting their ability to perform activities of daily living, a joint replacement may help them.\par

## 2023-07-10 ENCOUNTER — APPOINTMENT (OUTPATIENT)
Dept: UROLOGY | Facility: CLINIC | Age: 68
End: 2023-07-10

## 2023-07-21 ENCOUNTER — APPOINTMENT (OUTPATIENT)
Dept: ORTHOPEDIC SURGERY | Facility: CLINIC | Age: 68
End: 2023-07-21
Payer: MEDICARE

## 2023-07-21 VITALS — DIASTOLIC BLOOD PRESSURE: 110 MMHG | SYSTOLIC BLOOD PRESSURE: 166 MMHG | HEART RATE: 93 BPM

## 2023-07-21 PROCEDURE — 99213 OFFICE O/P EST LOW 20 MIN: CPT

## 2023-07-21 NOTE — HISTORY OF PRESENT ILLNESS
[de-identified] : KAREN PETERSON  is an 68 year female  presents today for follow-up of left greater than right knee pain. At our last visit they had bilateral knee pain and underwent injections which mildly improve the right knee but did not help the left knee much.\par \par No fever, chills, or signs of infection. Today, patient does not state any other associated signs or complaints outside of those described. \par \par A complete review of symptoms as well as past medical/surgical history, medications, allergies, social and family history, and other details of HPI and exam were reviewed per first visit intake form and updated accordingly. Additional and more relevant details are noted in further detail today.\par

## 2023-07-21 NOTE — DISCUSSION/SUMMARY
[Surgical risks reviewed] : Surgical risks reviewed [de-identified] : I discussed nonoperative and operative treatment options for their arthritis. We discussed nonoperative treatments options including activity modification, therapy, gait aides, nonsteroidal anti-inflammatory medications, and injections. I explained to her that given her BMI she is at a significantly elevated risk of complications including infection. I explained that a prosthetic joint infection is a devastating complication that could lead to amputation of the limb. I also explained that the lifetime of the implant will be significantly shortened at higher weights. The patient would like to continue with nonoperative treatment of their arthritis and would like to proceed with activity modification and weight loss, physical therapy, radiofrequency ablation of bilateral knees. Our goal is for her to get down to a weight closer to 240lbs to decrease her risk of complications and improve her knee pain.\par \par We discussed that when nonoperative treatment is no longer successful and their pain is severe enough that it is affecting their ability to perform activities of daily living, a joint replacement may help them.\par

## 2023-07-21 NOTE — PHYSICAL EXAM
[Antalgic] : antalgic [Shuffling] : shuffling [LE] : Sensory: Intact in bilateral lower extremities [DP] : dorsalis pedis 2+ and symmetric bilaterally [PT] : posterior tibial 2+ and symmetric bilaterally [Normal RLE] : Right Lower Extremity: No scars, rashes, lesions, ulcers, skin intact [Normal LLE] : Left Lower Extremity: No scars, rashes, lesions, ulcers, skin intact [Normal Touch] : sensation intact for touch [Obese] : obese [Normal] : no peripheral adenopathy appreciated [de-identified] : Patient has sensation and pulses in both lower extremities good strength against resistance in EHL and dorsiflexion the right knee comes to full extension and is able to flex approximately 110 degrees somewhat limited by body habitus.  The left knee has about 5 degrees of flexion contracture also bends to approximately 100 degrees.  Patient has no discomfort with internal and external rotation of the hips.  Both knees show a significant valgus angulation of approximately 7 degrees [de-identified] : X-rays were reviewed before the examination.  Patient's BMI is calculated at 51.9 [de-identified] : BMI is 59

## 2023-07-27 RX ORDER — PHENTERMINE HYDROCHLORIDE 37.5 MG/1
37.5 TABLET ORAL
Qty: 30 | Refills: 0 | Status: ACTIVE | COMMUNITY
Start: 2023-06-19 | End: 1900-01-01

## 2023-08-18 RX ORDER — IBUPROFEN 800 MG/1
800 TABLET, FILM COATED ORAL 3 TIMES DAILY
Qty: 30 | Refills: 0 | Status: ACTIVE | COMMUNITY
Start: 2023-07-12 | End: 1900-01-01

## 2023-08-25 ENCOUNTER — APPOINTMENT (OUTPATIENT)
Dept: ORTHOPEDIC SURGERY | Facility: CLINIC | Age: 68
End: 2023-08-25
Payer: MEDICARE

## 2023-08-25 VITALS — DIASTOLIC BLOOD PRESSURE: 104 MMHG | HEART RATE: 91 BPM | SYSTOLIC BLOOD PRESSURE: 154 MMHG

## 2023-08-25 PROCEDURE — 99214 OFFICE O/P EST MOD 30 MIN: CPT

## 2023-08-25 RX ORDER — DICLOFENAC SODIUM 1% 10 MG/G
1 GEL TOPICAL TWICE DAILY
Qty: 1 | Refills: 1 | Status: ACTIVE | COMMUNITY
Start: 2023-08-25 | End: 1900-01-01

## 2023-08-25 NOTE — DISCUSSION/SUMMARY
[de-identified] : We discussed that she does have severe arthritis however she has not been optimized for surgery.  We discussed nonoperative treatments including an anti-inflammatory gel to decrease pain.  This was sent to her pharmacy.  She has previously undergone lap band surgery, was denied radiofrequency ablation of the nerves, and failed corticosteroid injection.  However, I discussed that their morbid obesity put them at a significant increased risk of infection, wound healing issue, early implant failure. I discussed that morbid obesity is a modifiable risk factor and that they must lose weight to decrease their risk of complications after total joint arthroplasty. I have referred them to the  bariatric surgery team and encouraged them to discuss this further with their PCP. I will see the patient back in 8 weeks to monitor their progress.

## 2023-08-25 NOTE — PHYSICAL EXAM
[de-identified] : General Appearance / Station: Well developed, well nourished, in no acute distress  Orientation: Oriented to person, place, and time Gait & Station: Ambulates with with a walker for assistive device Neurologic: Normal leg sensation  Cardiovascular: Warm extremity  Lymphatics: No lymphedema  Generalized Ligament Laxity: Normal  Stiffness: Normal  SYMPTOMATIC RIGHT KNEE: Alignment: Valgus Skin: normal Effusion: none . Quadriceps: normal . Range of motion: symmetric but painful . PF crepitus: 1+. PF apprehension: none . Patella / Patella Tendon: nontender . Lachman's: negative  Valgus @ 30: negative. Varus @ 30: negative. Posterior drawer: negative. Palpation: TENDER AT medial lateral joint line SYMPTOMATIC LEFT KNEE: Alignment: Valgus Skin: normal Effusion: none . Quadriceps: normal . Range of motion: symmetric but painful . PF crepitus: 1+. PF apprehension: none . Patella / Patella Tendon: nontender . Lachman's: negative  Valgus @ 30: negative. Varus @ 30: negative. Posterior drawer: negative. Palpation: TENDER AT medial and lateral joint line Meniscus signs: MEDIAL LATERAL JOINT LINE

## 2023-08-25 NOTE — HISTORY OF PRESENT ILLNESS
[de-identified] : KAREN PETERSON  is an 68 year female  presents today for follow-up of bilateral left greater than right knee pain. At our last visit they had discussed weight loss.  They are scheduled undergo right rotator cuff repair later this month..  She has previously attempted steroid injections which did not help.  She was sent for radiofrequency ablation but was denied by insurance.  I explained that she is at too high of a BMI in order to undergo surgery.   No fever, chills, or signs of infection. Today, patient does not state any other associated signs or complaints outside of those described.   A complete review of symptoms as well as past medical/surgical history, medications, allergies, social and family history, and other details of HPI and exam were reviewed per first visit intake form and updated accordingly. Additional and more relevant details are noted in further detail today.

## 2023-09-19 NOTE — ED ADULT NURSE NOTE - CAS TRG GEN SKIN COLOR
Normal for race
You can access the FollowMyHealth Patient Portal offered by [facility] by registering at the following website: [email or web address]. By joining [email or web address]’s FollowMyHealth portal, you will also be able to view your health information using other applications (apps) compatible with our system.

## 2023-09-20 ENCOUNTER — APPOINTMENT (OUTPATIENT)
Dept: GASTROENTEROLOGY | Facility: CLINIC | Age: 68
End: 2023-09-20
Payer: MEDICARE

## 2023-09-20 VITALS
DIASTOLIC BLOOD PRESSURE: 85 MMHG | SYSTOLIC BLOOD PRESSURE: 153 MMHG | HEART RATE: 80 BPM | BODY MASS INDEX: 51.89 KG/M2 | OXYGEN SATURATION: 98 % | HEIGHT: 62 IN | WEIGHT: 282 LBS

## 2023-09-20 PROCEDURE — 99213 OFFICE O/P EST LOW 20 MIN: CPT

## 2023-09-20 RX ORDER — DIPHENOXYLATE HYDROCHLORIDE AND ATROPINE SULFATE 2.5; .025 MG/1; MG/1
2.5-0.025 TABLET ORAL
Qty: 240 | Refills: 5 | Status: ACTIVE | COMMUNITY
Start: 2023-09-20 | End: 1900-01-01

## 2023-09-20 RX ORDER — HYOSCYAMINE SULFATE 0.12 MG/1
0.12 TABLET SUBLINGUAL 4 TIMES DAILY
Qty: 120 | Refills: 1 | Status: ACTIVE | COMMUNITY
Start: 2023-09-20 | End: 1900-01-01

## 2023-10-06 ENCOUNTER — APPOINTMENT (OUTPATIENT)
Dept: ORTHOPEDIC SURGERY | Facility: CLINIC | Age: 68
End: 2023-10-06

## 2023-10-30 NOTE — REASON FOR VISIT
Subjective   Patient ID: Weston Carty is a 8 y.o. male who presents for OTHER (Here with mom Manda Dumont/ R knee injury 9/27 another injury 10/29 during soccer/ OTC ibuprofen ).    HPI   Rt knee was hurt- bad soccer fall- swelling/pain/out of soccer x 10 days- this happened about 4 weeks ago. Was normal after 10 days    Then yesterday someone's knee knocked into his rt knee- came out for 10 minutes and was able to return to play. Then the ball hit directly same knee and had to be piggy backed off the field  Parents have given motrin  Aggressive icing  Swelling mostly down  Can walk and even trot. Full ROM< though there is still some pain (5/5 yesterday, 2.5/5 today)    Review of Systems    Objective   Temp 36.6 °C (97.8 °F) (Tympanic)   Wt 29.3 kg     Physical Exam  Constitutional:       Appearance: Normal appearance.   Cardiovascular:      Rate and Rhythm: Normal rate.      Heart sounds: Normal heart sounds. No murmur heard.  Pulmonary:      Effort: No respiratory distress.      Breath sounds: Normal breath sounds.   Musculoskeletal:      Comments: Rt knee points to medial femoral condyle as site of injury/pain. Mild swelling same area. Knee joint itself is not swollen. FROM, stable. No bruising, walking ok also trot/small jog without antalgic gait    Skin:     Findings: No rash.   Neurological:      Mental Status: He is alert.         Assessment/Plan   Diagnoses and all orders for this visit:  Knee injury, right, initial encounter  Ice, ace wrap, off soccer till pain free. If not back to soccer in 10 days will send to ortho (info provided)          [Follow-up] : a follow-up of an existing diagnosis [FreeTextEntry1] : for GI follow-up.

## 2023-12-05 ENCOUNTER — NON-APPOINTMENT (OUTPATIENT)
Age: 68
End: 2023-12-05

## 2023-12-06 ENCOUNTER — APPOINTMENT (OUTPATIENT)
Dept: GASTROENTEROLOGY | Facility: CLINIC | Age: 68
End: 2023-12-06

## 2024-01-09 NOTE — ED ADULT NURSE NOTE - NS AS WEIGHT METHOD - PEDI/INFANT
Patient had Dr. Whitten sign parking placard form last year. He mailed it to [employer] but has not heard from them yet. He is asking for Dr. Whitten to sign a new form and mail to house.   stated

## 2024-03-18 ENCOUNTER — APPOINTMENT (OUTPATIENT)
Dept: UROLOGY | Facility: CLINIC | Age: 69
End: 2024-03-18

## 2024-03-19 ENCOUNTER — APPOINTMENT (OUTPATIENT)
Dept: UROLOGY | Facility: CLINIC | Age: 69
End: 2024-03-19

## 2024-03-20 ENCOUNTER — APPOINTMENT (OUTPATIENT)
Dept: GASTROENTEROLOGY | Facility: CLINIC | Age: 69
End: 2024-03-20
Payer: MEDICARE

## 2024-03-20 VITALS
HEART RATE: 83 BPM | BODY MASS INDEX: 53.92 KG/M2 | HEIGHT: 62 IN | SYSTOLIC BLOOD PRESSURE: 122 MMHG | WEIGHT: 293 LBS | DIASTOLIC BLOOD PRESSURE: 75 MMHG | OXYGEN SATURATION: 98 %

## 2024-03-20 DIAGNOSIS — R10.13 EPIGASTRIC PAIN: ICD-10-CM

## 2024-03-20 DIAGNOSIS — E66.01 MORBID (SEVERE) OBESITY DUE TO EXCESS CALORIES: ICD-10-CM

## 2024-03-20 DIAGNOSIS — R10.31 RIGHT LOWER QUADRANT PAIN: ICD-10-CM

## 2024-03-20 DIAGNOSIS — R10.32 RIGHT LOWER QUADRANT PAIN: ICD-10-CM

## 2024-03-20 PROCEDURE — 99213 OFFICE O/P EST LOW 20 MIN: CPT

## 2024-03-20 NOTE — ASSESSMENT
[FreeTextEntry1] : Impression:  #1 diarrhea predominant IBS-                      -Chronic symptoms of intermittent lower abdominal crampy pain in association with diarrhea.  Functional etiology of symptoms attributed to diarrhea predominant IBS suspected.  Associated dietary component from lactose intolerance or other dietary culprits likely contributory.  Also suspect medications may be contributory (such as from Ozempic and possibly others).  Addition, possible component of bile acid diarrhea related to prior cholecystectomy may also warrant assessment.  Of note, patient indicates possible history "Crohn's and colitis"-await records for review and confirmation although I am skeptical patient has IBD.                       -Current (3/20/2024): Significantly improved at current time.  Diarrhea improved-currently 1 soft mostly [place] 6 bowel movement daily.  Not requiring Lomotil.  Less abdominal pain and discomfort-not requiring hyoscyamine.  #2 dyspepsia-associated with nausea/nonbloody vomiting.                -Intermittent episodes of epigastric and subxiphoid discomfort with episodic nausea and nonbloody emesis.  Possible consequence of prior laparoscopic gastric band placement and subsequent removal.  Associated component functional dyspepsia considered.                -EGD 9/15/2021 without significant findings (esophagus, stomach and visualized duodenum deemed normal; normal duodenal biopsy without evidence of celiac disease, Helicobacter pylori negative, no indication of erosive esophagitis or [place]'s esophagus).               -Current (3/20/2024): Improved at current time.  No current heartburn, nausea or vomiting.  Main GI symptom at current time is belching.  #3 obesity-BMI 53.59.  #4 status post laparoscopic cholecystectomy-possible history gallstone pancreatitis.  #5 diverticulosis-pancolonic diverticulosis.  Colonoscopy 8//2021 noted for diverticulosis in descending colon and ascending colon.  #6 history colonic polyps-per patient.  Of note, colonoscopy examinations 5/14/2014, 12/20/2017 and 8/4/2021 without detection of significant colonic polyps (no adenomas; diminutive 4 mm nonneoplastic polyp removed at 8/4/2021 colonoscopy).  #7 questionable history Crohn's and colitis-reported by patient.  Colonoscopy examinations 2014, 2017 and 2021 without confirmation of IBD.  General medical problems:  -Hypertension.  -Overactive bladder status post InterStim device.  -Osteoarthritis status post bilateral THR.  Chronic knee pain.  -History uterine fibroids status post uterine myomectomy.  -Status post NICK/BSO (per patient-possible "precancer").  -Gout.  -History kidney stones status both cystoscopic removal as well as percutaneous nephrostomy for kidney stone removal.  Required repeat intervention 2/2022 and 6/2022.  -History anemia.  -Obstructive sleep apnea (apparently mild-does not require CPAP or other device).
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
## 2024-03-20 NOTE — ADDENDUM
[FreeTextEntry1] : Plan:  #1 current GI status discussed.  Overall, improved.  Significantly better from standpoint of chronic dyspepsia and IBS symptoms.  #2 antireflux precautions.  #3 omeprazole 40 mg daily (prophylactic antisecretory therapy in view of taking multiple NSAIDs).  #4 lactose-free diet.  #5 maintain previously discussed dietary recommendations of small frequent meals.  #6 Lomotil 1-2 tabs every 6 hours as needed in the event of increased severity of diarrhea.  #7 hyoscyamine 0.125 mg with administration of 1-2 tabs sublingual every 6 hours as needed for abdominal cramping.  #8 screening colonoscopy advised for 8/2031.  #9 office revisit in 6 months.

## 2024-03-20 NOTE — HISTORY OF PRESENT ILLNESS
[FreeTextEntry1] : Office revisit on 3/20/2024.  Patient presents for GI follow-up for monitoring regarding history of IBS and dyspepsia.  Previously evaluated for office office consultation on 6/24/2022.  INITIAL CONSULTATION NOTE:  The patient is a 67-year-old woman evaluated for consultation regarding complaints of chronic abdominal pain, nausea, vomiting and chronic diarrhea.  PMD: Dr. Erik Dela Cruz.  History per patient.  Await receipt of records for review.  Patient indicates current GI symptoms mostly following laparoscopic gastric band removal in 2017.  Patient indicates history of obesity.  Underwent laparoscopic gastric band insertion in 2008.  Baseline weight was 300 pounds.  Following laparoscopic gastric band placement lost 50 pounds.  Describes scenario of sense of retrosternal bolus hang up with eating following laparoscopic gastric band placement.  Was not satisfied with result and wanted gastric sleeve operation.  Underwent reoperation 2017 for laparoscopic gastric band removal.  Describes somewhat technically complicated operative course with  adhesions and other issues (per patient await prior records including operative report for review).  Was advised that gastric sleeve was not recommended based on these issues.  Chronic GI symptoms since laparoscopic gastric band removal.  However, suspect some symptoms preceded this.  Experiencing intermittent nausea and episodic vomiting over the past 2 years.  Indicates that "heavy food" and occasionally medications can be provocative.  Symptoms are intermittent and episodic.  Describes approximately 3 times per month may have episodes of nausea and nonbloody vomiting.  Experiencing chronic abdominal pain over the past 2 to 3 years.  Has 2 different types of abdominal pain: -Dominant complaint is a lower abdominal crampy "labor like" intermittent pain.  Last 5 to 10 minutes.  Precipitating factors such as any relationship to eating, particular food or bowel movement not reported.  Stress perhaps provocative.  Does get relief with hyoscyamine as needed.  Can also experience this with associated left lower quadrant abdominal cramps. -Experiencing a separate epigastric and subxiphoid pain described more as a soreness.  Sometimes noted after vomiting.  Experiences intermittent diarrhea.  Somewhat improved at the current time.  This apparently has been for the last several years.  She indicates that 1 year ago she was having 10 diarrheal stools daily.  Apparently much better now as she now describes 1 soft Bartholomew 5 or Bartholomew 6 bowel movement daily.  Denies rectal bleeding.  Last episode of loose diarrheal Bartholomew 7 stool was 2 weeks ago.  Patient indicates being told of irritable bowel syndrome, lactose intolerance and possibly Crohn's disease or some type of colitis.  Treated with hyoscyamine as needed for abdominal cramps and Lomotil as needed for diarrhea.  Not on any specific therapy for IBD.  Patient had colonoscopy and upper endoscopy 1 year ago.  As noted, history was exclusive for from patient and await receipt of records including endoscopic evaluation, laboratory testing and any imaging.  Describes previous history of gallstones and may have had an episode of gallstone pancreatitis.  Previously underwent laparoscopic cholecystectomy.  Does not describe needing ERCP.  Believes colonic polyps removed at prior colonoscopy.  Was told of having diverticulosis.  Patient reports no other past history of digestive illness except as noted.  Family history of colon cancer is not reported.  Medical comorbidity noted for hypertension, obesity, overactive bladder (status post InterStim insertion), gout, obstructive sleep apnea (mild) and kidney stones.  Current diet erratic and inconsistent.: -Breakfast either skips it or has a protein shake. -Lunch skips. -Dinner-typically veggie burger or something like that.  Mostly 1 meal daily.  CURRENT HISTORY:  ORV 11/14/2022:     -Evaluated for follow-up regarding complaints of episodic abdominal pain and diarrhea.  Still symptomatic but improved relative to assessment at time of initial consultation.  Now describing episodes as occurring as "attacks" which appear self-limited.  Although most recent "attack" was on 11/9 she indicates having been doing quite well for the previous 2 months with prior episode before this occurring in 9/2022.  Utilizing ginger which she feels helps.  Of note, during time interval between episodes she feels well with overall stable appetite and weight.  Reports no complaints of dysphagia, heartburn, nausea, vomiting or abdominal pain.  In addition, during time interval between episodes she would typically have 1 formed Bartholomew 3 or Bartholomew 4 bowel movement without persistence of diarrhea.                                    -As noted, most recent "attack" occurred on 11/9 which she indicates is somewhat stereotypical for her.  Was eating out at a Chinese restaurant and had shrimp and lo mein.  Suddenly felt onset of belching followed by initial upper abdominal discomfort which gradually moved to predominantly mid and lower abdominal labor like crampy abdominal pain.  Felt nauseous that day but without vomiting and also felt somewhat constipated feeling.  Then next day on 11/10 she took a stool softener at 10 AM and at 7 PM that night had a hard formed bowel movement.  Following days on 11/12 and 11/13 she had 3 nonbloody diarrheal Bartholomew 5-7 stools.  Today so far only 1 bowel movement.  Indicates that some attacks tend to occur after fried or greasy food.  Also indicates being lactose sensitive.  In the past would utilize hyoscyamine as well as generic Lomotil as needed but did not have these available.                                   -Indicates having had intervention for kidney stones 2/2022 and 6/8/2022.  ORV 2/3/2023:      -Presents for GI follow-up regarding history episodic abdominal pain, vomiting and diarrhea.  Had reported increased symptoms approximately 4 to 6 weeks ago.  Since then indicates symptomatic improvement.  Was able to go through left shoulder rotator cuff surgery 1 week ago without issues.  Planning on starting PT.  At current time indicates that her current diet of bland mostly soft foods is well-tolerated.  Not reporting significant dysphagia at the current time.  At present, not experiencing nausea or vomiting.  Still with upper abdominal pain.  Bowel movements currently improved with daily formed stools.  Esophagram performed 1/4/2023 posed question as to possible cricopharyngeal bar.  Somewhat patulous mid and distal esophagus noted but contrast passed quickly into the stomach.  Transient contrast stasis in mid and distal esophagus noted in association with tertiary contractions.  REVIEW OF OUTSIDE RECORDS:                        --Last visit recorded by outpatient GI on 7/26/2021. Relevant history noted for status post laparoscopic gastric band with subsequent removal. Sleeve gastrectomy planned but unable to be performed because of excessive tissue. Previous history of diarrhea predominant IBS was treated with Xifaxan, Viberzi and cholestyramine. Episode of uncomplicated diverticulitis noted in 2017. Subsequent colonoscopy noted for diverticulosis without other findings.  -EGD 9/15/2021 for indication abdominal pain. Esophagus, stomach and visualized duodenum deemed normal. Normal duodenal biopsy without evidence of celiac disease. Gastric biopsy performed with nonspecific mild chronic gastritis. Helicobacter pylori not detected. GE junction biopsy noted for mild reflux esophagitis. Cleveland's esophagus not detected.  -Colonoscopy performed 8/4/2021 with indication chronic diarrhea. Hemorrhoids detected. Diverticulosis noted in descending colon and ascending colon. Diminutive 4 mm transverse colon polyp. Colonic mucosa apparently normal. Transverse colon polyp nonneoplastic. Cecum biopsy with focal mild active colitis with preserved crypt architecture. Changes deemed nonspecific. Sigmoid biopsy normal.  -GI pathogen panel 7/20/2021 negative. Fecal calprotectin borderline at 113.6. No leukocytes. Lactoferrin 2.28 which was normal. Helicobacter pylori fecal antigen negative.  -Colonoscopy 12/20/2017 noted for pancolonic diverticulosis and moderate hemorrhoids. Colonoscopy examination otherwise normal.  -EGD 3/23/2017 with indication "rule out perforation". LA grade B esophagitis noted. Examination of stomach and visualized duodenum normal.  -EGD 1/6/2016 with indication dysphagia noted for normal oropharynx, normal esophagus, regular Z-line and appearance of normally positioned laparoscopic gastric band on examination of the stomach. Small 2 mm antrum polyp removed. Visualized duodenum normal. Gastric antrum biopsy revealed mild inactive chronic gastritis. Helicobacter pylori not detected. Gastric antrum polyp revealed inflamed gastric hyperplastic polyp. GE junction biopsy mild chronic inflammation.  -Colonoscopy performed 5/14/2014 with indication blood in stool. Hemorrhoids noted. Diverticulosis noted in sigmoid colon, ascending colon and cecum. Exam otherwise normal. Sigmoid biopsy noted for colonic mucosa with prominent lymphoid aggregate.  ORV 3/15/2023:      -Evaluated for routine GI follow-up.  Somewhat improved at current time.  Typically has 1 or 2 bowel movements daily.  Still loose consistency with Bartholomew 6 or Bartholomew 7 evacuations but manageable stool frequency.  Denies rectal bleeding.  No current complaint of dysphagia, nausea or vomiting.  Experiences some intermittent abdominal cramping.  Currently utilizing generic Lomotil as needed as well as hyoscyamine 0.125 mg sublingual as needed.  Of note, on numerous supplements including magnesium 500 mg.  ORV 6/5/2023:     -Presents for routine GI follow-up regarding history of GERD and IBS.  Indicates doing well at current time.  Improved from baseline.  Indicates recently starting ginger supplements mostly as ginger tea which she indicates provides significant symptomatic improvement.  Appetite and weight are stable.  Reports no current complaints of dysphagia, heartburn, nausea, vomiting or abdominal pain.  Only recent episode of lower abdominal cramping occurred 2 weeks ago.  Improved from baseline.  Currently has daily formed bowel movements without any current complaint of diarrhea.                               -Reports no change to medical history or medications since last examination.  From GI standpoint remains on omeprazole 40 mg daily, hyoscyamine 0.125 mg sublingual as needed for abdominal cramping, magnesium 500 mg and as needed Lomotil.  ORV 9/20/2023:      -Evaluated for routine GI follow-up.  History of GERD, IBS and chronic diarrhea.  Experiencing intermittent diarrhea of varying severity.  Typically between 1-3 diarrheal episodes daily.  Never observes blood.  Denies fever.  Appetite and weight without change.  Denies complaints of dysphagia or heartburn.  Previously on omeprazole but stopped on her own.  Experiences intermittent nausea and episodes of nonbloody emesis often provoked after medications but at other times as well.  Experiences intermittent abdominal cramps responsive to hyoscyamine as needed.  Also utilizing as needed Lomotil.  Has numerous other somatic complaints especially musculoskeletal symptoms and indicates plan for right shoulder rotator cuff repair in 9/28/2023.  Has bilateral knee pain and if able to achieve adequate weight loss will be planned for TKR.                                   -Currently on a regimen of ibuprofen 800 mg daily but also utilizing meloxicam 7.5 mg as needed few times per week.  Also on diclofenac gel.                                   -Medications: Carvedilol 25 mg twice daily, alprazolam 0.25 mg 3 times daily, diphenoxylate/atropine as needed, hyoscyamine 0.125 mg as needed, potassium citrate 1620 mg twice daily, meloxicam as needed, ibuprofen 800 mg daily, phentermine 37.5 mg, metformin 500 mg twice daily, mirtazapine 7.5 mg, magnesium 500 mg, probiotic, vitamin D 450 units, B12 1000 mg, D3, zinc 50 mg, Krill oil, iron.  ORV 3/20/2024:     -Presents for routine GI follow-up regarding history of GERD, IBS and chronic diarrhea.  Overall, improved from GI standpoint at current time.  Bowel movements improved and she indicates daily soft bowel movements varying from Bartholomew 5 or Bartholomew 6.  Denies any rectal bleeding.  No current complaint of heartburn, dysphagia nausea, vomiting or abdominal pain.  Denies rectal bleeding.  Not currently requiring Lomotil for control of previously reported diarrhea.  Has not required hyoscyamine for abdominal cramping recently.  Is experiencing frequent belching.                                -Medications: Carvedilol 25 mg twice daily, alprazolam 0.25 mg 3 times daily, Lomotil as needed, hyoscyamine 0.125 mg as needed, potassium citrate, meloxicam, ibuprofen, probiotic, omeprazole 40 mg.

## 2024-04-08 LAB
APPEARANCE: CLEAR
BACTERIA UR CULT: NORMAL
BACTERIA: NEGATIVE /HPF
BILIRUBIN URINE: NEGATIVE
BLOOD URINE: ABNORMAL
CALCIUM OXALATE CRYSTALS: PRESENT
CAST: 0 /LPF
COLOR: YELLOW
EPITHELIAL CELLS: 1 /HPF
GLUCOSE QUALITATIVE U: NEGATIVE MG/DL
KETONES URINE: NEGATIVE MG/DL
LEUKOCYTE ESTERASE URINE: NEGATIVE
MICROSCOPIC-UA: NORMAL
NITRITE URINE: NEGATIVE
PH URINE: 6
PROTEIN URINE: NEGATIVE MG/DL
RED BLOOD CELLS URINE: 3 /HPF
REVIEW: NORMAL
SPECIFIC GRAVITY URINE: 1.02
UROBILINOGEN URINE: 0.2 MG/DL
WHITE BLOOD CELLS URINE: 1 /HPF

## 2024-04-09 ENCOUNTER — RX RENEWAL (OUTPATIENT)
Age: 69
End: 2024-04-09

## 2024-04-09 ENCOUNTER — APPOINTMENT (OUTPATIENT)
Dept: UROLOGY | Facility: CLINIC | Age: 69
End: 2024-04-09

## 2024-04-09 ENCOUNTER — NON-APPOINTMENT (OUTPATIENT)
Age: 69
End: 2024-04-09

## 2024-04-09 RX ORDER — OMEPRAZOLE 40 MG/1
40 CAPSULE, DELAYED RELEASE ORAL
Qty: 90 | Refills: 1 | Status: ACTIVE | COMMUNITY
Start: 2023-09-20 | End: 1900-01-01

## 2024-04-24 DIAGNOSIS — R11.2 NAUSEA WITH VOMITING, UNSPECIFIED: ICD-10-CM

## 2024-04-24 DIAGNOSIS — K58.0 IRRITABLE BOWEL SYNDROME WITH DIARRHEA: ICD-10-CM

## 2024-04-24 DIAGNOSIS — A09 INFECTIOUS GASTROENTERITIS AND COLITIS, UNSPECIFIED: ICD-10-CM

## 2024-04-27 LAB
ALBUMIN SERPL ELPH-MCNC: 4.4 G/DL
ALP BLD-CCNC: 94 U/L
ALT SERPL-CCNC: 18 U/L
ANION GAP SERPL CALC-SCNC: 13 MMOL/L
AST SERPL-CCNC: 22 U/L
BILIRUB SERPL-MCNC: 0.5 MG/DL
BUN SERPL-MCNC: 9 MG/DL
CALCIUM SERPL-MCNC: 9.8 MG/DL
CHLORIDE SERPL-SCNC: 101 MMOL/L
CO2 SERPL-SCNC: 26 MMOL/L
CREAT SERPL-MCNC: 0.85 MG/DL
CRP SERPL-MCNC: 19 MG/L
EGFR: 74 ML/MIN/1.73M2
GLUCOSE SERPL-MCNC: 89 MG/DL
HCT VFR BLD CALC: 44.2 %
HGB BLD-MCNC: 14.1 G/DL
IGA SER QL IEP: 228 MG/DL
MCHC RBC-ENTMCNC: 28.1 PG
MCHC RBC-ENTMCNC: 31.9 GM/DL
MCV RBC AUTO: 88 FL
PLATELET # BLD AUTO: 292 K/UL
POTASSIUM SERPL-SCNC: 4.2 MMOL/L
PROT SERPL-MCNC: 7.4 G/DL
RBC # BLD: 5.02 M/UL
RBC # FLD: 13.7 %
SODIUM SERPL-SCNC: 140 MMOL/L
TSH SERPL-ACNC: 2.03 UIU/ML
WBC # FLD AUTO: 10.01 K/UL

## 2024-04-28 LAB
CDIFF BY PCR: NOT DETECTED
GI PCR PANEL: NOT DETECTED
TTG IGA SER IA-ACNC: <1.2 U/ML
TTG IGA SER-ACNC: NEGATIVE

## 2024-04-29 LAB — BACTERIA STL CULT: NORMAL

## 2024-05-02 DIAGNOSIS — R10.84 GENERALIZED ABDOMINAL PAIN: ICD-10-CM

## 2024-05-02 LAB — CALPROTECTIN FECAL: 39 UG/G

## 2024-05-06 ENCOUNTER — RESULT REVIEW (OUTPATIENT)
Age: 69
End: 2024-05-06

## 2024-05-09 ENCOUNTER — OUTPATIENT (OUTPATIENT)
Dept: OUTPATIENT SERVICES | Facility: HOSPITAL | Age: 69
LOS: 1 days | End: 2024-05-09
Payer: COMMERCIAL

## 2024-05-09 ENCOUNTER — APPOINTMENT (OUTPATIENT)
Dept: CT IMAGING | Facility: CLINIC | Age: 69
End: 2024-05-09
Payer: MEDICARE

## 2024-05-09 DIAGNOSIS — Z96.642 PRESENCE OF LEFT ARTIFICIAL HIP JOINT: Chronic | ICD-10-CM

## 2024-05-09 DIAGNOSIS — R10.84 GENERALIZED ABDOMINAL PAIN: ICD-10-CM

## 2024-05-09 DIAGNOSIS — Z96.641 PRESENCE OF RIGHT ARTIFICIAL HIP JOINT: Chronic | ICD-10-CM

## 2024-05-09 DIAGNOSIS — N32.81 OVERACTIVE BLADDER: Chronic | ICD-10-CM

## 2024-05-09 DIAGNOSIS — Z98.49 CATARACT EXTRACTION STATUS, UNSPECIFIED EYE: Chronic | ICD-10-CM

## 2024-05-09 DIAGNOSIS — Z98.89 OTHER SPECIFIED POSTPROCEDURAL STATES: Chronic | ICD-10-CM

## 2024-05-09 DIAGNOSIS — Z98.890 OTHER SPECIFIED POSTPROCEDURAL STATES: Chronic | ICD-10-CM

## 2024-05-09 DIAGNOSIS — Z90.89 ACQUIRED ABSENCE OF OTHER ORGANS: Chronic | ICD-10-CM

## 2024-05-09 DIAGNOSIS — Z87.442 PERSONAL HISTORY OF URINARY CALCULI: Chronic | ICD-10-CM

## 2024-05-09 DIAGNOSIS — Z90.710 ACQUIRED ABSENCE OF BOTH CERVIX AND UTERUS: Chronic | ICD-10-CM

## 2024-05-09 PROCEDURE — 74177 CT ABD & PELVIS W/CONTRAST: CPT | Mod: 26

## 2024-05-09 PROCEDURE — 74177 CT ABD & PELVIS W/CONTRAST: CPT

## 2024-06-05 NOTE — ED ADULT NURSE NOTE - PRIVACY CONCERNS
OTOLARYNGOLOGY New Patient Report    Chief Complaint   Patient presents with    New Patient    Hearing Loss        History of Present Illness:   Madai Tucker is a 79 year old female seen for initial visitation.    She is accompanied by her daughter at today's visit.     Kamaljit reports a history of hearing loss. She has noticed worsening of the right ear in particular, at least over the last several months. Denies history of noise exposure, room-spinning dizziness, otalgia, otorrhea. No prior otologic surgeries or recurrent ear infections. She was recently seen by Audiology and noted to have narrow ear canals as well as atypical tympanograms bilaterally. Pure tone thresholds were not tested due to cerumen and abnormal tympanograms. Patient reports some imbalance for several years; previously underwent PT for this which helped significantly and it was felt this was due to problems with her muscles.      There are no other aggravating or alleviating factors with regards to her problem.    Smoking: Denies     The past medical history, family history, social history, medications, allergies and review of systems below have been reviewed.     Past Medical History:   Diagnosis Date    Allergic Rhinitis 08-19-10    tree,grass,dust mite,cockroach,cat,dog    Chronic idiopathic urticaria 9/16/2016    Compression fracture of L1 lumbar vertebra  (CMD) 2015    Generalized osteoarthrosis, involving hand 2/15/2010    MCI (mild cognitive impairment) 11/10/2020    Osteoporosis 08/19/2015    on reclast 2016-17    Primary localized osteoarthrosis, hand 2002       Current Outpatient Medications   Medication Sig Dispense Refill    buPROPion XL (WELLBUTRIN XL) 150 MG 24 hr tablet Take 300 mg daily for 2 days then 150 mg on the third day and repeat cycle. 90 tablet 3    buPROPion XL (WELLBUTRIN XL) 300 MG 24 hr tablet TAKE 300 MG DAILY FOR 2 DAYS THEN 150MG ON THE THIRD DAY AND REPEAT CYCLE. 90 tablet 3    donepezil (ARICEPT) 10 MG tablet  TAKE 1 TABLET BY MOUTH EVERY DAY AT NIGHT 90 tablet 1    Cyanocobalamin (B-12) 1000 MCG SL Tab Place 1,000 mcg under the tongue daily. 30 tablet 0    betamethasone dipropionate augmented (DIPROLENE AF) 0.05 % cream Apply topically 2 times daily. 30 g 1    carbamide peroxide (DEBROX) 6.5 % otic solution Place 5 drops into both ears twice monthly. 15 mL 3    triamcinolone (ARISTOCORT) 0.1 % cream Apply topically as needed.       Ascorbic Acid (vitamin C) 100 MG tablet Take 100 mg by mouth daily.      BIOTIN PO       cholecalciferol (VITAMIN D3) 1000 UNITS tablet Take 1,000 Units by mouth daily.      CALCIUM CITRATE-VITAMIN D PO Take 1 tablet by mouth daily. Unsure of dose.      Lutein 6 MG CAPS Take 1 capsule by mouth 2 times daily. 30 capsule      No current facility-administered medications for this visit.       ALLERGIES:   Allergen Reactions    Cat Dander Other (See Comments)     sneezing and nasal congestion    Dust Other (See Comments)     sneezing and nasal congestion    Penicillins RASH    Eggs Or Egg-Derived Products   (Food Or Med) Nausea & Vomiting    Eggs [Egg] GI UPSET       Social History     Socioeconomic History    Marital status:      Spouse name: Not on file    Number of children: 2    Years of education: Not on file    Highest education level: Not on file   Occupational History    Occupation: Theater promoter     Comment: Stopped ,  No fumes etc.   Tobacco Use    Smoking status: Former     Current packs/day: 0.00     Average packs/day: 0.5 packs/day for 15.0 years (7.5 ttl pk-yrs)     Types: Cigarettes     Start date: 1961     Quit date: 1976     Years since quittin.4    Smokeless tobacco: Never   Vaping Use    Vaping status: never used   Substance and Sexual Activity    Alcohol use: Not Currently     Alcohol/week: 0.0 standard drinks of alcohol     Comment: none since on Wellbutrin     Drug use: No    Sexual activity: Not Currently     Partners: Male   Other Topics Concern      Service No    Blood Transfusions No    Caffeine Concern No     Comment: 1-2 coffee/d.    Occupational Exposure Not Asked    Hobby Hazards Not Asked    Sleep Concern Not Asked    Stress Concern No    Weight Concern No    Special Diet Yes     Comment: lactose intolerant - can do some dairy    Back Care Not Asked    Exercise Yes     Comment: walks mostly every day     Bike Helmet Not Asked    Seat Belt Yes    Self-Exams Not Asked   Social History Narrative    Mar, 2002, w husb and pet dog, many yrs, no sx, no unusual hobbies.        :        2 grown daughters.  Erna lives close.    3 grandsons    Walks 2-3 miles most days      Social Determinants of Health     Financial Resource Strain: Not on file   Food Insecurity: Not on file   Transportation Needs: Not on file   Physical Activity: Sufficiently Active (12/16/2020)    Exercise Vital Sign     Days of Exercise per Week: 7 days     Minutes of Exercise per Session: 40 min   Stress: Low Risk  (3/30/2021)    Stress     How Stressed: Not on file   Social Connections: Not on file   Interpersonal Safety: Not on file         Review of Systems:  See above for documented ROS.    There are no other Constitutional, Respiratory or Gastrointestinal symptoms except as noted above.    Nursing notes reviewed and I agree.  PCP notes also reviewed.      Physical Exam:     Visit Vitals  /63 (BP Location: LUE - Left upper extremity, Patient Position: Sitting, Cuff Size: Regular)   Pulse 62   Ht 5' 3.5\" (1.613 m)   Wt 47.6 kg (105 lb)   LMP 08/08/2001   BMI 18.31 kg/m²       General:   Patient is a pleasant well developed female in no distress.    Patient is able to communicate without any problems.    Voice is normal.  Affect is normal and patient is alert.    Procedure:  Removal of Impacted Cerumen    With the patient's permission, the cerumen impaction is carefully removed from the patient's bilateral ears using a combination of instruments (otoscope and  curettes).  There is a tiny amount of old blood admixed with cerumen on the left. This takes approximately 5 minutes of moderate effort.     Patient tolerated the procedure well and there were no complications.    After removal:    Ears:   Right: Auricle is normal.   External auditory canal is clear.  EAC mildly narrow. No significant erythema, edema or exudate.   Tympanic membrane is normal in appearance and mobility.  Left: Auricle is normal.   External auditory canal is clear. EAC mildly narrow. No significant erythema, edema or exudate.   Tympanic membrane is normal in appearance and mobility.    Nose:   There is no significant external nasal deformity.  Intranasal exam reveals that mucosa is normal.  Nasal septum is normal and relatively midline.  Inferior turbinates appear normal.  No significant discharge or other lesions seen.    Oral Cavity/Oropharynx:  Lips:  Normal without lesions.  Teeth:  Normal without significant lesions.  Gums:  No lesions seen.  Tongue: Normal without lesions.  Tongue protrudes midline.  Oral Cavity: Mucosa is normal without lesions.  Pharynx: No abnormalities seen.    Tonsils are unremarkable    Patient has a gag reflex and palate elevates symmetrically.    Neck:   Neck is supple.  No significant masses.  No significant lymphadenopathy.  Trachea is midline.  Thyroid gland is not significantly enlarged and I do not palpate any nodules.  No identified major salivary gland issues.      Cranial Nerves:   VII and XII appear to be grossly intact.    Skin:   No obvious skin lesions involving the head or neck.    Assessment/Plan:  Hearing loss, bilateral. Subjectively worse on the right. Recommend formal audiogram; she is already scheduled for that.   Abnormal tympanograms. Patient is reassured that her ear examination today is completely normal. Will repeat this testing later today with her audio as scheduled.   Cerumen impaction, bilateral, removed in office today.        No

## 2024-06-24 NOTE — PATIENT PROFILE ADULT - NSPROSPIRITUALVALUESFT_GEN_A_NUR
PATIENT NAME:  Itzel Abdalla    SURGEON:  Teodoro Cade MD    DATE OF SURGERY:  6/24/2024    LOCATION: [facility]    PREOPERATIVE DIAGNOSIS:   right carpal tunnel syndrome    POSTOPERATIVE DIAGNOSIS:  Same    PROCEDURE:  right Open carpal tunnel decompression             ANESTHESIA:  Local (1% lidocaine with 0.25% bupivicaine in a 1:1 mixture) with sedation     BLOOD LOSS:  Minimal    Assistant: Erin Chau PA-C     OPERATIVE INDICATIONS: The patient is a 70 y.o. old female who has developed progressive right carpal tunnel syndrome, unresponsive to all conservative treatment. Symptoms have failed to respond consistently to conservative treatment such that patient has elected to undergo carpal tunnel decompression. She understands the alternatives to surgery, the nature of this elective procedure, the usual recovery, possible variations in healing, and the potential for shortcomings and complications (including but not exclusive to bleeding, infection, scar tenderness,  weakness, residual numbness, or thenar muscle weakness).         DESCRIPTION  OF PROCEDURE: Patient identified correctly in the pre-operative holding area and correct extremity marked. Was then taken stable to the operating room and placed supine with the operative extremity on a hand table. After sedation was administered by the anesthesia team, the right hand and forearm were prepped and draped in a sterile field. A timeout was taken and the operative site was confirmed. Local anesthesia was instilled into the wound.   The extremity was then elevated and exsanguinated and a forearm tourniquet was inflated to 200 mm of mercury. An incision was made in the proximal palm in line with the radial side of the ring finger from the distal wrist crease to Kaplans line.  Dissection was carried through the subcutaneous tissue and the transverse carpal ligament was visualized.  This was released under direct visualization first distally followed  none

## 2024-07-16 ENCOUNTER — APPOINTMENT (OUTPATIENT)
Dept: UROLOGY | Facility: CLINIC | Age: 69
End: 2024-07-16
Payer: MEDICARE

## 2024-07-16 ENCOUNTER — OUTPATIENT (OUTPATIENT)
Dept: OUTPATIENT SERVICES | Facility: HOSPITAL | Age: 69
LOS: 1 days | End: 2024-07-16

## 2024-07-16 VITALS — SYSTOLIC BLOOD PRESSURE: 102 MMHG | HEART RATE: 78 BPM | DIASTOLIC BLOOD PRESSURE: 66 MMHG

## 2024-07-16 DIAGNOSIS — Z98.890 OTHER SPECIFIED POSTPROCEDURAL STATES: Chronic | ICD-10-CM

## 2024-07-16 DIAGNOSIS — Z96.641 PRESENCE OF RIGHT ARTIFICIAL HIP JOINT: Chronic | ICD-10-CM

## 2024-07-16 DIAGNOSIS — N32.81 OVERACTIVE BLADDER: Chronic | ICD-10-CM

## 2024-07-16 DIAGNOSIS — Z90.710 ACQUIRED ABSENCE OF BOTH CERVIX AND UTERUS: Chronic | ICD-10-CM

## 2024-07-16 DIAGNOSIS — Z98.89 OTHER SPECIFIED POSTPROCEDURAL STATES: Chronic | ICD-10-CM

## 2024-07-16 DIAGNOSIS — Z98.49 CATARACT EXTRACTION STATUS, UNSPECIFIED EYE: Chronic | ICD-10-CM

## 2024-07-16 DIAGNOSIS — N20.0 CALCULUS OF KIDNEY: ICD-10-CM

## 2024-07-16 DIAGNOSIS — Z96.642 PRESENCE OF LEFT ARTIFICIAL HIP JOINT: Chronic | ICD-10-CM

## 2024-07-16 DIAGNOSIS — Z90.89 ACQUIRED ABSENCE OF OTHER ORGANS: Chronic | ICD-10-CM

## 2024-07-16 DIAGNOSIS — Z87.442 PERSONAL HISTORY OF URINARY CALCULI: Chronic | ICD-10-CM

## 2024-07-16 PROCEDURE — 99213 OFFICE O/P EST LOW 20 MIN: CPT

## 2024-07-17 DIAGNOSIS — R35.0 FREQUENCY OF MICTURITION: ICD-10-CM

## 2024-07-24 ENCOUNTER — APPOINTMENT (OUTPATIENT)
Dept: ULTRASOUND IMAGING | Facility: CLINIC | Age: 69
End: 2024-07-24
Payer: MEDICARE

## 2024-07-24 PROCEDURE — 76770 US EXAM ABDO BACK WALL COMP: CPT | Mod: 26

## 2024-07-27 ENCOUNTER — TRANSCRIPTION ENCOUNTER (OUTPATIENT)
Age: 69
End: 2024-07-27

## 2024-08-28 NOTE — ED ADULT NURSE NOTE - DOES PATIENT HAVE ADVANCE DIRECTIVE
Employee $0 Diabetes Meds/Supplies Program ([facility]) visit completed. Enrollment is expected to be active in 2 weeks (9/11/24).  Per your note, I see plan was to initiate Mounjaro 2.5 mg once weekly and Metformin  mg once daily; however, I only see a prescription for Mounjaro.  - Please confirm plan is to start both medications at these doses. - Medications must be sent to  Pharmacy. Once confirmed, I'd be happy to send these medications and testing supplies to the patient's preferred pharmacy.  Please review and let me know if there is anything else I can do to assist. If approved, I'll addend my note to reflect any changes. Thank you!
Yes

## 2024-09-25 ENCOUNTER — APPOINTMENT (OUTPATIENT)
Dept: GASTROENTEROLOGY | Facility: CLINIC | Age: 69
End: 2024-09-25

## 2024-10-04 RX ORDER — DIPHENOXYLATE HYDROCHLORIDE AND ATROPINE SULFATE 2.5; .025 MG/1; MG/1
2.5-0.025 TABLET ORAL 4 TIMES DAILY
Qty: 240 | Refills: 0 | Status: ACTIVE | COMMUNITY
Start: 2024-10-04 | End: 1900-01-01

## 2024-10-04 RX ORDER — HYOSCYAMINE SULFATE 0.12 MG/1
0.12 TABLET SUBLINGUAL 4 TIMES DAILY
Qty: 120 | Refills: 1 | Status: ACTIVE | COMMUNITY
Start: 2024-10-04 | End: 1900-01-01

## 2024-10-08 RX ORDER — DICYCLOMINE HYDROCHLORIDE 10 MG/1
10 CAPSULE ORAL
Qty: 100 | Refills: 1 | Status: ACTIVE | COMMUNITY
Start: 2024-10-08 | End: 1900-01-01

## 2024-10-18 ENCOUNTER — APPOINTMENT (OUTPATIENT)
Dept: GASTROENTEROLOGY | Facility: CLINIC | Age: 69
End: 2024-10-18
Payer: MEDICARE

## 2024-10-18 VITALS
SYSTOLIC BLOOD PRESSURE: 134 MMHG | HEIGHT: 62 IN | BODY MASS INDEX: 52.26 KG/M2 | HEART RATE: 100 BPM | OXYGEN SATURATION: 99 % | WEIGHT: 284 LBS | DIASTOLIC BLOOD PRESSURE: 64 MMHG

## 2024-10-18 DIAGNOSIS — R10.13 EPIGASTRIC PAIN: ICD-10-CM

## 2024-10-18 DIAGNOSIS — R10.84 GENERALIZED ABDOMINAL PAIN: ICD-10-CM

## 2024-10-18 DIAGNOSIS — K58.0 IRRITABLE BOWEL SYNDROME WITH DIARRHEA: ICD-10-CM

## 2024-10-18 PROCEDURE — 99214 OFFICE O/P EST MOD 30 MIN: CPT

## 2024-10-18 RX ORDER — LOPERAMIDE HYDROCHLORIDE 2 MG/1
2 CAPSULE ORAL
Qty: 240 | Refills: 1 | Status: ACTIVE | COMMUNITY
Start: 2024-10-18 | End: 1900-01-01

## 2024-11-07 ENCOUNTER — NON-APPOINTMENT (OUTPATIENT)
Age: 69
End: 2024-11-07

## 2024-11-07 ENCOUNTER — APPOINTMENT (OUTPATIENT)
Dept: UROLOGY | Facility: CLINIC | Age: 69
End: 2024-11-07
Payer: MEDICARE

## 2024-11-07 DIAGNOSIS — N32.81 OVERACTIVE BLADDER: ICD-10-CM

## 2024-11-07 DIAGNOSIS — N39.41 URGE INCONTINENCE: ICD-10-CM

## 2024-11-07 DIAGNOSIS — Z96.82 PRESENCE OF NEUROSTIMULATOR: ICD-10-CM

## 2024-11-07 PROCEDURE — 99213 OFFICE O/P EST LOW 20 MIN: CPT

## 2024-11-07 PROCEDURE — 95970 ALYS NPGT W/O PRGRMG: CPT

## 2024-11-07 PROCEDURE — G2211 COMPLEX E/M VISIT ADD ON: CPT

## 2024-11-27 NOTE — ED ADULT NURSE NOTE - MODE OF DISCHARGE
Continue Regimen: Clobetasol apply to affected areas twice a day for 2 weeks on, one week off. Detail Level: Zone Plan: Follow up in 1 month for eczema Defer Treatment (Provide Reason For Deferment In Text Field Below): Dupixent in reserve Render In Strict Bullet Format?: No Initiate Treatment: tacrolimus 0.1 % topical ointment \\nApply to affected areas of the body twice daily. Ambulatory

## 2025-01-07 ENCOUNTER — APPOINTMENT (OUTPATIENT)
Dept: UROLOGY | Facility: CLINIC | Age: 70
End: 2025-01-07

## 2025-01-14 NOTE — H&P PST ADULT - VENOUS THROMBOEMBOLISM CURRENT LABS/TEST RESULTS
Recovery period without difficulty. Pt alert and oriented and denies pain. Dressing is clean, dry, and intact. Reviewed discharge instructions with patient and spouse, both verbalized understanding.   
none

## 2025-01-21 ENCOUNTER — APPOINTMENT (OUTPATIENT)
Dept: UROLOGY | Facility: HOSPITAL | Age: 70
End: 2025-01-21

## 2025-02-05 RX ORDER — DICYCLOMINE HYDROCHLORIDE 10 MG/1
10 CAPSULE ORAL
Qty: 100 | Refills: 0 | Status: ACTIVE | COMMUNITY
Start: 2025-02-05 | End: 1900-01-01

## 2025-02-06 NOTE — H&P PST ADULT - PRO PAIN LIFE ADAPT
Excellent. These labs are improved.stay off atorvastatin - add it to allergy list decreased activity level

## 2025-04-21 ENCOUNTER — NON-APPOINTMENT (OUTPATIENT)
Age: 70
End: 2025-04-21

## 2025-04-21 ENCOUNTER — APPOINTMENT (OUTPATIENT)
Dept: GASTROENTEROLOGY | Facility: CLINIC | Age: 70
End: 2025-04-21

## 2025-04-30 NOTE — ASU DISCHARGE PLAN (ADULT/PEDIATRIC). - ITEMS TO FOLLOWUP WITH YOUR PHYSICIAN'S
Type of Study: HOME SLEEP STUDY - NOMAD     The patient received equipment and instructions for use of the [employer]. Nomad HSAT device. The patient was instructed how to apply the effort belts, cannula, thermistor. It was also explained how the Nomad and oximeter components work.  The patient was asked to record their sleep for at least a 6 hour period.     The patient was informed of their responsibility for the device and acknowledged this by signing the HSAT device contract. The patient was asked to return the device on 5/1/2025 between the hours of 8am to 3pm to the [facility] in [facility].    Patient told tech she use to use Bilevel with supplemental oxygen bled in but discontinued due to interface issues.  Currently sleeps with 6 pillows and coughs at night still with the head elevated.      The patient was instructed to call 911 as usual for any medical- emergencies while at home.  The patient was also given a phone number for troubleshooting when using the device in case there were additional questions.   Follow up in the office with Dr. Aguilera.  Call for an appointment Follow up in the office with Dr. Aguilera in 2 weeks.  Call for an appointment

## 2025-06-16 ENCOUNTER — NON-APPOINTMENT (OUTPATIENT)
Age: 70
End: 2025-06-16

## 2025-06-16 ENCOUNTER — RX RENEWAL (OUTPATIENT)
Age: 70
End: 2025-06-16

## 2025-07-16 ENCOUNTER — RX RENEWAL (OUTPATIENT)
Age: 70
End: 2025-07-16

## 2025-07-18 ENCOUNTER — APPOINTMENT (OUTPATIENT)
Dept: ORTHOPEDIC SURGERY | Facility: CLINIC | Age: 70
End: 2025-07-18
Payer: MEDICARE

## 2025-07-18 VITALS — BODY MASS INDEX: 52.26 KG/M2 | HEIGHT: 62 IN | WEIGHT: 284 LBS

## 2025-07-18 PROBLEM — M54.2 CERVICALGIA: Status: ACTIVE | Noted: 2025-07-18

## 2025-07-18 PROCEDURE — 99204 OFFICE O/P NEW MOD 45 MIN: CPT

## 2025-07-18 PROCEDURE — 72050 X-RAY EXAM NECK SPINE 4/5VWS: CPT

## 2025-07-31 ENCOUNTER — APPOINTMENT (OUTPATIENT)
Dept: UROLOGY | Facility: CLINIC | Age: 70
End: 2025-07-31
Payer: MEDICARE

## 2025-07-31 VITALS — SYSTOLIC BLOOD PRESSURE: 146 MMHG | DIASTOLIC BLOOD PRESSURE: 80 MMHG | HEART RATE: 75 BPM

## 2025-07-31 DIAGNOSIS — R82.992 HYPEROXALURIA: ICD-10-CM

## 2025-07-31 DIAGNOSIS — N32.81 OVERACTIVE BLADDER: ICD-10-CM

## 2025-07-31 DIAGNOSIS — N20.0 CALCULUS OF KIDNEY: ICD-10-CM

## 2025-07-31 PROCEDURE — 99214 OFFICE O/P EST MOD 30 MIN: CPT

## 2025-07-31 PROCEDURE — G2211 COMPLEX E/M VISIT ADD ON: CPT

## 2025-08-01 DIAGNOSIS — R82.993 HYPERURICOSURIA: ICD-10-CM

## 2025-08-01 LAB — 25(OH)D3 SERPL-MCNC: 27.6 NG/ML

## 2025-08-05 LAB — URATE SERPL-MCNC: 8.4 MG/DL

## 2025-08-06 RX ORDER — ALLOPURINOL 100 MG/1
100 TABLET ORAL
Qty: 90 | Refills: 3 | Status: ACTIVE | COMMUNITY
Start: 2025-08-01 | End: 1900-01-01

## 2025-08-21 ENCOUNTER — OUTPATIENT (OUTPATIENT)
Dept: OUTPATIENT SERVICES | Facility: HOSPITAL | Age: 70
LOS: 1 days | End: 2025-08-21
Payer: COMMERCIAL

## 2025-08-21 ENCOUNTER — APPOINTMENT (OUTPATIENT)
Dept: ULTRASOUND IMAGING | Facility: IMAGING CENTER | Age: 70
End: 2025-08-21
Payer: MEDICARE

## 2025-08-21 DIAGNOSIS — Z90.89 ACQUIRED ABSENCE OF OTHER ORGANS: Chronic | ICD-10-CM

## 2025-08-21 DIAGNOSIS — Z96.642 PRESENCE OF LEFT ARTIFICIAL HIP JOINT: Chronic | ICD-10-CM

## 2025-08-21 DIAGNOSIS — Z98.890 OTHER SPECIFIED POSTPROCEDURAL STATES: Chronic | ICD-10-CM

## 2025-08-21 DIAGNOSIS — Z98.89 OTHER SPECIFIED POSTPROCEDURAL STATES: Chronic | ICD-10-CM

## 2025-08-21 DIAGNOSIS — Z98.49 CATARACT EXTRACTION STATUS, UNSPECIFIED EYE: Chronic | ICD-10-CM

## 2025-08-21 DIAGNOSIS — N32.81 OVERACTIVE BLADDER: Chronic | ICD-10-CM

## 2025-08-21 DIAGNOSIS — Z90.710 ACQUIRED ABSENCE OF BOTH CERVIX AND UTERUS: Chronic | ICD-10-CM

## 2025-08-21 DIAGNOSIS — N20.0 CALCULUS OF KIDNEY: ICD-10-CM

## 2025-08-21 DIAGNOSIS — Z96.641 PRESENCE OF RIGHT ARTIFICIAL HIP JOINT: Chronic | ICD-10-CM

## 2025-08-21 DIAGNOSIS — Z87.442 PERSONAL HISTORY OF URINARY CALCULI: Chronic | ICD-10-CM

## 2025-08-21 PROCEDURE — 76770 US EXAM ABDO BACK WALL COMP: CPT

## 2025-08-21 PROCEDURE — 76770 US EXAM ABDO BACK WALL COMP: CPT | Mod: 26

## 2025-08-29 ENCOUNTER — APPOINTMENT (OUTPATIENT)
Dept: ORTHOPEDIC SURGERY | Facility: CLINIC | Age: 70
End: 2025-08-29

## (undated) DEVICE — IRR BULB PATHFINDER + 10"

## (undated) DEVICE — VENODYNE/SCD SLEEVE CALF LARGE

## (undated) DEVICE — SOL IRR POUR H2O 1500ML

## (undated) DEVICE — GLV 8 PROTEXIS (WHITE)

## (undated) DEVICE — GLV 6.5 PROTEXIS (WHITE)

## (undated) DEVICE — SYR ASEPTO

## (undated) DEVICE — SOL IRR BAG NS 0.9% 3000ML

## (undated) DEVICE — GLV 7.5 PROTEXIS (WHITE)

## (undated) DEVICE — POSITIONER FOAM HEADREST (PINK)

## (undated) DEVICE — FOLEY HOLDER STATLOCK 2 WAY ADULT

## (undated) DEVICE — DRAPE EQUIPMENT BANDED BAG 30 X 30" (SHOWER CAP)

## (undated) DEVICE — WARMING BLANKET UPPER ADULT

## (undated) DEVICE — ACMI SELF-SEALING SEAL UP TO 7FR

## (undated) DEVICE — FOLEY TRAY 16FR 5CC LTX UMETER CLOSED

## (undated) DEVICE — POSITIONER FOAM EGG CRATE ULNAR 2PCS (PINK)

## (undated) DEVICE — TUBING RANGER FLUID IRRIGATION SET DISP

## (undated) DEVICE — GLV 7 PROTEXIS (WHITE)

## (undated) DEVICE — PACK CYSTO

## (undated) DEVICE — SOL IRR BAG H2O 3000ML

## (undated) DEVICE — GOWN TRIMAX LG